# Patient Record
Sex: FEMALE | Race: WHITE | URBAN - METROPOLITAN AREA
[De-identification: names, ages, dates, MRNs, and addresses within clinical notes are randomized per-mention and may not be internally consistent; named-entity substitution may affect disease eponyms.]

---

## 2017-05-17 ENCOUNTER — HOSPITAL ENCOUNTER (INPATIENT)
Facility: CLINIC | Age: 32
LOS: 4 days | Discharge: HOME | DRG: 885 | End: 2017-05-21
Attending: PSYCHIATRY & NEUROLOGY | Admitting: PSYCHIATRY & NEUROLOGY
Payer: COMMERCIAL

## 2017-05-17 ENCOUNTER — TRANSFERRED RECORDS (OUTPATIENT)
Dept: HEALTH INFORMATION MANAGEMENT | Facility: CLINIC | Age: 32
End: 2017-05-17

## 2017-05-17 DIAGNOSIS — K59.01 SLOW TRANSIT CONSTIPATION: ICD-10-CM

## 2017-05-17 DIAGNOSIS — J45.901 ASTHMA EXACERBATION: ICD-10-CM

## 2017-05-17 DIAGNOSIS — F33.2 SEVERE RECURRENT MAJOR DEPRESSION WITHOUT PSYCHOTIC FEATURES (H): ICD-10-CM

## 2017-05-17 DIAGNOSIS — F41.9 ANXIETY: Primary | ICD-10-CM

## 2017-05-17 DIAGNOSIS — K21.9 GASTROESOPHAGEAL REFLUX DISEASE WITHOUT ESOPHAGITIS: ICD-10-CM

## 2017-05-17 PROCEDURE — 12400001 ZZH R&B MH UMMC

## 2017-05-17 PROCEDURE — 25000132 ZZH RX MED GY IP 250 OP 250 PS 637: Performed by: NURSE PRACTITIONER

## 2017-05-17 RX ORDER — FLUTICASONE PROPIONATE 50 MCG
2 SPRAY, SUSPENSION (ML) NASAL DAILY
Status: DISCONTINUED | OUTPATIENT
Start: 2017-05-17 | End: 2017-05-21 | Stop reason: HOSPADM

## 2017-05-17 RX ORDER — OLANZAPINE 10 MG/1
10 TABLET ORAL
Status: DISCONTINUED | OUTPATIENT
Start: 2017-05-17 | End: 2017-05-21 | Stop reason: HOSPADM

## 2017-05-17 RX ORDER — HYDROXYZINE HYDROCHLORIDE 25 MG/1
25-50 TABLET, FILM COATED ORAL EVERY 4 HOURS PRN
Status: DISCONTINUED | OUTPATIENT
Start: 2017-05-17 | End: 2017-05-21 | Stop reason: HOSPADM

## 2017-05-17 RX ORDER — OLANZAPINE 10 MG/2ML
10 INJECTION, POWDER, FOR SOLUTION INTRAMUSCULAR
Status: DISCONTINUED | OUTPATIENT
Start: 2017-05-17 | End: 2017-05-21 | Stop reason: HOSPADM

## 2017-05-17 RX ORDER — MONTELUKAST SODIUM 10 MG/1
10 TABLET ORAL AT BEDTIME
Status: DISCONTINUED | OUTPATIENT
Start: 2017-05-17 | End: 2017-05-21 | Stop reason: HOSPADM

## 2017-05-17 RX ORDER — ALBUTEROL SULFATE 90 UG/1
2 AEROSOL, METERED RESPIRATORY (INHALATION) EVERY 6 HOURS
Status: DISCONTINUED | OUTPATIENT
Start: 2017-05-17 | End: 2017-05-19

## 2017-05-17 RX ORDER — CLONAZEPAM 0.5 MG/1
0.5 TABLET ORAL 3 TIMES DAILY
Status: DISCONTINUED | OUTPATIENT
Start: 2017-05-17 | End: 2017-05-21 | Stop reason: HOSPADM

## 2017-05-17 RX ORDER — BISACODYL 10 MG
10 SUPPOSITORY, RECTAL RECTAL DAILY PRN
Status: DISCONTINUED | OUTPATIENT
Start: 2017-05-17 | End: 2017-05-21 | Stop reason: HOSPADM

## 2017-05-17 RX ORDER — ACETAMINOPHEN 325 MG/1
650 TABLET ORAL EVERY 4 HOURS PRN
Status: DISCONTINUED | OUTPATIENT
Start: 2017-05-17 | End: 2017-05-19

## 2017-05-17 RX ORDER — ALBUTEROL SULFATE 90 UG/1
2 AEROSOL, METERED RESPIRATORY (INHALATION) EVERY 6 HOURS
Status: ON HOLD | COMMUNITY
End: 2017-05-21

## 2017-05-17 RX ORDER — MONTELUKAST SODIUM 10 MG/1
10 TABLET ORAL AT BEDTIME
COMMUNITY

## 2017-05-17 RX ORDER — TRAZODONE HYDROCHLORIDE 50 MG/1
50 TABLET, FILM COATED ORAL
Status: DISCONTINUED | OUTPATIENT
Start: 2017-05-17 | End: 2017-05-21 | Stop reason: HOSPADM

## 2017-05-17 RX ORDER — ARIPIPRAZOLE 5 MG/1
5 TABLET ORAL DAILY
Status: DISCONTINUED | OUTPATIENT
Start: 2017-05-17 | End: 2017-05-18

## 2017-05-17 RX ORDER — NICOTINE 21 MG/24HR
1 PATCH, TRANSDERMAL 24 HOURS TRANSDERMAL DAILY
Status: DISCONTINUED | OUTPATIENT
Start: 2017-05-17 | End: 2017-05-21 | Stop reason: HOSPADM

## 2017-05-17 RX ORDER — ALUMINA, MAGNESIA, AND SIMETHICONE 2400; 2400; 240 MG/30ML; MG/30ML; MG/30ML
30 SUSPENSION ORAL EVERY 4 HOURS PRN
Status: DISCONTINUED | OUTPATIENT
Start: 2017-05-17 | End: 2017-05-21 | Stop reason: HOSPADM

## 2017-05-17 RX ADMIN — NICOTINE 1 PATCH: 14 PATCH, EXTENDED RELEASE TRANSDERMAL at 18:32

## 2017-05-17 RX ADMIN — TRAZODONE HYDROCHLORIDE 50 MG: 50 TABLET ORAL at 20:38

## 2017-05-17 RX ADMIN — MONTELUKAST SODIUM 10 MG: 10 TABLET, FILM COATED ORAL at 20:37

## 2017-05-17 RX ADMIN — CLONAZEPAM 0.5 MG: 0.5 TABLET ORAL at 20:36

## 2017-05-17 ASSESSMENT — ACTIVITIES OF DAILY LIVING (ADL)
COGNITION: 0 - NO COGNITION ISSUES REPORTED
DRESS: 0-->INDEPENDENT
FALL_HISTORY_WITHIN_LAST_SIX_MONTHS: NO
TOILETING: 0-->INDEPENDENT
RETIRED_EATING: 0-->INDEPENDENT
PRIOR_FUNCTIONAL_LEVEL_COMMENT: INDEPENDENT
TRANSFERRING: 0-->INDEPENDENT
BATHING: 0-->INDEPENDENT
AMBULATION: 0-->INDEPENDENT
SWALLOWING: 0-->SWALLOWS FOODS/LIQUIDS WITHOUT DIFFICULTY
RETIRED_COMMUNICATION: 0-->UNDERSTANDS/COMMUNICATES WITHOUT DIFFICULTY

## 2017-05-17 NOTE — IP AVS SNAPSHOT
MRN:[id number]                      After Visit Summary   5/17/2017    Nuha Lees    MRN: [id number]           Thank you!     Thank you for choosing [facility] for your care. Our goal is always to provide you with excellent care. Hearing back from our patients is one way we can continue to improve our services. Please take a few minutes to complete the written survey that you may receive in the mail after you visit with us. Thank you!        Patient Information     Date Of Birth          1985        Designated Caregiver       Most Recent Value    Caregiver    Will someone help with your care after discharge? yes    Name of designated caregiver Sohan     Phone number of caregiver [phone]    Caregiver address same as pt.       About your hospital stay     You were admitted on:  May 17, 2017 You last received care in the:  [facility]    You were discharged on:  May 21, 2017       Who to Call     For medical emergencies, please call 911.  For non-urgent questions about your medical care, please call your primary care provider or clinic, [phone]  For questions related to your surgery, please call your surgery clinic        Attending Provider     Provider Jayy Motta MD Psychiatry    [place] Cuca Segura MD Anesthesiology    Camilo Ross MD Internal Medicine       Primary Care Provider Office Phone # Fax #    Anabel Lord MD, [phone]       [address] [address]        After Care Instructions     Discharge Instructions       No smoking.  Prednisone daily for 3 days starting 5/22  Nebs every 4 hrs as needed  Suppository if no BM in next 2 days  Appointment this week with primary MD - with psychiatry follow up.  Call MD or go to ER if breathing worse, recurrent chest pain or increase despondency.                  Pending Results     Date and Time Order Name Status Description  "   2017 0557 EKG 12-lead, complete Preliminary     2017 1138 EKG 12-lead, complete Preliminary             Statement of Approval     Ordered          17 0926  I have reviewed and agree with all the recommendations and orders detailed in this document.     Approved and electronically signed by:  Camilo Ross MD             Admission Information     Date & Time Provider Department Dept. Phone    2017 Camilo Ross MD [facility] [phone]      Your Vitals Were     Blood Pressure Pulse Temperature Respirations Height Weight    102/59 (BP Location: Left arm) 52 97.4  F (36.3  C) (Oral) 16 1.65 m (5' 4.96\") 82.6 kg (182 lb)    Pulse Oximetry BMI (Body Mass Index)                97% 30.32 kg/m2          MyChart Information     [facility] lets you send messages to your doctor, view your test results, renew your prescriptions, schedule appointments and more. To sign up, go to [email or web address] . Click on \"Log in\" on the left side of the screen, which will take you to the Welcome page. Then click on \"Sign up Now\" on the right side of the page.     You will be asked to enter the access code listed below, as well as some personal information. Please follow the directions to create your username and password.     Your access code is: [email or web address]  Expires: 2017 11:05 AM     Your access code will  in 90 days. If you need help or a new code, please call your [facility] clinic or [phone].        Care EveryWhere ID     This is your Care EveryWhere ID. This could be used by other organizations to access your [facility] medical records  [id number]           Review of your medicines      START taking        Dose / Directions    fluticasone-vilanterol 200-25 MCG/INH oral inhaler   Commonly known as:  BREO ELLIPTA   Used for:  Asthma exacerbation        Dose:  1 puff   Inhale 1 puff into the lungs daily   Quantity:  1 Inhaler   Refills:  0       ipratropium - albuterol 0.5 " mg/2.5 mg/3 mL 0.5-2.5 (3) MG/3ML neb solution   Commonly known as:  DUONEB        Dose:  3 mL   Take 1 vial (3 mLs) by nebulization every 4 hours as needed for shortness of breath / dyspnea or wheezing   Quantity:  360 mL   Refills:  0       nicotine 14 MG/24HR 24 hr patch   Commonly known as:  NICODERM CQ   Used for:  Asthma exacerbation        Dose:  1 patch   Place 1 patch onto the skin daily   Quantity:  14 patch   Refills:  0       pantoprazole 40 MG EC tablet   Commonly known as:  PROTONIX   Used for:  Gastroesophageal reflux disease without esophagitis        Dose:  40 mg   Take 1 tablet (40 mg) by mouth every morning (before breakfast)   Quantity:  15 tablet   Refills:  0       polyethylene glycol Packet   Commonly known as:  MIRALAX/GLYCOLAX   Used for:  Slow transit constipation        Dose:  17 g   Take 17 g by mouth daily as needed for constipation   Quantity:  7 packet   Refills:  0       predniSONE 20 MG tablet   Commonly known as:  DELTASONE   Used for:  Asthma exacerbation        Dose:  40 mg   Start taking on:  5/22/2017   Take 2 tablets (40 mg) by mouth daily for 3 days   Quantity:  6 tablet   Refills:  0       senna-docusate 8.6-50 MG per tablet   Commonly known as:  SENOKOT-S;PERICOLACE   Used for:  Slow transit constipation        Dose:  2 tablet   Take 2 tablets by mouth 2 times daily as needed (constipation )   Quantity:  20 tablet   Refills:  0       traZODone 50 MG tablet   Commonly known as:  DESYREL        Dose:  50 mg   Take 1 tablet (50 mg) by mouth nightly as needed for sleep   Quantity:  15 tablet   Refills:  0       venlafaxine 150 MG Tb24 24 hr tablet   Commonly known as:  EFFEXOR-ER        Dose:  150 mg   Take 1 tablet (150 mg) by mouth daily (with breakfast)   Quantity:  30 each   Refills:  0         CONTINUE these medicines which may have CHANGED, or have new prescriptions. If we are uncertain of the size of tablets/capsules you have at home, strength may be listed as something  that might have changed.        Dose / Directions    albuterol 108 (90 BASE) MCG/ACT Inhaler   Commonly known as:  PROAIR HFA/PROVENTIL HFA/VENTOLIN HFA   This may have changed:    - when to take this  - reasons to take this        Dose:  2 puff   Inhale 2 puffs into the lungs every 4 hours as needed for shortness of breath / dyspnea or wheezing   Refills:  0       hydrOXYzine 25 MG tablet   Commonly known as:  ATARAX   This may have changed:    - medication strength  - how much to take  - reasons to take this   Used for:  Anxiety        Dose:  25-50 mg   Take 1-2 tablets (25-50 mg) by mouth every 4 hours as needed for anxiety   Quantity:  30 tablet   Refills:  0         CONTINUE these medicines which have NOT CHANGED        Dose / Directions    CLONAZEPAM PO        Dose:  0.5 mg   Take 0.5 mg by mouth 3 times daily   Refills:  0       fluticasone 27.5 MCG/SPRAY spray   Commonly known as:  VERAMYST        Dose:  2 spray   Spray 2 sprays into both nostrils daily   Refills:  0       montelukast 10 MG tablet   Commonly known as:  SINGULAIR        Dose:  10 mg   Take 10 mg by mouth At Bedtime   Refills:  0       VITAMIN D (CHOLECALCIFEROL) PO        Dose:  5000 Units   Take 5,000 Units by mouth daily   Refills:  0         STOP taking     ARIPIPRAZOLE PO           PROZAC PO                Where to get your medicines      These medications were sent to [facility] [place], [place] - [address]  [address] [address]     Phone:  [phone]     fluticasone-vilanterol 200-25 MCG/INH oral inhaler    hydrOXYzine 25 MG tablet    nicotine 14 MG/24HR 24 hr patch    pantoprazole 40 MG EC tablet    polyethylene glycol Packet    predniSONE 20 MG tablet    senna-docusate 8.6-50 MG per tablet    traZODone 50 MG tablet    venlafaxine 150 MG Tb24 24 hr tablet                Protect others around you: Learn how to safely use, store and throw away your medicines at www.disposemymeds.org.              Medication List: This is a list of all your medications and when to take them. Check marks below indicate your daily home schedule. Keep this list as a reference.      Medications           Morning Afternoon Evening Bedtime As Needed    albuterol 108 (90 BASE) MCG/ACT Inhaler   Commonly known as:  PROAIR HFA/PROVENTIL HFA/VENTOLIN HFA   Inhale 2 puffs into the lungs every 4 hours as needed for shortness of breath / dyspnea or wheezing   Last time this was given:  6 puffs on 5/19/2017 10:16 AM                                   CLONAZEPAM PO   Take 0.5 mg by mouth 3 times daily   Last time this was given:  0.5 mg on 5/21/2017  8:37 AM            8AM       2PM       8PM               fluticasone 27.5 MCG/SPRAY spray   Commonly known as:  VERAMYST   Spray 2 sprays into both nostrils daily            8AM                       fluticasone-vilanterol 200-25 MCG/INH oral inhaler   Commonly known as:  BREO ELLIPTA   Inhale 1 puff into the lungs daily   Last time this was given:  1 puff on 5/21/2017  8:36 AM            8AM                       hydrOXYzine 25 MG tablet   Commonly known as:  ATARAX   Take 1-2 tablets (25-50 mg) by mouth every 4 hours as needed for anxiety   Last time this was given:  50 mg on 5/21/2017 11:12 AM                                   ipratropium - albuterol 0.5 mg/2.5 mg/3 mL 0.5-2.5 (3) MG/3ML neb solution   Commonly known as:  DUONEB   Take 1 vial (3 mLs) by nebulization every 4 hours as needed for shortness of breath / dyspnea or wheezing   Last time this was given:  3 mLs on 5/21/2017  8:14 AM                                   montelukast 10 MG tablet   Commonly known as:  SINGULAIR   Take 10 mg by mouth At Bedtime   Last time this was given:  10 mg on 5/20/2017  8:23 PM                                   nicotine 14 MG/24HR 24 hr patch   Commonly known as:  NICODERM CQ   Place 1 patch onto the skin daily   Last time this was given:  1 patch on 5/20/2017  3:07 PM                                 pantoprazole 40 MG EC tablet   Commonly known as:  PROTONIX   Take 1 tablet (40 mg) by mouth every morning (before breakfast)            8AM                       polyethylene glycol Packet   Commonly known as:  MIRALAX/GLYCOLAX   Take 17 g by mouth daily as needed for constipation                                   predniSONE 20 MG tablet   Commonly known as:  DELTASONE   Take 2 tablets (40 mg) by mouth daily for 3 days   Start taking on:  5/22/2017   Last time this was given:  40 mg on 5/21/2017  8:37 AM            8AM                       senna-docusate 8.6-50 MG per tablet   Commonly known as:  SENOKOT-S;PERICOLACE   Take 2 tablets by mouth 2 times daily as needed (constipation )   Last time this was given:  2 tablets on 5/19/2017  8:27 PM            8AM           8PM               traZODone 50 MG tablet   Commonly known as:  DESYREL   Take 1 tablet (50 mg) by mouth nightly as needed for sleep   Last time this was given:  50 mg on 5/20/2017  8:23 PM                                   venlafaxine 150 MG Tb24 24 hr tablet   Commonly known as:  EFFEXOR-ER   Take 1 tablet (150 mg) by mouth daily (with breakfast)   Last time this was given:  150 mg on 5/21/2017  8:37 AM            8AM                       VITAMIN D (CHOLECALCIFEROL) PO   Take 5,000 Units by mouth daily

## 2017-05-17 NOTE — PLAN OF CARE
Problem: Depressive Symptoms  Goal: Depressive Symptoms  Signs and symptoms of listed problems will be absent or manageable.  30 yo female admitted to sta. [facility] on a voluntary basis for depression and suicidal ideation. She also has SIB behavioral. She her wrist and upper arm on her left arm this morning. The cuts are superficial.  She states she is agoraphobic. She has three children, 9, 11, and 19 years old. She states she does not want to be here but she is here on a voluntary basis. She states she can contract for safety and she does know what that means, we discussed what it means to contract for safety.  She states she has asthma and did not get an allergy vaccine or a pneumonia vaccine. She refused both.

## 2017-05-17 NOTE — PROGRESS NOTES
05/17/17 1704   Patient Belongings   Did you bring any home meds/supplements to the hospital?  Yes   Disposition of meds  Sent to security/pharmacy per site process   Patient Belongings suitcase;shoes;ring;clothing;glasses;tote   Disposition of Belongings Locker 16, cupboard storage   Belongings Search Yes   Clothing Search Yes   Second Staff Tawny - Psych associate     Locker 16: Shirt (3), yoga pants (2), tank top (3), jeans, pajama pants, toiletries in small tote bag, notebook, picture board, health insurance card, socks, underwear, pads    Security: Medications    Lower Storage: Suitcase, pillow, scarves (3), pants with strings (2)    Patient request to keep listed items: silver color rings (2), glasses, bra, zipper sweatshirt, shirt, slippers    ADMISSION:  I am responsible for any personal items that are not sent to the safe or pharmacy. [facility] is not responsible for loss, theft or damage of any property in my possession.    Patient Signature _____________________ Date/Time _____________________    Staff Signature _______________________ Date/Time _____________________    2nd Staff person, if patient is unable/unwilling to sign  ___________________________________ Date/Time _____________________    DISCHARGE:  My personal items have been returned to me.   Patient Signature _____________________ Date/Time _____________________

## 2017-05-17 NOTE — IP AVS SNAPSHOT
[facility]    [address] [place] [address]    Phone:  [phone]                                       After Visit Summary   5/17/2017    Nuha Lees    MRN: [id number]           After Visit Summary Signature Page     I have received my discharge instructions, and my questions have been answered. I have discussed any challenges I see with this plan with the nurse or doctor.    ..........................................................................................................................................  Patient/Patient Representative Signature      ..........................................................................................................................................  Patient Representative Print Name and Relationship to Patient    ..................................................               ................................................  Date                                            Time    ..........................................................................................................................................  Reviewed by Signature/Title    ...................................................              ..............................................  Date                                                            Time

## 2017-05-18 PROBLEM — F33.2 SEVERE RECURRENT MAJOR DEPRESSION WITHOUT PSYCHOTIC FEATURES (H): Status: ACTIVE | Noted: 2017-05-18

## 2017-05-18 LAB
ALBUMIN SERPL-MCNC: 3.6 G/DL (ref 3.4–5)
ALP SERPL-CCNC: 51 U/L (ref 40–150)
ALT SERPL W P-5'-P-CCNC: 22 U/L (ref 0–50)
ANION GAP SERPL CALCULATED.3IONS-SCNC: 6 MMOL/L (ref 3–14)
AST SERPL W P-5'-P-CCNC: 15 U/L (ref 0–45)
BILIRUB SERPL-MCNC: 0.5 MG/DL (ref 0.2–1.3)
BUN SERPL-MCNC: 16 MG/DL (ref 7–30)
CALCIUM SERPL-MCNC: 9.2 MG/DL (ref 8.5–10.1)
CHLORIDE SERPL-SCNC: 106 MMOL/L (ref 94–109)
CHOLEST SERPL-MCNC: 190 MG/DL
CO2 SERPL-SCNC: 30 MMOL/L (ref 20–32)
CREAT SERPL-MCNC: 1.01 MG/DL (ref 0.52–1.04)
ERYTHROCYTE [DISTWIDTH] IN BLOOD BY AUTOMATED COUNT: 12.4 % (ref 10–15)
GFR SERPL CREATININE-BSD FRML MDRD: 63 ML/MIN/1.7M2
GLUCOSE SERPL-MCNC: 83 MG/DL (ref 70–99)
HCG UR QL: NEGATIVE
HCT VFR BLD AUTO: 39.3 % (ref 35–47)
HDLC SERPL-MCNC: 52 MG/DL
HGB BLD-MCNC: 13 G/DL (ref 11.7–15.7)
LDLC SERPL CALC-MCNC: 106 MG/DL
MCH RBC QN AUTO: 29.9 PG (ref 26.5–33)
MCHC RBC AUTO-ENTMCNC: 33.1 G/DL (ref 31.5–36.5)
MCV RBC AUTO: 90 FL (ref 78–100)
NONHDLC SERPL-MCNC: 138 MG/DL
PLATELET # BLD AUTO: 225 10E9/L (ref 150–450)
POTASSIUM SERPL-SCNC: 4 MMOL/L (ref 3.4–5.3)
PROT SERPL-MCNC: 6.9 G/DL (ref 6.8–8.8)
RBC # BLD AUTO: 4.35 10E12/L (ref 3.8–5.2)
SODIUM SERPL-SCNC: 142 MMOL/L (ref 133–144)
TRIGL SERPL-MCNC: 159 MG/DL
TSH SERPL DL<=0.005 MIU/L-ACNC: 1.83 MU/L (ref 0.4–4)
WBC # BLD AUTO: 9.2 10E9/L (ref 4–11)

## 2017-05-18 PROCEDURE — 99232 SBSQ HOSP IP/OBS MODERATE 35: CPT | Performed by: PHYSICIAN ASSISTANT

## 2017-05-18 PROCEDURE — 80061 LIPID PANEL: CPT | Performed by: NURSE PRACTITIONER

## 2017-05-18 PROCEDURE — 93005 ELECTROCARDIOGRAM TRACING: CPT

## 2017-05-18 PROCEDURE — 90853 GROUP PSYCHOTHERAPY: CPT

## 2017-05-18 PROCEDURE — 12400001 ZZH R&B MH UMMC

## 2017-05-18 PROCEDURE — 99223 1ST HOSP IP/OBS HIGH 75: CPT | Mod: AI | Performed by: PSYCHIATRY & NEUROLOGY

## 2017-05-18 PROCEDURE — 85027 COMPLETE CBC AUTOMATED: CPT | Performed by: PHYSICIAN ASSISTANT

## 2017-05-18 PROCEDURE — 84443 ASSAY THYROID STIM HORMONE: CPT | Performed by: NURSE PRACTITIONER

## 2017-05-18 PROCEDURE — 25000132 ZZH RX MED GY IP 250 OP 250 PS 637: Performed by: NURSE PRACTITIONER

## 2017-05-18 PROCEDURE — 36415 COLL VENOUS BLD VENIPUNCTURE: CPT | Performed by: PHYSICIAN ASSISTANT

## 2017-05-18 PROCEDURE — 36415 COLL VENOUS BLD VENIPUNCTURE: CPT | Performed by: NURSE PRACTITIONER

## 2017-05-18 PROCEDURE — 99207 ZZC CONSULT E&M CHANGED TO SUBSEQUENT LEVEL: CPT | Performed by: PHYSICIAN ASSISTANT

## 2017-05-18 PROCEDURE — 80053 COMPREHEN METABOLIC PANEL: CPT | Performed by: PHYSICIAN ASSISTANT

## 2017-05-18 PROCEDURE — 25000132 ZZH RX MED GY IP 250 OP 250 PS 637: Performed by: PSYCHIATRY & NEUROLOGY

## 2017-05-18 PROCEDURE — 81025 URINE PREGNANCY TEST: CPT | Performed by: PHYSICIAN ASSISTANT

## 2017-05-18 RX ORDER — IPRATROPIUM BROMIDE AND ALBUTEROL SULFATE 2.5; .5 MG/3ML; MG/3ML
3 SOLUTION RESPIRATORY (INHALATION) EVERY 4 HOURS PRN
Status: DISCONTINUED | OUTPATIENT
Start: 2017-05-18 | End: 2017-05-19

## 2017-05-18 RX ORDER — VENLAFAXINE HYDROCHLORIDE 75 MG/1
75 TABLET, EXTENDED RELEASE ORAL
Status: DISCONTINUED | OUTPATIENT
Start: 2017-05-20 | End: 2017-05-21

## 2017-05-18 RX ORDER — VENLAFAXINE HYDROCHLORIDE 37.5 MG/1
37.5 TABLET, EXTENDED RELEASE ORAL
Status: COMPLETED | OUTPATIENT
Start: 2017-05-19 | End: 2017-05-19

## 2017-05-18 RX ADMIN — CLONAZEPAM 0.5 MG: 0.5 TABLET ORAL at 07:51

## 2017-05-18 RX ADMIN — ARIPIPRAZOLE 5 MG: 5 TABLET ORAL at 07:51

## 2017-05-18 RX ADMIN — MONTELUKAST SODIUM 10 MG: 10 TABLET, FILM COATED ORAL at 23:41

## 2017-05-18 RX ADMIN — FLUOXETINE 60 MG: 20 CAPSULE ORAL at 07:51

## 2017-05-18 RX ADMIN — FLUTICASONE PROPIONATE 2 SPRAY: 50 SPRAY, METERED NASAL at 07:57

## 2017-05-18 RX ADMIN — CHOLECALCIFEROL CAP 125 MCG (5000 UNIT) 5000 UNITS: 125 CAP at 07:51

## 2017-05-18 RX ADMIN — HYDROXYZINE HYDROCHLORIDE 50 MG: 25 TABLET ORAL at 09:49

## 2017-05-18 RX ADMIN — TRAZODONE HYDROCHLORIDE 50 MG: 50 TABLET ORAL at 23:40

## 2017-05-18 RX ADMIN — HYDROXYZINE HYDROCHLORIDE 50 MG: 25 TABLET ORAL at 16:17

## 2017-05-18 RX ADMIN — CLONAZEPAM 0.5 MG: 0.5 TABLET ORAL at 14:05

## 2017-05-18 RX ADMIN — NICOTINE 1 PATCH: 14 PATCH, EXTENDED RELEASE TRANSDERMAL at 07:51

## 2017-05-18 ASSESSMENT — ACTIVITIES OF DAILY LIVING (ADL)
GROOMING: INDEPENDENT
ORAL_HYGIENE: INDEPENDENT
DRESS: STREET CLOTHES;INDEPENDENT
ORAL_HYGIENE: INDEPENDENT
LAUNDRY: UNABLE TO COMPLETE
DRESS: STREET CLOTHES;INDEPENDENT
GROOMING: INDEPENDENT
LAUNDRY: WITH SUPERVISION

## 2017-05-18 NOTE — PROGRESS NOTES
"INITIAL OT ATTENDANCE:  Pt participated in OT clinic and was able to initiate task, follow through with plan and ask for help as needed.    She presented with flat affect, sat apart from peers and was not observed socializing with others.   When OTR offered assistance, she accepted offer and said \"thank you\". With verbal cueing she cleaned her work space and placed her project in cupboard.  Writer provided self assessment form for pt to complete and will follow up on Friday.   Additional interaction needed for assessment and plan.          "

## 2017-05-18 NOTE — H&P
"DATE OF ASSESSMENT:  05/18/2017      IDENTIFYING INFORMATION:  Nuha Lees is a 31-year-old   female, mother of two, who resides with her  and their children.      CHIEF COMPLAINT:  \"I've been real depressed.  I was asked to come to the hospital to get ECT.\"      HISTORY OF PRESENT ILLNESS:  The patient has a history of major depressive disorder and anxiety.  She presented to an outside emergency room at the advice of her therapist and recent provider.  She had completed an intake through partial hospitalization program, both of whom recommended that she pursue ECT for management of severe depressive symptoms and suicidal thoughts.  She was transferred to our behavioral health unit for this purpose.  She recalls that over the past 2 years depressive symptoms have progressively worsened with significant worsening over the past 6 weeks.  She denies any direct correlation to psychosocial stressors; however, as her depressive symptoms have worsened, she has not been able to work, prompting some existential stressors.  She adds that she has felt quite unmotivated, has been experiencing lack of energy, having difficulty attending to usual activities and responsibilities around the home.  She has been contemplating suicide very frequently; however, has been conflicted by not wanting to have her children to find her body afterwards.  She recently gained a referral to a partial hospitalization program in hopes of gaining improvements from the outpatient setting.  She completed the intake appointment and was set to start attending next week; however, her symptoms escalated to involve intense suicidal urges contemplating cutting herself, which eventually led to her current hospitalization.  She has been maintaining compliance with her medications and seeing her therapist regularly however, failing these treatment approaches.      PSYCHIATRIC REVIEW OF SYSTEMS:  Regarding a depressive episode, mood " has been depressed, greater than a 2-week period, accompanied with low energy, low appetite, low concentration, anhedonia.  She endorsed a 45-pound weight gain over the past couple of months, which she attributes to the addition of Abilify.  Suicidal thoughts are present.  She does feel safe on the unit.  She denied homicidal thoughts.  No symptoms of que.  She endorsed symptoms of anxiety, describing herself as a frequent and excessive worrier, occasionally to the point of panic attacks which may occur once a week.  No symptoms corresponding to psychosis, PTSD, eating disorder or OCD.      PAST PSYCHIATRIC HISTORY:  She has been receiving mental health treatment for management of depressive symptoms and anxiety since her early 20s.  She has attempted suicide 1 time a few years ago where she had cut her ankles hoping that she would bleed to death.  This is her fourth inpatient hospitalization with her last being 2 months ago.  Her outpatient providers include a therapist and a primary care physician who manages her medications.  She was recently referred to a partial hospitalization program, however, has not started yet.  Past medication trials have included Paxil, Prozac, Cymbalta, Wellbutrin, augmentation with Abilify, which introduced weight gain and a trial of Trintellix, which caused swelling and itching.      SUBSTANCE ABUSE HISTORY:  She smokes cannabis a few times throughout the week, which she finds helpful at lowering the intensity of her depressive symptoms and managing her anxiety.  She denied any adverse effects.  No significant alcohol usage reported.  She denied other illicit substance use.  No history of DUIs, DWIs, admissions to detox or treatment.  No legal implications of use.      MEDICAL HISTORY:  Asthma.      FAMILY HISTORY:  Numerous family members with depression and a couple with bipolar disorder.  She recalls that her cousin had completed suicide.  Her mother was severely depressed and  was treated with ECT when the patient was fairly young.      SOCIAL HISTORY:  Refer to the psychosocial assessment completed by our .  The patient was working as a , however, has been out of work for some time due to her depressive symptoms.  She resides with her  and their 2 children.  No legal issues.      MEDICAL REVIEW OF SYSTEMS:  Ten-point systems were reviewed and were positive for psychiatric symptoms as noted above, otherwise negative.      PHYSICAL EXAMINATION:   VITAL SIGNS:  Blood pressure 102/66, respirations 16, heart rate 65, temperature 97.7, weight 187 pounds.   The rest of the physical examination was reviewed in the emergency room documentation.      MENTAL STATUS EXAMINATION:  The patient appears her stated age, appropriately dressed in hospital scrubs.  Fair hygiene.  She was out in the common area.  She sat in the chair and displayed mild psychomotor retardation.  Eye contact was fair.  Speech was soft in volume, mildly increased latency, adequate content.  Mood is described as depressed, and affect was blunted and near tearful at times.  Thought process is linear, logical.  Associations were intact.  Thought content was positive for suicidal thoughts, denies homicidal thoughts.  No evidence of psychosis.  Insight and judgment appear fair.  Cognition appears intact to interviewing including orientation to person, place, time and situation, use of language, fund of knowledge, recent and remote memory.  Muscle strength, tone and gait appear normal on visual inspection.      ASSESSMENT:   1.  Major depressive disorder, recurrent, severe.   2.  Generalized anxiety disorder.   3.  Asthma.      PLAN:   1.  The patient has been admitted to our Behavioral Health Unit on station 30 under voluntary status for treatment of depressed mood and suicidal thoughts.  Treatment will be continued voluntarily.   2.  She reports no significant response to Prozac augmented with Abilify.   She has tried higher doses of Abilify, which only introduced side effects without benefit.  I will begin to taper her off of these 2 medications due to lack of benefit.  Beginning tomorrow we will introduce Effexor to be titrated up as tolerated to address mood and anxiety symptoms.  Risks, benefits and side effects of this plan were reviewed with her including the risks and benefits of Effexor.  She consented to treatment as outlined in this document.   3.  The patient is interested in pursuing ECT to address the severity of her depressive symptoms and suicidal thoughts.  An Internal Medicine consultation has been requested for medical clearance.  Consent has been signed and placed in the chart.  I reviewed with her alternatives to ECT; however, given the intensity of her symptoms, past medication trials and poor tolerability of symptoms with ongoing suicidal thoughts and suicide risk, this was determined to be a reasonable treatment approach which she prefers to pursue.   4.  Psychosocial treatments to be addressed with social work consult and groups.   5.  Anticipated hospital stay of 1-2 weeks as we target improvement in mood and remission of suicidal thoughts.         HARJEET HUGHES MD             D: 2017 13:28   T: 2017 17:15   MT: SANG      Name:     BRIANNA LAYTON   MRN:      [id number]        Account:      [id number]   :      1985           Admitted:     [id number]      Document: [id number]

## 2017-05-18 NOTE — PROGRESS NOTES
"Pt was in the milieu.  Attended one group.  Pt rates anx 8 of 10, dep 6 of 10.  Pt denies SI, SIB.  Pt's affect is very flat.  She hopes to talk with the . Re: ECT.  \"I don't know what else to try.\"  "

## 2017-05-18 NOTE — CONSULTS
[facility]  Internal Medicine Consult    Nuha Lees MRN# [id number]   Age: 31 year old YOB: 1985     Date of Admission: 5/17/2017  Date of Consult:  5/18/2017    Requesting Service: Behavioral Health - Jayy Duke MD  Reason for Consult: Pre ECT Medicine Evaluation   Indication for ECT: Depression    Chief Complaint: Depression  HPI: Nuha Lees is a 31 year old female with history of depression, asthma, and SI who was admitted to station 30 with increased SI.     Review of Systems:   Cardiovascular: negative, tachycardia, irregular heart beat, chest pain, exertional chest pain or pressure, orthopnea, lower extremity edema, syncope or near-syncope and cyanosis  Pulmonary: Shortness of breath, TREJO, wheezing associated with asthma. Last asthma attack around 5/15.  Neurological: negative for, migraine headaches, headaches, syncope, stroke, seizures, paralysis, local weakness, numbness or tingling of hands, numbness or tingling of feet, involuntary movements, speech problems, incoordination and memory problems    Past Medical History:   Prior Anesthesia: Yes  If yes, any complications: No    Prior ECT: No    Cardiovascular: CAD No, MI No, HTN No, CHF No, pacemaker or ICD No  Pulmonary: Asthma/COPD Yes, Prior respiratory failure or need for emergent intubation No, On theophylline No (note that theophylline use is a contraindication to proceeding with ECT, needs to be tapered off prior)  Neurological: Brain tumor No, TIA/CVA No, Dementia No, Neuromuscular Disease (including post polio syndrome) No, Seizures and/or Epilepsy No, Head Injury No, Intracranial Hemorrhage No    Past Surgical History:   Past Surgical History:   Procedure Laterality Date     KNEE SURGERY Right     Roller derby injury      REPAIR PTOSIS  10/5/2011    Procedure:REPAIR PTOSIS; Left Upper Lid Ptosis Repair; Surgeon:MONICA ANDREWS; Location: [facility]     TUBAL LIGATION  2016       Allergies:     "  Allergies   Allergen Reactions     Codeine Sulfate      Sulfa Drugs        Medication list reviewed.    Physical Exam:  /66  Pulse 65  Temp 97.7  F (36.5  C)  Resp 16  Ht 1.626 m (5' 4\")  Wt 84.8 kg (187 lb)  BMI 32.1 kg/m2   Cardiovascular: RRR. S1, S2. No murmurs, rubs, gallops.   Pulmonary: Effort normal. Lungs CTAB with no wheezing, rales, rhonchi.   Neurological: A&Ox3. No focal deficits. PERRLA.     Data:  EKG:Sinus bradycardia, rate 59. QTc 417  Head Imaging: Deferred  Labs:   Lab Results   Component Value Date    WBC 9.2 05/18/2017     Lab Results   Component Value Date    RBC 4.35 05/18/2017     Lab Results   Component Value Date    HGB 13.0 05/18/2017     Lab Results   Component Value Date    HCT 39.3 05/18/2017     CMP WNL.    HCG: Negative      Assessment, plan and recommendations:     Asthma and allergies: Reports recent asthma attack that resolved with nebulizer treatment. PTA on Singular, veraamyst and albuterol, continue.  - Add prn duonebs    Diuretics and oral hypoglycemics should be held the morning of ECT.   All other antihypertensive medications can be continued.     Patient does not have absolute medical contraindications to proceeding with ECT at this time. Treatment plan per psychiatry.       Clarice Gaffney  [facility]  Hospitalist Service  Pager: [phone]        "

## 2017-05-19 ENCOUNTER — ANESTHESIA (OUTPATIENT)
Dept: BEHAVIORAL HEALTH | Facility: CLINIC | Age: 32
End: 2017-05-19

## 2017-05-19 ENCOUNTER — APPOINTMENT (OUTPATIENT)
Dept: GENERAL RADIOLOGY | Facility: CLINIC | Age: 32
DRG: 885 | End: 2017-05-19
Attending: ANESTHESIOLOGY
Payer: COMMERCIAL

## 2017-05-19 ENCOUNTER — ANESTHESIA EVENT (OUTPATIENT)
Dept: BEHAVIORAL HEALTH | Facility: CLINIC | Age: 32
End: 2017-05-19

## 2017-05-19 PROBLEM — J69.0 ASPIRATION PNEUMONITIS (H): Status: ACTIVE | Noted: 2017-05-19

## 2017-05-19 LAB
BASE DEFICIT BLDA-SCNC: 0.4 MMOL/L
HCO3 BLD-SCNC: 25 MMOL/L (ref 21–28)
INTERPRETATION ECG - MUSE: NORMAL
LACTATE BLD-SCNC: 3.3 MMOL/L (ref 0.7–2.1)
LACTATE BLD-SCNC: 4 MMOL/L (ref 0.7–2.1)
O2/TOTAL GAS SETTING VFR VENT: 21 %
PCO2 BLD: 44 MM HG (ref 35–45)
PH BLD: 7.36 PH (ref 7.35–7.45)
PO2 BLD: 77 MM HG (ref 80–105)

## 2017-05-19 PROCEDURE — 25000128 H RX IP 250 OP 636: Performed by: INTERNAL MEDICINE

## 2017-05-19 PROCEDURE — 25000125 ZZHC RX 250: Performed by: ANESTHESIOLOGY

## 2017-05-19 PROCEDURE — 25000132 ZZH RX MED GY IP 250 OP 250 PS 637: Performed by: NURSE PRACTITIONER

## 2017-05-19 PROCEDURE — 25000132 ZZH RX MED GY IP 250 OP 250 PS 637: Performed by: PSYCHIATRY & NEUROLOGY

## 2017-05-19 PROCEDURE — 25000128 H RX IP 250 OP 636: Performed by: PHYSICIAN ASSISTANT

## 2017-05-19 PROCEDURE — 93005 ELECTROCARDIOGRAM TRACING: CPT

## 2017-05-19 PROCEDURE — 40000671 ZZH STATISTIC ANESTHESIA CASE

## 2017-05-19 PROCEDURE — 36415 COLL VENOUS BLD VENIPUNCTURE: CPT | Performed by: INTERNAL MEDICINE

## 2017-05-19 PROCEDURE — 25000132 ZZH RX MED GY IP 250 OP 250 PS 637: Performed by: NURSE ANESTHETIST, CERTIFIED REGISTERED

## 2017-05-19 PROCEDURE — 40000917 ZZH STATISTIC PEAK FLOW MEASUREMENT

## 2017-05-19 PROCEDURE — 93010 ELECTROCARDIOGRAM REPORT: CPT | Performed by: INTERNAL MEDICINE

## 2017-05-19 PROCEDURE — 25000128 H RX IP 250 OP 636: Performed by: ANESTHESIOLOGY

## 2017-05-19 PROCEDURE — 71000014 ZZH RECOVERY PHASE 1 LEVEL 2 FIRST HR

## 2017-05-19 PROCEDURE — 94640 AIRWAY INHALATION TREATMENT: CPT | Mod: 76

## 2017-05-19 PROCEDURE — 25000128 H RX IP 250 OP 636: Performed by: PSYCHIATRY & NEUROLOGY

## 2017-05-19 PROCEDURE — 40000275 ZZH STATISTIC RCP TIME EA 10 MIN

## 2017-05-19 PROCEDURE — 82803 BLOOD GASES ANY COMBINATION: CPT | Performed by: ANESTHESIOLOGY

## 2017-05-19 PROCEDURE — 12000001 ZZH R&B MED SURG/OB UMMC

## 2017-05-19 PROCEDURE — 83605 ASSAY OF LACTIC ACID: CPT | Performed by: INTERNAL MEDICINE

## 2017-05-19 PROCEDURE — 71010 XR CHEST PORT 1 VW: CPT

## 2017-05-19 PROCEDURE — 25000125 ZZHC RX 250: Performed by: PHYSICIAN ASSISTANT

## 2017-05-19 PROCEDURE — 25000128 H RX IP 250 OP 636: Performed by: NURSE ANESTHETIST, CERTIFIED REGISTERED

## 2017-05-19 PROCEDURE — 36600 WITHDRAWAL OF ARTERIAL BLOOD: CPT

## 2017-05-19 PROCEDURE — 25000132 ZZH RX MED GY IP 250 OP 250 PS 637: Performed by: PHYSICIAN ASSISTANT

## 2017-05-19 PROCEDURE — 94640 AIRWAY INHALATION TREATMENT: CPT

## 2017-05-19 RX ORDER — ONDANSETRON 2 MG/ML
4 INJECTION INTRAMUSCULAR; INTRAVENOUS EVERY 6 HOURS PRN
Status: DISCONTINUED | OUTPATIENT
Start: 2017-05-19 | End: 2017-05-21 | Stop reason: HOSPADM

## 2017-05-19 RX ORDER — ACETAMINOPHEN 325 MG/1
650 TABLET ORAL EVERY 4 HOURS PRN
Status: DISCONTINUED | OUTPATIENT
Start: 2017-05-19 | End: 2017-05-21 | Stop reason: HOSPADM

## 2017-05-19 RX ORDER — ALBUTEROL SULFATE 0.83 MG/ML
2.5 SOLUTION RESPIRATORY (INHALATION)
Status: DISCONTINUED | OUTPATIENT
Start: 2017-05-19 | End: 2017-05-21 | Stop reason: HOSPADM

## 2017-05-19 RX ORDER — ONDANSETRON 2 MG/ML
4 INJECTION INTRAMUSCULAR; INTRAVENOUS
Status: CANCELLED
Start: 2017-05-19 | End: 2017-05-19

## 2017-05-19 RX ORDER — ONDANSETRON 4 MG/1
4 TABLET, ORALLY DISINTEGRATING ORAL EVERY 6 HOURS PRN
Status: DISCONTINUED | OUTPATIENT
Start: 2017-05-19 | End: 2017-05-21 | Stop reason: HOSPADM

## 2017-05-19 RX ORDER — METHYLPREDNISOLONE SODIUM SUCCINATE 125 MG/2ML
60 INJECTION, POWDER, LYOPHILIZED, FOR SOLUTION INTRAMUSCULAR; INTRAVENOUS EVERY 8 HOURS
Status: COMPLETED | OUTPATIENT
Start: 2017-05-19 | End: 2017-05-20

## 2017-05-19 RX ORDER — LIDOCAINE 40 MG/G
CREAM TOPICAL
Status: DISCONTINUED | OUTPATIENT
Start: 2017-05-19 | End: 2017-05-21 | Stop reason: HOSPADM

## 2017-05-19 RX ORDER — POLYETHYLENE GLYCOL 3350 17 G/17G
17 POWDER, FOR SOLUTION ORAL DAILY PRN
Status: DISCONTINUED | OUTPATIENT
Start: 2017-05-19 | End: 2017-05-21 | Stop reason: HOSPADM

## 2017-05-19 RX ORDER — AMOXICILLIN 250 MG
1-2 CAPSULE ORAL 2 TIMES DAILY PRN
Status: DISCONTINUED | OUTPATIENT
Start: 2017-05-19 | End: 2017-05-20

## 2017-05-19 RX ORDER — ALBUTEROL SULFATE 0.83 MG/ML
2.5 SOLUTION RESPIRATORY (INHALATION) EVERY 4 HOURS PRN
Status: DISCONTINUED | OUTPATIENT
Start: 2017-05-19 | End: 2017-05-19 | Stop reason: HOSPADM

## 2017-05-19 RX ORDER — ONDANSETRON 2 MG/ML
4 INJECTION INTRAMUSCULAR; INTRAVENOUS EVERY 30 MIN PRN
Status: DISCONTINUED | OUTPATIENT
Start: 2017-05-19 | End: 2017-05-19 | Stop reason: HOSPADM

## 2017-05-19 RX ORDER — SODIUM CHLORIDE 9 MG/ML
INJECTION, SOLUTION INTRAVENOUS CONTINUOUS
Status: DISCONTINUED | OUTPATIENT
Start: 2017-05-19 | End: 2017-05-20

## 2017-05-19 RX ORDER — ONDANSETRON 2 MG/ML
4 INJECTION INTRAMUSCULAR; INTRAVENOUS
Status: COMPLETED | OUTPATIENT
Start: 2017-05-19 | End: 2017-05-19

## 2017-05-19 RX ORDER — SODIUM CHLORIDE, SODIUM LACTATE, POTASSIUM CHLORIDE, CALCIUM CHLORIDE 600; 310; 30; 20 MG/100ML; MG/100ML; MG/100ML; MG/100ML
INJECTION, SOLUTION INTRAVENOUS CONTINUOUS
Status: DISCONTINUED | OUTPATIENT
Start: 2017-05-19 | End: 2017-05-19 | Stop reason: HOSPADM

## 2017-05-19 RX ORDER — ONDANSETRON 4 MG/1
4 TABLET, ORALLY DISINTEGRATING ORAL EVERY 30 MIN PRN
Status: DISCONTINUED | OUTPATIENT
Start: 2017-05-19 | End: 2017-05-19 | Stop reason: HOSPADM

## 2017-05-19 RX ORDER — NALOXONE HYDROCHLORIDE 0.4 MG/ML
.1-.4 INJECTION, SOLUTION INTRAMUSCULAR; INTRAVENOUS; SUBCUTANEOUS
Status: DISCONTINUED | OUTPATIENT
Start: 2017-05-19 | End: 2017-05-21 | Stop reason: HOSPADM

## 2017-05-19 RX ORDER — IPRATROPIUM BROMIDE AND ALBUTEROL SULFATE 2.5; .5 MG/3ML; MG/3ML
3 SOLUTION RESPIRATORY (INHALATION)
Status: DISCONTINUED | OUTPATIENT
Start: 2017-05-19 | End: 2017-05-20

## 2017-05-19 RX ADMIN — METHYLPREDNISOLONE SODIUM SUCCINATE 62.5 MG: 125 INJECTION, POWDER, FOR SOLUTION INTRAMUSCULAR; INTRAVENOUS at 15:51

## 2017-05-19 RX ADMIN — HYDROCORTISONE SODIUM SUCCINATE 100 MG: 100 INJECTION, POWDER, FOR SOLUTION INTRAMUSCULAR; INTRAVENOUS at 09:17

## 2017-05-19 RX ADMIN — NICOTINE 1 PATCH: 14 PATCH, EXTENDED RELEASE TRANSDERMAL at 15:49

## 2017-05-19 RX ADMIN — METHYLPREDNISOLONE SODIUM SUCCINATE 62.5 MG: 125 INJECTION, POWDER, FOR SOLUTION INTRAMUSCULAR; INTRAVENOUS at 23:46

## 2017-05-19 RX ADMIN — ACETAMINOPHEN 650 MG: 325 TABLET, FILM COATED ORAL at 15:50

## 2017-05-19 RX ADMIN — ALBUTEROL SULFATE 6 PUFF: 90 AEROSOL, METERED RESPIRATORY (INHALATION) at 10:16

## 2017-05-19 RX ADMIN — RACEPINEPHRINE HYDROCHLORIDE 0.5 ML: 11.25 SOLUTION RESPIRATORY (INHALATION) at 09:50

## 2017-05-19 RX ADMIN — MIDAZOLAM 2 MG: 1 INJECTION INTRAMUSCULAR; INTRAVENOUS at 09:10

## 2017-05-19 RX ADMIN — VENLAFAXINE HYDROCHLORIDE 37.5 MG: 37.5 TABLET, EXTENDED RELEASE ORAL at 13:43

## 2017-05-19 RX ADMIN — ONDANSETRON 4 MG: 2 INJECTION INTRAMUSCULAR; INTRAVENOUS at 09:05

## 2017-05-19 RX ADMIN — PROCHLORPERAZINE EDISYLATE 5 MG: 5 INJECTION INTRAMUSCULAR; INTRAVENOUS at 11:18

## 2017-05-19 RX ADMIN — ONDANSETRON 4 MG: 2 INJECTION INTRAMUSCULAR; INTRAVENOUS at 10:56

## 2017-05-19 RX ADMIN — IPRATROPIUM BROMIDE AND ALBUTEROL SULFATE 3 ML: .5; 3 SOLUTION RESPIRATORY (INHALATION) at 20:08

## 2017-05-19 RX ADMIN — HYDROXYZINE HYDROCHLORIDE 50 MG: 25 TABLET ORAL at 21:15

## 2017-05-19 RX ADMIN — SENNOSIDES AND DOCUSATE SODIUM 2 TABLET: 8.6; 5 TABLET ORAL at 20:27

## 2017-05-19 RX ADMIN — ALBUTEROL SULFATE 2 PUFF: 90 AEROSOL, METERED RESPIRATORY (INHALATION) at 08:02

## 2017-05-19 RX ADMIN — ALBUTEROL SULFATE 6 PUFF: 90 AEROSOL, METERED RESPIRATORY (INHALATION) at 09:13

## 2017-05-19 RX ADMIN — EPINEPHRINE 0.1 MG: 1 INJECTION, SOLUTION INTRAMUSCULAR; SUBCUTANEOUS at 09:30

## 2017-05-19 RX ADMIN — SODIUM CHLORIDE, POTASSIUM CHLORIDE, SODIUM LACTATE AND CALCIUM CHLORIDE: 600; 310; 30; 20 INJECTION, SOLUTION INTRAVENOUS at 11:00

## 2017-05-19 RX ADMIN — FLUOXETINE 30 MG: 20 CAPSULE ORAL at 13:44

## 2017-05-19 RX ADMIN — MONTELUKAST SODIUM 10 MG: 10 TABLET, FILM COATED ORAL at 20:27

## 2017-05-19 RX ADMIN — SODIUM CHLORIDE: 9 INJECTION, SOLUTION INTRAVENOUS at 20:27

## 2017-05-19 RX ADMIN — CLONAZEPAM 0.5 MG: 0.5 TABLET ORAL at 20:27

## 2017-05-19 RX ADMIN — EPINEPHRINE 0.1 MG: 1 INJECTION, SOLUTION INTRAMUSCULAR; SUBCUTANEOUS at 09:38

## 2017-05-19 RX ADMIN — CHOLECALCIFEROL CAP 125 MCG (5000 UNIT) 5000 UNITS: 125 CAP at 13:41

## 2017-05-19 RX ADMIN — HYDROXYZINE HYDROCHLORIDE 50 MG: 25 TABLET ORAL at 15:50

## 2017-05-19 RX ADMIN — ALBUTEROL SULFATE 2.5 MG: 2.5 SOLUTION RESPIRATORY (INHALATION) at 12:58

## 2017-05-19 RX ADMIN — IPRATROPIUM BROMIDE AND ALBUTEROL SULFATE 3 ML: .5; 3 SOLUTION RESPIRATORY (INHALATION) at 16:50

## 2017-05-19 RX ADMIN — TRAZODONE HYDROCHLORIDE 50 MG: 50 TABLET ORAL at 21:15

## 2017-05-19 RX ADMIN — PROCHLORPERAZINE EDISYLATE 5 MG: 5 INJECTION INTRAMUSCULAR; INTRAVENOUS at 14:34

## 2017-05-19 RX ADMIN — METHOHEXITAL SODIUM 60 MG: 500 INJECTION, POWDER, LYOPHILIZED, FOR SOLUTION INTRAMUSCULAR; INTRAVENOUS; RECTAL at 09:09

## 2017-05-19 RX ADMIN — MIDAZOLAM 2 MG: 1 INJECTION INTRAMUSCULAR; INTRAVENOUS at 09:08

## 2017-05-19 RX ADMIN — CLONAZEPAM 0.5 MG: 0.5 TABLET ORAL at 13:42

## 2017-05-19 RX ADMIN — SUCCINYLCHOLINE CHLORIDE 60 MG: 20 INJECTION, SOLUTION INTRAMUSCULAR; INTRAVENOUS at 09:09

## 2017-05-19 ASSESSMENT — LIFESTYLE VARIABLES: TOBACCO_USE: 1

## 2017-05-19 ASSESSMENT — ENCOUNTER SYMPTOMS: SEIZURES: 0

## 2017-05-19 ASSESSMENT — PAIN DESCRIPTION - DESCRIPTORS
DESCRIPTORS: ACHING;HEADACHE
DESCRIPTORS: ACHING;HEADACHE

## 2017-05-19 NOTE — OR NURSING
Attempted to wean O2 to RA, O2 sats dropped to 90%/91% with deep breaths.  Placed back on 1L NC.  Pt cont to c/o chest tightness, unchanged.  Also feeling anxious, has not had today's meds yet.  Awaiting MDA to return to assess pt and determine what unit pt will go to.

## 2017-05-19 NOTE — PROGRESS NOTES
Anesthesia request transfer to PACU for asthma exacerbation with an aborted ECT.    Heber Lemus MD

## 2017-05-19 NOTE — CONSULTS
"Social Work: Assessment with Discharge Plan    Patient Name:  Nuha Lees  :  1985  Age:  31 year old  MRN:  [id number]  Risk/Complexity Score:  Filed Complexity Screen Score: 3  Completed assessment with:  Pt, chart review    Presenting Information   Reason for Referral:  Mental illness  Date of Intake:  May 19, 2017  Referral Source:  Chart Review  Decision Maker:  pt  Alternate Decision Maker:  Spouse, Sohan Lees  Health Care Directive:  Provided education  Living Situation:  House  Previous Functional Status:  Independent  Patient and family understanding of hospitalization:  Pt came seeking ECT and treatment for SI, depression  Cultural/Language/Spiritual Considerations:  Pt suffers from Depression, Asthma and was pursuing ECT  Adjustment to Illness:  Pt states \"Today was a wake up call\". When asked for clarification, pt stated\" I no longer want to die\".  Pt denied having continued SI but did endorse further S/S of depression. She would be agreeable to return to [facility].    Physical Health  Reason for Admission:    1. Severe recurrent major depression without psychotic features (H)      Services Needed/Recommended:  Other:  return to [facility] for further Mental Health stabilization    Mental Health/Chemical Dependency  Diagnosis:  Depression, SI  Support/Services in Place:  Pt states she has a therapist and Psychiatrist. Will con't to see them as outpt. She may still be interested in pursuing ECT  Services Needed/Recommended:  Return to [facility] to initiate ECT    Support System  Significant relationship at present time:  Spouse, available via telephone  Family of origin is available for support:  Available via telephone  Other support available:  Pt has therapist, psychiatrist at home  Gaps in support system:  None ntoed  Patient is caregiver to:  None     Provider Information   Primary Care Physician:  Anabel Lord MD   [phone]   Clinic:  [address] " "[address] / [place]*      :      Financial   Income Source:  Spouse employment  Financial Concerns:  None noted  Insurance:    Payor/Plan Subscriber Name Rel Member # Group #   [employer] CYNDI LAYTON*  [id number]       [id number], ASAD CEDILLO [id number]       Discharge Plan   Patient and family discharge goal:  Pt now thinking about DC home vs return to inpt psych. She states she  Trusts MDs and Psychiatrist recommendations  Provided education on discharge plan:  YES  Patient agreeable to discharge plan:  YES  Barriers to discharge:  Medical stability    Discharge Recommendations   Anticipated Disposition:  [facility]   Transportation Needs:  Other:  to be determined  Name of Transportation Company and Phone:  To be determined    Additional comments   Writer introduced role and reason for visit. Pt states she has asthma which caused her to not have ECT. She believes this was a \"wake up call\" as she realized she no longer has a wish to die but does con't to endorse feelings of hopelessness, helplessness and other S/S of depression. She is willing to return to station 30 if recommended by MDs.  She states she has outpt therapist and psychiatrist that she will con't to see when she returns home. She states she came here to try ECT \"but that did not work\". She is not certain if they will try again; she would be willing to try ECT for her depression. Writer provided support, education, reassurance. Pt was receptive. SW con't to follow for possible return to inpt psych.  "

## 2017-05-19 NOTE — PROGRESS NOTES
ECT aborted as patient noted to have bilious contents in her airway after induction medications had been given. Head immediately turned and she was suctioned copiously. No solid components noted. Patient with audible wheezing soon thereafter. Lungs auscultated and bilateral wheezing heard. Oxygen being administered. Albuterol administered. IV hydrocortisone administered (100 mg). Called for help. SQ Epi given x2 in doses of 0.1 mg (left shoulder). Patient with noted improvement over time. Initially, wheezing subsided, but breath sounds remained tight bilaterally. After second dose of epi, breath sounds were more easily auscultated bilaterally and no wheezes were heard. Patient awake and complained of a full sensation in the back of her throat. Physical exam revealed no changes from pre-op exam. Racemic epi nebulizer administered with relief per patient. Throughout all of this, patient was initially being administered oxygen via face mask from the anesthesia machine (100% FiO2). We were able to transition to 10L simple face mask and eventually 6L simple face mask. Despite clinical improvements, patient requiring oxygen by face mask at at least 6 L and rapidly desaturates when it is removed. Plan to send patient to main PACU for CXR, ABG, closer monitoring. She will need to be admitted either to a medical floor or possibly even ICU depending upon her oxygen needs.

## 2017-05-19 NOTE — ANESTHESIA PREPROCEDURE EVALUATION
Anesthesia Evaluation     .             ROS/MED HX    ENT/Pulmonary:     (+)tobacco use, Current use Intermittent asthma Treatment: Inhaler prn,  , . .   (-) sleep apnea and recent URI   Neurologic:      (-) seizures   Cardiovascular:  - neg cardiovascular ROS       METS/Exercise Tolerance:     Hematologic:         Musculoskeletal:  - neg musculoskeletal ROS       GI/Hepatic:  - neg GI/hepatic ROS       Renal/Genitourinary:  - ROS Renal section negative       Endo:  - neg endo ROS       Psychiatric:     (+) psychiatric history       Infectious Disease:  - neg infectious disease ROS       Malignancy:      - no malignancy   Other:    - neg other ROS                 Physical Exam  Normal systems: dental    Airway   Mallampati: I  TM distance: >3 FB  Neck ROM: full    Dental     Cardiovascular       Pulmonary                     Anesthesia Plan      History & Physical Review  History and physical reviewed and following examination; no interval change.    ASA Status:  2 .    NPO Status:  > 6 hours    Plan for General with Intravenous induction.          Postoperative Care      Consents  Anesthetic plan, risks, benefits and alternatives discussed with:  Patient..

## 2017-05-19 NOTE — OR NURSING
Dr. Ross at bedside.  Report given to RN on 10A.  Sitter being arranged due to suicidal precautions per Carolina Burns, Charge Nurse on 10A.  VSS.  Will transfer to floor after Dr. Ross done interviewing pt.  Ally Mcdonald RN to watch pt in PACU until able to transfer; Ally to update pt's  of new room number.  Dr. Milagros Segura stated she would put in postop note and pt ok to transfer.

## 2017-05-19 NOTE — H&P
DATE OF ADMISSION, EXAMINATION AND DICTATION:  05/19/2017.       The patient is being admitted to [facility].        CHIEF COMPLAINT:  Acute asthma attack during ECT induction.      HISTORY OF PRESENT ILLNESS:  Nuha Lees is a 31-year-old female with history of major depressive disorder, recurrent, severe, and what she indicates to be poorly controlled asthma, initially admitted to inpatient psychiatry ([facility] 30) on 05/18 in anticipation of ECT intervention for depression which has been refractory to medical management.  Cleared by Internal Medicine consultation on 05/18.  Exam demonstrated clear lung fields at that time.  Contacted by Anesthesia earlier today with indication that early during induction for ECT, the patient noted to have bilious material coming out of her mouth in support of aspiration.  Case aborted.  Noted to have severe bronchospasm.  Acute intervention with albuterol by nebulization x2, 100 mg of IV Solu-Medrol and subcu epinephrine x2.  Initially required 6-8 liters of oxygen to maintain O2 sat which had dropped into the low 80s.  Chest x-ray demonstrated no infiltrate.  Normal EKG.  Arterial blood gas pH 7.36, pCO2 of 44, pO2 of 77, bicarbonate 25, FIO2 of 21%.  The patient transferred to the PACU for closer clinical monitoring.  Interval improvement.  Noted to be breathing comfortably with resolution of bronchospasm.  Adequate O2 sats on 1 liter by nasal cannula at 94%.      First ECT per patient.  No prior issues with general anesthesia.  Indicates at baseline, her asthma has not been fully compensated.  Use of albuterol metered-dose inhaler p.r.n. in addition to, I believe, Breo Ellipta.  Indicates no prior asthma related hospitalization.  ER visit last Thanksgiving.  Placed on short-term steroids at that time.  Use of nebulization p.r.n. with no recent requirement.  Clinically, at present, the patient notes residual anterior chest tightness with pain on deep inspiratory effort.  Ongoing  "cough productive of scant yellow secretions.  Residual sense of dyspnea, but no noted clinical distress.  Nausea without further emesis.  Has noted emesis prior to admission.  No symptoms of acid reflux.      PAST MEDICAL HISTORY:   1.  Asthma suggested to be incompletely compensated.   2.  Major depressive disorder.   3.  Generalized anxiety disorder.   4.  History of nephrolithiasis.      PAST SURGICAL HISTORY:   1.  Left eyelid ptosis surgery 09/27/2011.   2.  Mirena IUD insertion 05/20/2008.   3.  Right knee surgery for ligament repair.   4.  Tubal ligation.      Without known heart disease, diabetes, hypertension, intrinsic renal disease, peptic ulcer disease, hepatitis, gallbladder disease, thyroid disease, seizure, tuberculosis.      ALLERGIES:  Include codeine sulfate, sulfa drugs.      MEDICATIONS PRIOR TO ADMISSION:   1.  Albuterol 2 puffs q.6h. p.r.n.   2.  Abilify 5 mg daily.   3.  Vitamin D3 5000 units daily.   4.  Clonazepam 0.5 mg 3 times daily.   5.  Prozac 60 mg daily.   6.  Flonase 2 sprays both nostrils daily.   7.  Singulair 10 mg at bedtime.   8.  Hydroxyzine 10-20 mg every 4 hours p.r.n. pruritus.      FAMILY HISTORY:  Father with heart disease.  Also, a family history of mental illness.      HABITS:  Continues to smoke 1/2 pack per day.  Nicotine patch in place.  Alcohol none.  Use of marijuana daily.      SOCIAL HISTORY:  .  Two children.  Presently off work.      REVIEW OF SYSTEMS:  Denies fever, chills or sweats.  Right frontal headache presently.  No dizziness.  Has had visual change attributed to meds.  Cardiopulmonary symptoms as above.  Nausea with above induction for ECT.  Bilious reflux or emesis.  Otherwise, no reflux.  No abdominal pain.  Last bowel movement 2 days ago.  No signs of GI blood loss.  No voiding complaints.  No arthralgias, rash or focal neurologic complaint.  Emotionally, patient feels \"bummed.\"  Indicates that she does feel somewhat suicidal, but denies that " "she would act on self-harm.  Has had past history of \"slitting her ankles.\"      OBJECTIVE:   GENERAL:  Relatively healthy-appearing female lying semi-upright on the cart in no apparent distress.  Alert and oriented.   VITAL SIGNS:  Blood pressure 96/57, heart rate 80s and regular, respirations not labored, O2 sat 94% on 1 liter by nasal cannula.  Temperature normal.   HEENT:  Extraocular movements full.  No nystagmus.  Pupils equal and reacting symmetrically.  Sclerae nonicteric.  Fundi deferred.  Oropharynx is clear.  Dentition adequate repair.  Mucosal membranes are dry.  Carotid upstroke brisk.  No bruits.  There is no thyromegaly or lymphadenopathy.   SKIN:  No rash.   CHEST:  Demonstrates reduced inspiratory effort/air movement which the patient attributes to anterior chest discomfort.  With effort is able to take a deeper inspiration without audible rales, rhonchi or bronchospasm at this time.   CARDIAC:  Regular without audible gallop or murmur.  No click, no jugular venous distention.   BREASTS:  Exam deferred.   ABDOMEN:  Nondistended, soft, nontender, without palpable organomegaly or mass.  Bowel sounds are present.  No epigastric or ileac bruits.   EXTREMITIES:  Distal lower extremities are well perfused, no cyanosis or clubbing.  There is no edema.   MUSCULOSKELETAL:  No posterior calf or thigh tenderness/induration to suggest DVT.   PELVIC/RECTAL:  Deferred.   NEUROLOGIC:  Grossly nonfocal.      LABORATORY DATA:  2017 includes complete metabolic profile with normal electrolytes, BUN 16, creatinine 1.01.  Normal liver function.  Random glucose 83, white count 9200, hemoglobin 13 grams percent, platelet count of 225,000.      ASSESSMENT:  A 31-year-old female admitted with the followin.  Exacerbation of inadequately controlled asthma during ECT induction.  Bilious reflux or emesis concerning for aspiration.  No infiltrates on chest x-ray.  Significant hypoxemia which has improved.  " Oxygenating adequately on 1 liter by nasal cannula.  Concern regarding limited air movement at present which appears at least to a significant degree contributed to by anterior chest discomfort.  Clinically, patient does not appear in respiratory distress at this time.  A disease aggravated by ongoing smoking habit.   2.  Major depressive disorder, refractory to outpatient management.  Aborted ECT earlier today (as above).   3.  Generalized anxiety disorder.   4.  History of nephrolithiasis, quiescent.   5.  Surgeries as above.      PLAN:   1.  Admit to Medicine.   2.  With risk of potential or chemical pneumonitis will continue with scheduled IV Solu-Medrol in addition to scheduled DuoNeb.   3.  Continue hour of sleep Singulair.   4.  Consider followup imaging to ensure no interval infiltrates.  Hold on antibiotics for now.   5.  Resume psychiatric medication regimen.   6.  1:1 sitter with suicide precautions.   7.  Activity as tolerated.  PCDs.  With issue of nausea and apparent emesis will start with clears.  Advance diet as tolerated.  IV fluid support   8.  Close clinical monitoring with continuous oximetry.         DAVIDA PINK MD             D: 2017 14:44   T: 2017 17:29   MT: MARY ANNE      Name:     BRIANNA LAYTON   MRN:      [id number]        Account:      [id number]   :      1985           Admitted:     [id number]      Document: [id number]       cc: Anabel Lord MD

## 2017-05-19 NOTE — ANESTHESIA POSTPROCEDURE EVALUATION
Patient: Nuha Lees    * No procedures listed *    Diagnosis:Severe recurrent major depression without psychotic features (H) [F33.2]  Diagnosis Additional Information: No value filed.    Anesthesia Type:  General    Note:  Anesthesia Post Evaluation    Patient location during evaluation: PACU  Patient participation: Able to fully participate in evaluation  Level of consciousness: awake and alert  Pain management: adequate  Airway patency: patent  Cardiovascular status: acceptable  Respiratory status: nasal cannula  Hydration status: acceptable  PONV: none     Anesthetic complications: yes  Specific anesthetic complications: Unplanned ICU Admission     Comments: Saw patient in main PACU. Her vital signs are stable and she is saturating 95% on 1L NC. Her breath sounds are clear bilaterally without wheezes. Her breathing feels much improved and she no longer has a sensation of fullness in the back of her throat. She still complains of some chest tightness/burning that is somewhat constant in nature (not worse on inspiration). It is improved since coming up from ECT. I reviewed her ABG, CXR, and EKG and they are unremarkable. I personally discussed the events of the case, the patient's current status, and the ordered labs and their results with the accepting hospitalist (Dr. Ross). I also discussed the events with the patient. She understands. Her questions were answered.         Last vitals:  Vitals:    05/19/17 1300 05/19/17 1306 05/19/17 1315   BP:  97/56    Pulse:      Resp: 9 17 22   Temp:      SpO2: 98% 97% 93%         Electronically Signed By: Cuca Segura MD  May 19, 2017  1:33 PM

## 2017-05-19 NOTE — PROGRESS NOTES
"   05/18/17 2200   Behavioral Health   Hallucinations denies / not responding to hallucinations   Thinking intact   Orientation person: oriented;place: oriented;date: oriented;time: oriented   Memory baseline memory   Insight insight appropriate to situation;insight appropriate to events   Judgement intact   Eye Contact at examiner   Affect full range affect   Mood depressed;hopeless;anxious   Physical Appearance/Attire attire appropriate to age and situation   Hygiene well groomed   Suicidality other (see comments)  (denies)   Self Injury other (see comment)  (denies)   Activity other (see comment)  (attended group, social)   Speech clear;coherent   Medication Sensitivity no stated side effects;no observed side effects   Psychomotor / Gait balanced;steady   Activities of Daily Living   Hygiene/Grooming independent   Oral Hygiene independent   Dress street clothes;independent   Laundry with supervision   Room Organization independent     Pt attended and participated in group today said, \"feeling overall crappy.\"  Went to bed early at 1900.  "

## 2017-05-20 LAB
ANION GAP SERPL CALCULATED.3IONS-SCNC: 8 MMOL/L (ref 3–14)
BUN SERPL-MCNC: 16 MG/DL (ref 7–30)
CALCIUM SERPL-MCNC: 8.7 MG/DL (ref 8.5–10.1)
CHLORIDE SERPL-SCNC: 109 MMOL/L (ref 94–109)
CO2 SERPL-SCNC: 24 MMOL/L (ref 20–32)
CREAT SERPL-MCNC: 0.73 MG/DL (ref 0.52–1.04)
ERYTHROCYTE [DISTWIDTH] IN BLOOD BY AUTOMATED COUNT: 12.3 % (ref 10–15)
GFR SERPL CREATININE-BSD FRML MDRD: ABNORMAL ML/MIN/1.7M2
GLUCOSE SERPL-MCNC: 149 MG/DL (ref 70–99)
HCT VFR BLD AUTO: 40.4 % (ref 35–47)
HGB BLD-MCNC: 13.3 G/DL (ref 11.7–15.7)
LACTATE BLD-SCNC: 3 MMOL/L (ref 0.7–2.1)
MCH RBC QN AUTO: 29.7 PG (ref 26.5–33)
MCHC RBC AUTO-ENTMCNC: 32.9 G/DL (ref 31.5–36.5)
MCV RBC AUTO: 90 FL (ref 78–100)
PLATELET # BLD AUTO: 241 10E9/L (ref 150–450)
POTASSIUM SERPL-SCNC: 3.8 MMOL/L (ref 3.4–5.3)
RBC # BLD AUTO: 4.48 10E12/L (ref 3.8–5.2)
SODIUM SERPL-SCNC: 141 MMOL/L (ref 133–144)
WBC # BLD AUTO: 20.8 10E9/L (ref 4–11)

## 2017-05-20 PROCEDURE — 25000125 ZZHC RX 250: Performed by: INTERNAL MEDICINE

## 2017-05-20 PROCEDURE — 80048 BASIC METABOLIC PNL TOTAL CA: CPT | Performed by: PHYSICIAN ASSISTANT

## 2017-05-20 PROCEDURE — 99232 SBSQ HOSP IP/OBS MODERATE 35: CPT | Performed by: INTERNAL MEDICINE

## 2017-05-20 PROCEDURE — 12000001 ZZH R&B MED SURG/OB UMMC

## 2017-05-20 PROCEDURE — 25000132 ZZH RX MED GY IP 250 OP 250 PS 637: Performed by: NURSE PRACTITIONER

## 2017-05-20 PROCEDURE — 25000128 H RX IP 250 OP 636: Performed by: INTERNAL MEDICINE

## 2017-05-20 PROCEDURE — 25000128 H RX IP 250 OP 636: Performed by: PHYSICIAN ASSISTANT

## 2017-05-20 PROCEDURE — 25000132 ZZH RX MED GY IP 250 OP 250 PS 637: Performed by: PHYSICIAN ASSISTANT

## 2017-05-20 PROCEDURE — 83605 ASSAY OF LACTIC ACID: CPT | Performed by: INTERNAL MEDICINE

## 2017-05-20 PROCEDURE — 99222 1ST HOSP IP/OBS MODERATE 55: CPT | Performed by: PSYCHIATRY & NEUROLOGY

## 2017-05-20 PROCEDURE — 36415 COLL VENOUS BLD VENIPUNCTURE: CPT | Performed by: INTERNAL MEDICINE

## 2017-05-20 PROCEDURE — 40000275 ZZH STATISTIC RCP TIME EA 10 MIN

## 2017-05-20 PROCEDURE — 36415 COLL VENOUS BLD VENIPUNCTURE: CPT | Performed by: PHYSICIAN ASSISTANT

## 2017-05-20 PROCEDURE — 40000917 ZZH STATISTIC PEAK FLOW MEASUREMENT

## 2017-05-20 PROCEDURE — 25000125 ZZHC RX 250: Performed by: PHYSICIAN ASSISTANT

## 2017-05-20 PROCEDURE — 85027 COMPLETE CBC AUTOMATED: CPT | Performed by: PHYSICIAN ASSISTANT

## 2017-05-20 PROCEDURE — 94640 AIRWAY INHALATION TREATMENT: CPT

## 2017-05-20 PROCEDURE — 94640 AIRWAY INHALATION TREATMENT: CPT | Mod: 76

## 2017-05-20 PROCEDURE — 25000132 ZZH RX MED GY IP 250 OP 250 PS 637: Performed by: PSYCHIATRY & NEUROLOGY

## 2017-05-20 RX ORDER — IPRATROPIUM BROMIDE AND ALBUTEROL SULFATE 2.5; .5 MG/3ML; MG/3ML
3 SOLUTION RESPIRATORY (INHALATION) 4 TIMES DAILY
Status: DISCONTINUED | OUTPATIENT
Start: 2017-05-20 | End: 2017-05-21 | Stop reason: HOSPADM

## 2017-05-20 RX ORDER — PREDNISONE 20 MG/1
40 TABLET ORAL DAILY
Status: DISCONTINUED | OUTPATIENT
Start: 2017-05-21 | End: 2017-05-21 | Stop reason: HOSPADM

## 2017-05-20 RX ORDER — AMOXICILLIN 250 MG
2 CAPSULE ORAL 2 TIMES DAILY PRN
Status: DISCONTINUED | OUTPATIENT
Start: 2017-05-20 | End: 2017-05-21 | Stop reason: HOSPADM

## 2017-05-20 RX ORDER — SODIUM CHLORIDE 9 MG/ML
INJECTION, SOLUTION INTRAVENOUS CONTINUOUS
Status: DISCONTINUED | OUTPATIENT
Start: 2017-05-20 | End: 2017-05-21 | Stop reason: HOSPADM

## 2017-05-20 RX ORDER — PREDNISONE 20 MG/1
40 TABLET ORAL ONCE
Status: COMPLETED | OUTPATIENT
Start: 2017-05-20 | End: 2017-05-20

## 2017-05-20 RX ADMIN — CLONAZEPAM 0.5 MG: 0.5 TABLET ORAL at 20:23

## 2017-05-20 RX ADMIN — HYDROXYZINE HYDROCHLORIDE 50 MG: 25 TABLET ORAL at 09:54

## 2017-05-20 RX ADMIN — IPRATROPIUM BROMIDE AND ALBUTEROL SULFATE 3 ML: .5; 3 SOLUTION RESPIRATORY (INHALATION) at 08:22

## 2017-05-20 RX ADMIN — IPRATROPIUM BROMIDE AND ALBUTEROL SULFATE 3 ML: .5; 3 SOLUTION RESPIRATORY (INHALATION) at 16:02

## 2017-05-20 RX ADMIN — NICOTINE 1 PATCH: 14 PATCH, EXTENDED RELEASE TRANSDERMAL at 15:07

## 2017-05-20 RX ADMIN — IPRATROPIUM BROMIDE AND ALBUTEROL SULFATE 3 ML: .5; 3 SOLUTION RESPIRATORY (INHALATION) at 19:41

## 2017-05-20 RX ADMIN — CHOLECALCIFEROL CAP 125 MCG (5000 UNIT) 5000 UNITS: 125 CAP at 07:52

## 2017-05-20 RX ADMIN — ACETAMINOPHEN 650 MG: 325 TABLET, FILM COATED ORAL at 13:16

## 2017-05-20 RX ADMIN — FLUTICASONE PROPIONATE 2 SPRAY: 50 SPRAY, METERED NASAL at 07:51

## 2017-05-20 RX ADMIN — SODIUM CHLORIDE: 9 INJECTION, SOLUTION INTRAVENOUS at 10:30

## 2017-05-20 RX ADMIN — VENLAFAXINE HYDROCHLORIDE 75 MG: 75 TABLET, EXTENDED RELEASE ORAL at 07:52

## 2017-05-20 RX ADMIN — TRAZODONE HYDROCHLORIDE 50 MG: 50 TABLET ORAL at 20:23

## 2017-05-20 RX ADMIN — IPRATROPIUM BROMIDE AND ALBUTEROL SULFATE 3 ML: .5; 3 SOLUTION RESPIRATORY (INHALATION) at 12:24

## 2017-05-20 RX ADMIN — CLONAZEPAM 0.5 MG: 0.5 TABLET ORAL at 07:52

## 2017-05-20 RX ADMIN — IPRATROPIUM BROMIDE AND ALBUTEROL SULFATE 3 ML: .5; 3 SOLUTION RESPIRATORY (INHALATION) at 00:07

## 2017-05-20 RX ADMIN — HYDROXYZINE HYDROCHLORIDE 50 MG: 25 TABLET ORAL at 15:07

## 2017-05-20 RX ADMIN — IPRATROPIUM BROMIDE AND ALBUTEROL SULFATE 3 ML: .5; 3 SOLUTION RESPIRATORY (INHALATION) at 12:25

## 2017-05-20 RX ADMIN — ACETAMINOPHEN 650 MG: 325 TABLET, FILM COATED ORAL at 08:06

## 2017-05-20 RX ADMIN — CLONAZEPAM 0.5 MG: 0.5 TABLET ORAL at 13:16

## 2017-05-20 RX ADMIN — METHYLPREDNISOLONE SODIUM SUCCINATE 62.5 MG: 125 INJECTION, POWDER, FOR SOLUTION INTRAMUSCULAR; INTRAVENOUS at 07:53

## 2017-05-20 RX ADMIN — MONTELUKAST SODIUM 10 MG: 10 TABLET, FILM COATED ORAL at 20:23

## 2017-05-20 RX ADMIN — PREDNISONE 40 MG: 20 TABLET ORAL at 15:07

## 2017-05-20 RX ADMIN — ACETAMINOPHEN 650 MG: 325 TABLET, FILM COATED ORAL at 17:26

## 2017-05-20 RX ADMIN — FLUTICASONE FUROATE AND VILANTEROL TRIFENATATE 1 PUFF: 200; 25 POWDER RESPIRATORY (INHALATION) at 07:52

## 2017-05-20 ASSESSMENT — PAIN DESCRIPTION - DESCRIPTORS: DESCRIPTORS: ACHING

## 2017-05-20 NOTE — CONSULTS
PSYCHIATRIC CONSULTATION       TODAY'S DATE:  05/20/2017        IDENTIFICATION:  Ms. Nuha Lees is a 31-year-old  white female who lives in [place], [place] with her  and 3 children.  I am asked to evaluate her psychiatric status by Dr. Ross.      HISTORY OF PRESENT ILLNESS:  Ms. Lees has a history of anxiety, which goes back to puberty and serious depression that goes back to her 20s.  She has had multiple episodes of recurrent major depressive disorder and some have been quite severe.  She has been hospitalized 4 times with depression.  Her most recent depression was lasting for 2 years and included changes in sleep, decreased appetite, but weight gain, anhedonia, decreased interest and energy, poor concentration, feelings of helplessness, hopelessness, inappropriate guilt, crying spells and thoughts of suicide.  She has also had occasional episodes of mild paranoia along with her depressive episodes.  Her psychiatric history is well documented in a consultation completed by Dr. Heber Lemus on 05/19/2017 and I do direct the reader to that informative document.      The patient went to an intake at [facility] in [place] where the psychiatrist felt she would be best served with inpatient ECT.  She was then transferred to our facility to receive ECT but before she could receive her treatment she unfortunately had an episode of bronchospasm with vomiting, likely mild aspiration and asthma exacerbation.  The ECT was aborted and she has been treated aggressively for an asthma exacerbation with high dose steroids and transferred to Medicine.      On interviewing the patient, she reports that this experience has been quite good for her because she reports she has decided that she definitely wants to live.  She is no longer suicidal and reports her mood has improved.  She realizes this may simply be secondary to high dose steroid therapy, but she seems to be quite grateful  for whatever reason her depression seems to have lifted.  She realizes that this may be a temporary remission and she is planning to get a psychiatrist and she is also quite encouraged that she has been started on Effexor, a medication that she has not been on in the past.  It is worth noting that she did have a very significant allergic reaction to the medication Trintellix.  I have added that to her allergy list.      PAST MEDICAL HISTORY:  Asthma, tubal ligation, knee surgery, history of ptosis and kidney stones.        ALLERGIES:  Codeine, sulfa drugs and Trintellix.      FAMILY HISTORY:  The patient's mother had depression as did her aunt, grandmother and cousin.  The patient's mother was also alcoholic.  There is a cousin who unfortunately completed suicide.      SOCIAL HISTORY:  The patient was born in [place], [place] and raised in [place], [place], where she currently lives.  She has 3 sisters.  The patient's mother is reported to have been very depressed and neglectful.  Patient has been  twice.  She has 2 daughters.  She is currently on short-term disability.  She smokes marijuana for the treatment of depression, otherwise does not abuse drugs or alcohol.      PSYCHIATRIC REVIEW OF SYSTEMS:  On my interview, the patient denies headache or problems with vision or hearing.  She does report shortness of breath after walking up and down the hospital station but not while resting.  She denies chest pain.  She denies abdominal pain or diarrhea.  She has had some constipation.  She denies genitourinary symptoms or problems with muscles, skin or joints.  There are no known endocrinopathies.      MENTAL STATUS EXAMINATION:  On my interview, the patient was a pleasant, cooperative white female.  Her mood was reported as improved.  Her affect was full and appropriate.  Her speech was coherent and goal oriented.  Her associations were tight and her thought processes logical and linear.  Content of thought  was without current psychosis, and she denies suicidal ideation.  Recent and remote memory, concentration, fund of knowledge and use of language appear to be at baseline.  She is alert and oriented x3.  Insight and judgment are intact.  Muscle strength and tone are at baseline.  Recent vital signs include a temperature of 97.1, heart rate 83, respiration rate of 16 with 96% oxygen saturation and a blood pressure of 94/50.        ASSESSMENT:  Recurrent major depressive disorder, currently in very early remission and also generalized anxiety disorder and panic disorder.      RECOMMENDATIONS:  Continue Effexor but would increase to 150 mg p.o. q.a.m.  Please discontinue sitter.  The patient may discharge home with family.  She reports that she has a regular therapist and is working to get a psychiatrist.         DAVIDA HU MD             D: 2017 12:14   T: 2017 13:05   MT: [email or web address]      Name:     BRIANNA LAYTON   MRN:      [id number]        Account:       [id number]   :      1985           Consult Date:  2017      Document: [id number]

## 2017-05-20 NOTE — CONSULTS
DATE OF CONSULTATION:  05/19/2017.       REQUESTING SOURCE:  Dr. Milton Duke.       REASON FOR CONSULTATION:  Please evaluate second opinion for ECT.      IDENTIFICATION:  Ms. Nuha Lees is a 31-year-old   woman who lives in [place] with her  and 3 kids.  She has no psychiatrist.  Her therapist is Rebecca Rincon in [place].  She was admitted to [facility] 05/17/2017.  She had planned on attending the [facility] program in [place] but during the intake, the psychiatrist recommended she do inpatient ECT.  Her therapist also recommended ECT.  She was then transported to [facility] for psychiatric admission.      HISTORY OF PRESENT ILLNESS:  Ms. Lees was staffed by Dr. Lemus on 05/19/2017.  She reports a history of depression, anxiety and panic disorder with agoraphobia.  Her depression started in her early 20s.  She has had periods of remission lasting up to 1-1/2 years several times in the past.  Her current depressive episode has been going on for 2 years.  She says it started when her dad got very sick.  She says she either sleeps all the time or not at all or sleep changes from day.  Appetite is decreased.  She has gained 45 pounds over the past couple of months.  She is anhedonic.  She says she has energy at times, but mostly her energy is down.  Libido is decreased.  Concentration is poor.  She feels hopeless, helpless, worthless, and guilty.  She has had crying spells.  She has had suicidal thoughts off and on since age 22.  These have increased since Christmas 2016.  She had been thinking of shooting herself in the bathtub with the curtains closed so that the blood would not splatter everywhere.  She says that is why her  got rid of the guns at home.  She denies hearing voices or seeing things.  Sometimes she feels a bit paranoid.  If she sees someone talking on the  phone and she cannot make out what they are saying, she thinks it is about her.  She started having panic attacks in 12/2016.  These occur out of the blue and occur about once a week lasting 5-15 minutes.  She describes sudden onset of anxiety with tachycardia, shortness of breath, clammy skin and tremor.  Her mind races and she cannot sit still.  She has become agoraphobic and gets anxious when she has to leave the house.  The house is her safe place.  She has stopped leaving the house except to go to doctor's visits.  She worries excessively.  She endorses muscle tension, poor concentration, fatigue, irritability and sleep disturbance.  She says her temper can explode out of the blue occasionally.  She denies PTSD symptoms.  When asked about obsessive-compulsive symptoms, she says everything has to be in 3's.  She will splash her face with water 3 times.  She taps things 3 times.  She cuts all of her food 3 times.  Memory has been poor.  Physically she feels out of shape due to her weight gain and asthma.  When asked about eating disorder, she says that years ago when she was depressed, she lost 125 pounds.  That was about 4-5 years ago.  It did not appear to be actually related to an eating disorder.  She denies gambling.  Stressors include her dad's illness, her weight and work.  She is on short-term disability which runs out 05/24/2017.  She will then lose her job and lose her insurance.  Her insurance is through her job.      PAST PSYCHIATRIC HISTORY:  Ms. Lees reports generalized anxiety symptoms since puberty.  Depression started in her early 20s.  She has had 4 psychiatric admissions including the current admission at [facility].  She was admitted to Psychiatry in [place] 4 weeks ago and had a previous admission in [place] and one in [place].  All of these were for depression and suicidal ideation.  She started psychotherapy about 11 years ago and has seen  therapists off and on since.  She saw a new psychiatrist, Dr. Eduarda Graf in [place] about a month ago and was admitted by that doctor to the hospital.  She says she is no longer seeing Dr. Graf.  Her therapist is Dr. Rebecca Rincon in [place].  Psychotropic medications used have included Wellbutrin, Celexa, Cymbalta, Trintellix, Prozac, Lexapro, melatonin, Unisom, Abilify, Paxil and possibly others.  She said she had a suicide attempt in the past by trying to slit her ankles.  There are no guns in the home.   removed them and took them to his dad's.  She has never had ECT.  Her psychiatrist who recently did an intake for the [facility] in [place] recommended ECT and her psychotherapist recommended ECT.  She watched the ECT video and says that she would like to pursue ECT.  Questions about ECT were answered.  Ms. Lees says there was a question of possible bipolar illness.  She says twice in her entire life she felt she had elevated mood with a lot of energy that lasted a couple of hours.  Other than those 2 times lasting a few hours, she has has never felt that way.        CHEMICAL DEPENDENCY HISTORY:  Ms. Lees denies any current or past problems with alcohol.  She smokes marijuana every day.  She smokes a pack of cigarettes per day.      PAST MEDICAL HISTORY:  Asthma, history of tubal ligation, history of knee surgery to repair torn ligaments from roller derby accident, history of ptosis repair of left upper lid, kidney stones.        MEDICATIONS:   1.  Albuterol 2 puffs q.6h. p.r.n.   2.  Abilify 5 mg daily.   3.  Vitamin D3 5000 units daily.   4.  Klonopin 0.5 mg t.i.d.   5.  Prozac 60 mg daily.   6.  Flonase 2 sprays both nostrils daily.   8.  Singulair 10 mg each day at bedtime.   9.  Vistaril 10-20 mg q.4 h. p.r.n.      ALLERGIES:  Codeine, sulfa.      FAMILY HISTORY:  Depression in mother, aunt, grandmother and cousin.  Mother was alcoholic.  A cousin committed suicide.   Father had congestive heart failure.      SOCIAL HISTORY:  Ms. Lees was born in [place], [place].  She was raised in [place], [place], by her parents.  She has 3 sisters and no brothers.  Father was a .  Mother was a housewife.  Mother had depression and neglected the kids.  Parents  when Ms. Lees was in her early 20s.  Mother moved back to [place].  Ms. Lees graduated high school and took some college classes.  She was  to her first  for 2-1/2 years, and has 2 daughters, ages 9 and 11 by him.  She has been with her second  for 4 years.  He has a 19-year-old son.  Ms. Lees lives in [place] with her , 19-year-old colleen and daughters.  She is on short-term disability.  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  She says that she is on short-term disability which will run out 05/24/2017 at which point she will lose her job and her insurance.   was a , but is on disability from the [employer].  She denies legal problems.  She was never in the .      MENTAL STATUS EXAMINATION:  Ms. Lees is an adequately groomed 31-year-old  woman looking her stated age.  Gait and station are normal.  Psychomotor activity is within normal limits.  Speech is fluent and normal in rate.  Language is normal.  Mood is depressed and anxious.  Affect is sad.  Attention and concentration appear adequate.  Thought process is normal.  Associations are normal.  She denied any psychotic symptoms.  She endorsed recent suicidal thoughts, which led up to her admission.  She feels safe in the hospital and is able to contract for safety.  She denied homicidal thoughts.  Fund of knowledge is adequate.  Remote and recent memory are adequate.  Insight and judgment appear adequate.  She was alert and oriented x3.  There was no evidence of movement disorder.        ASSESSMENT:  Ms. Lees is a 31-year-old woman with a history of depression and  anxiety.  She has had multiple psychiatric admissions.  She had been feeling increasingly suicidal lately.  Her outpatient psychiatrist and her therapist both recommended admission for ECT.  She was admitted to [facility] [place] [facility] and was medically cleared for ECT.  Ms. Lees watched the ECT video.  Questions about ECT were answered.  She appeared to have the capacity to give informed consent for ECT.      DIAGNOSES:   Axis I:     1.  Major depressive disorder, recurrent.    2.  Panic disorder with agoraphobia.   3.  Generalized anxiety disorder.   Axis II:  Deferred.      PLAN:   1.  Begin right-sided unilateral ultra-brief ECT treatment.   2.  Expect stabilization and discharge to home with family.         CANDIE SAGASTUME MD             D: 2017 19:18   T: 2017 20:28   MT: MARY ANNE      Name:     BRIANNA LEES   MRN:      [id number]        Account:       [id number]   :      1985           Consult Date:  2017      Document: [id number]

## 2017-05-20 NOTE — PLAN OF CARE
Problem: Goal Outcome Summary  Goal: Goal Outcome Summary   Observation goals  Comments: List all goals to be met before discharge home:   1. Improvement of Peak Flow to greater than 70% sustained off nebulizer for 4 hours.   2. Dyspnea improved and oxygen saturations greater than 88% on room air or prior home oxygen levels   3. Vitals signs normal or at patient baseline   4.Safe disposition plan has been identified   5.Nurse to notify provider when observation goals have been met and patient is ready for discharge.      11:00 AM  Observation goals  Comments: List all goals to be met before discharge home:   1. Improvement of Peak Flow to greater than 70% sustained off nebulizer for 4 hours.   2. Dyspnea improved and oxygen saturations greater than 88% on room air or prior home oxygen levels: Pt is on RA, sats above 90%  3. Vitals signs normal or at patient baseline : VSS  4.Safe disposition plan has been identified : DC home tomorrow  5.Nurse to notify provider when observation goals have been met and patient is ready for discharge.  1400   Observation goals  Comments: List all goals to be met before discharge home:   1. Improvement of Peak Flow to greater than 70% sustained off nebulizer for 4 hours.   2. Dyspnea improved and oxygen saturations greater than 88% on room air or prior home oxygen levels: Mild SOB with activity, O2 sats above 95% on RA  3. Vitals signs normal or at patient baseline: VSS  4.Safe disposition plan has been identified: DC home  5.Nurse to notify provider when observation goals have been met and patient is ready for discharge.      Sitter dc'd at 1200 today by psychiatry. Alert and oriented. LS clear. BS active. Voiding adequate amounts. Tolerating diet. Ambulating independently to bathroom and has steady gait. Pt is able to make needs known. Continue to monitor.

## 2017-05-20 NOTE — PLAN OF CARE
Problem: Goal Outcome Summary  Goal: Goal Outcome Summary   Observation goals  Comments: List all goals to be met before discharge home:   1. Improvement of Peak Flow to greater than 70% sustained off nebulizer for 4 hours.   2. Dyspnea improved and oxygen saturations greater than 88% on room air or prior home oxygen levels   3. Vitals signs normal or at patient baseline   4.Safe disposition plan has been identified   5.Nurse to notify provider when observation goals have been met and patient is ready for discharge.     Outcome: Improving  0600  1. Improvement of Peak Flow to greater than 70% sustained off nebulizer for 4 hours.   2. Dyspnea improved and oxygen saturations greater than 88% on room air or prior home oxygen levels. Yes sats above 90% RA on continuous pulse ox monitor.  3. Vitals signs normal or at patient baseline. Yes stable  4.Safe disposition plan has been identified. No     Lactic 0000 - 3.3 at 0600 = 3.0.  Tele SR. Had shower. Independent in the room. Sitter at bedside.

## 2017-05-20 NOTE — PLAN OF CARE
Problem: Goal Outcome Summary  Goal: Goal Outcome Summary   Observation goals  Comments: List all goals to be met before discharge home:   1. Improvement of Peak Flow to greater than 70% sustained off nebulizer for 4 hours.   2. Dyspnea improved and oxygen saturations greater than 88% on room air or prior home oxygen levels   3. Vitals signs normal or at patient baseline   4.Safe disposition plan has been identified   5.Nurse to notify provider when observation goals have been met and patient is ready for discharge.     Outcome: No Change  Pt is alert and oriented. Sitter at bedside for suicide precaution. . On tele monitor with SR and Sinus Bradycardia when sleeping DE 56-59. PIV is infusing in the beginning then saline locked when 1L NS was infused.      0000  1. Improvement of Peak Flow to greater than 70% sustained off nebulizer for 4 hours.   2. Dyspnea improved and oxygen saturations greater than 88% on room air or prior home oxygen levels. Yes sats above 90% RA on continuous pulse ox monitor.  3. Vitals signs normal or at patient baseline. Soft BP  4.Safe disposition plan has been identified. No     0200  1. Improvement of Peak Flow to greater than 70% sustained off nebulizer for 4 hours.   2. Dyspnea improved and oxygen saturations greater than 88% on room air or prior home oxygen levels. Yes sats above 90% RA on continuous pulse ox monitor.  3. Vitals signs normal or at patient baseline. Sleeping  4.Safe disposition plan has been identified. No     0400  1. Improvement of Peak Flow to greater than 70% sustained off nebulizer for 4 hours.   2. Dyspnea improved and oxygen saturations greater than 88% on room air or prior home oxygen levels. - Yes sats above 90% RA on continuous pulse ox monitor.  3. Vitals signs normal or at patient baseline. - Sleeping  4.Safe disposition plan has been identified. - No

## 2017-05-20 NOTE — PLAN OF CARE
Problem: Goal Outcome Summary  Goal: Goal Outcome Summary   Observation goals  Comments: List all goals to be met before discharge home:   1. Improvement of Peak Flow to greater than 70% sustained off nebulizer for 4 hours.   2. Dyspnea improved and oxygen saturations greater than 88% on room air or prior home oxygen levels   3. Vitals signs normal or at patient baseline   4.Safe disposition plan has been identified   5.Nurse to notify provider when observation goals have been met and patient is ready for discharge.     Outcome: Improving  Pt arrived on unit from PACU when changing shift around 3pm.  Alert and oriented x 4. Denies nausea, CP , SOB or dizziness/LH.   Lungs clear, no wheezes.  Infrequent cough, nonproductive. Encouraged IS.  Peak flow =150  Pt is here for Observation.  When medically stable , pt will go back to psych unit vs dc home. Reviewed Obs goals and care plan with pt.  Afebrile. Soft BP, RR 18-20. Weaned off Oxygen--SpO2 95% while sleeping.  Up independently, voiding w/o difficulty. No postural dizziness.    Good appetite with regular diet.  Pt is on IV steroid and scheduled neb tx Q4hrs.   Last BM 2days ago. No distention/ tenderness.    PRN Senokot-s  2tabs given.  Pt denies suicidal ideation. Flat affect and cooperative.   1:1 sitter at bedside for suicidal precautions.  C/o anxiety,  Scheduled Klonopin and prn vistaril given.  Headache resolved with coffee and Tylenol.   Lactic acid 4.0 ,  Repeat lactic acid at 23:30.  IV NS infusing at 150 ml/hr  Monitored pt every 1-2hrs with Observation goals.   Will continue to monitor.

## 2017-05-20 NOTE — PROGRESS NOTES
Discussed the patient's situation previously with Dr. Ross. I just discussed with the nurse now. The patient triggered the sepsis protocol and lactate is 4.0. She was quite sick and in respiratory distress earlier today. She has improved considerably. Her symptoms are felt to be secondary to aspiration, no concerns for infection. Her last blood pressure was fairly normal at 94/48. I think the lactate represents the prior issues including the administration of SQ epinephrine. We will give some gentle fluids and recheck the lactate later tonight to ensure it is improving.

## 2017-05-20 NOTE — PROVIDER NOTIFICATION
Moonlighter notified :  Sepsis protocol triggered,  lactic acid 4.0 and soft BP ( 80-90's/40-50's), afebrile.  SpO2 94-96% with 1L oxygen via NC.  Pt appears stable.    Natyer says that  prior epinepherine  and steroid injection likely cause high lactic acid.    Order received :  1. Repeat lactic acid in 4hrs   2. NS at 150ml/hr.

## 2017-05-20 NOTE — PROGRESS NOTES
"[facility] Internal Medicine Progress Note            Interval History:   Record reviewed including events from last evening.  Triggered sepsis protocol with elevated lactate 4.0, down to 3.0 this AM.  Gentle IV fluids.  Remains on scheduled duonebs and prn albuterol nebs, IV solumedrol.  Clinically feels much improved.  Off O2 since last PM.  Mild dyspnea when up to shower.  Otherwise no SOB, resolution of anterior chest tightness.  No cough.  Tolerating diet without nausea or vomiting.  No abd pain.  Last BM 3 days ago.  Voiding OK.  Presently appears upbeat.  Denies ongoing despondency presently or suicidal ideation.             Medications:   All medications reviewed today          Physical Exam:   Blood pressure 93/48, pulse 65, temperature 97.1  F (36.2  C), temperature source Oral, resp. rate 16, height 1.65 m (5' 4.96\"), weight 82.6 kg (182 lb), SpO2 96 %.    Intake/Output Summary (Last 24 hours) at 05/20/17 0858  Last data filed at 05/20/17 0845   Gross per 24 hour   Intake             1740 ml   Output             2175 ml   Net             -435 ml       General:  Alert.  Appropriate.  No distress. Off O2.     Heent:      Neck:    Skin:    Chest:  Clear - no rales, rhonchi, bronchospasm.     Cardiac:  Reg without gallop, murmur.     Abdomen:  Non distended, soft, non tender.  BS normal.    Extremities:  Perfused.  No edema, calf, thigh tenderness.     Neuro:            Data:     Results for orders placed or performed during the hospital encounter of 05/17/17 (from the past 24 hour(s))   Blood gas arterial   Result Value Ref Range    pH Arterial 7.36 7.35 - 7.45 pH    pCO2 Arterial 44 35 - 45 mm Hg    pO2 Arterial 77 (L) 80 - 105 mm Hg    Bicarbonate Arterial 25 21 - 28 mmol/L    Base Deficit Art 0.4 mmol/L    FIO2 21    XR Chest Port 1 View    Narrative    XR CHEST PORT 1 VW 5/19/2017 10:52 AM    COMPARISON: None.    HISTORY: Asthma exacerbation with new oxygen requirement, concern for  aspiration.   "    Impression    IMPRESSION: Cardiac silhouette and pulmonary vasculature are within  normal limits. No focal airspace disease, pleural effusion or  pneumothorax.    ALVERTO CRANDALL   EKG 12-lead, complete   Result Value Ref Range    Interpretation ECG Click View Image link to view waveform and result    Lactic acid level STAT   Result Value Ref Range    Lactic Acid 4.0 (HH) 0.7 - 2.1 mmol/L   Lactic acid whole blood   Result Value Ref Range    Lactic Acid 3.3 (H) 0.7 - 2.1 mmol/L   Basic metabolic panel   Result Value Ref Range    Sodium 141 133 - 144 mmol/L    Potassium 3.8 3.4 - 5.3 mmol/L    Chloride 109 94 - 109 mmol/L    Carbon Dioxide 24 20 - 32 mmol/L    Anion Gap 8 3 - 14 mmol/L    Glucose 149 (H) 70 - 99 mg/dL    Urea Nitrogen 16 7 - 30 mg/dL    Creatinine 0.73 0.52 - 1.04 mg/dL    GFR Estimate >90  Non  GFR Calc   >60 mL/min/1.7m2    GFR Estimate If Black >90   GFR Calc   >60 mL/min/1.7m2    Calcium 8.7 8.5 - 10.1 mg/dL   CBC with platelets   Result Value Ref Range    WBC 20.8 (H) 4.0 - 11.0 10e9/L    RBC Count 4.48 3.8 - 5.2 10e12/L    Hemoglobin 13.3 11.7 - 15.7 g/dL    Hematocrit 40.4 35.0 - 47.0 %    MCV 90 78 - 100 fl    MCH 29.7 26.5 - 33.0 pg    MCHC 32.9 31.5 - 36.5 g/dL    RDW 12.3 10.0 - 15.0 %    Platelet Count 241 150 - 450 10e9/L   Lactic acid whole blood   Result Value Ref Range    Lactic Acid 3.0 (H) 0.7 - 2.1 mmol/L                  Assessment and Plan:   1. Asthma exacerbation 2nd to aspiration during ECT induction.  Clinically improved.  Risk for chemical pneumonitis - not evident clinically.  CXR 5/19 neg.    2. Major depressive disorder, refractory to outpatient management. Aborted ECT.  Presently denies despondency.    3. Generalized anxiety disorder.  Compensated.    4. History of nephrolithiasis, quiescent.   5. Elevated lactic acid likely related to administered SQ epi and albuterol.  Leukocytosis c/w steroid effect. Clinically not septic.     PLAN:  1)   DC IV steroids.  Transition to po prednisone 40 mg this PM and then daily starting 5/21.  Continue nebs.  Mobilize.  DC telemetry.  2)  Continue 1:1 sitter pending follow up by psychiatry.  3)  Trend labs.  4)   Decision on dispo - back to psychiatry or home 5/21.             Attestation:  I have reviewed today's vital signs, notes, medications, labs and imaging.     Camilo Ross MD

## 2017-05-21 ENCOUNTER — APPOINTMENT (OUTPATIENT)
Dept: GENERAL RADIOLOGY | Facility: CLINIC | Age: 32
DRG: 885 | End: 2017-05-21
Attending: INTERNAL MEDICINE
Payer: COMMERCIAL

## 2017-05-21 VITALS
DIASTOLIC BLOOD PRESSURE: 59 MMHG | WEIGHT: 182 LBS | RESPIRATION RATE: 16 BRPM | OXYGEN SATURATION: 97 % | BODY MASS INDEX: 30.32 KG/M2 | HEART RATE: 52 BPM | HEIGHT: 65 IN | SYSTOLIC BLOOD PRESSURE: 102 MMHG | TEMPERATURE: 97.4 F

## 2017-05-21 LAB
ANION GAP SERPL CALCULATED.3IONS-SCNC: 5 MMOL/L (ref 3–14)
BUN SERPL-MCNC: 17 MG/DL (ref 7–30)
CALCIUM SERPL-MCNC: 8.5 MG/DL (ref 8.5–10.1)
CHLORIDE SERPL-SCNC: 110 MMOL/L (ref 94–109)
CO2 SERPL-SCNC: 28 MMOL/L (ref 20–32)
CREAT SERPL-MCNC: 0.7 MG/DL (ref 0.52–1.04)
ERYTHROCYTE [DISTWIDTH] IN BLOOD BY AUTOMATED COUNT: 12.7 % (ref 10–15)
GFR SERPL CREATININE-BSD FRML MDRD: ABNORMAL ML/MIN/1.7M2
GLUCOSE SERPL-MCNC: 103 MG/DL (ref 70–99)
HCT VFR BLD AUTO: 35.7 % (ref 35–47)
HGB BLD-MCNC: 11.7 G/DL (ref 11.7–15.7)
LACTATE BLD-SCNC: 1.6 MMOL/L (ref 0.7–2.1)
MCH RBC QN AUTO: 29.8 PG (ref 26.5–33)
MCHC RBC AUTO-ENTMCNC: 32.8 G/DL (ref 31.5–36.5)
MCV RBC AUTO: 91 FL (ref 78–100)
PLATELET # BLD AUTO: 208 10E9/L (ref 150–450)
POTASSIUM SERPL-SCNC: 4.2 MMOL/L (ref 3.4–5.3)
RBC # BLD AUTO: 3.93 10E12/L (ref 3.8–5.2)
SODIUM SERPL-SCNC: 143 MMOL/L (ref 133–144)
TROPONIN I SERPL-MCNC: NORMAL UG/L (ref 0–0.04)
TROPONIN I SERPL-MCNC: NORMAL UG/L (ref 0–0.04)
WBC # BLD AUTO: 18.5 10E9/L (ref 4–11)

## 2017-05-21 PROCEDURE — 99239 HOSP IP/OBS DSCHRG MGMT >30: CPT | Performed by: INTERNAL MEDICINE

## 2017-05-21 PROCEDURE — 83605 ASSAY OF LACTIC ACID: CPT | Performed by: INTERNAL MEDICINE

## 2017-05-21 PROCEDURE — 25000132 ZZH RX MED GY IP 250 OP 250 PS 637: Performed by: NURSE PRACTITIONER

## 2017-05-21 PROCEDURE — 94640 AIRWAY INHALATION TREATMENT: CPT

## 2017-05-21 PROCEDURE — 80048 BASIC METABOLIC PNL TOTAL CA: CPT | Performed by: INTERNAL MEDICINE

## 2017-05-21 PROCEDURE — 25000132 ZZH RX MED GY IP 250 OP 250 PS 637: Performed by: PHYSICIAN ASSISTANT

## 2017-05-21 PROCEDURE — 93010 ELECTROCARDIOGRAM REPORT: CPT | Performed by: INTERNAL MEDICINE

## 2017-05-21 PROCEDURE — 71020 XR CHEST 2 VW: CPT

## 2017-05-21 PROCEDURE — 93005 ELECTROCARDIOGRAM TRACING: CPT

## 2017-05-21 PROCEDURE — 36415 COLL VENOUS BLD VENIPUNCTURE: CPT | Performed by: INTERNAL MEDICINE

## 2017-05-21 PROCEDURE — 85027 COMPLETE CBC AUTOMATED: CPT | Performed by: INTERNAL MEDICINE

## 2017-05-21 PROCEDURE — 94640 AIRWAY INHALATION TREATMENT: CPT | Mod: 76

## 2017-05-21 PROCEDURE — 25000128 H RX IP 250 OP 636: Performed by: INTERNAL MEDICINE

## 2017-05-21 PROCEDURE — 25000132 ZZH RX MED GY IP 250 OP 250 PS 637: Performed by: INTERNAL MEDICINE

## 2017-05-21 PROCEDURE — 84484 ASSAY OF TROPONIN QUANT: CPT | Performed by: INTERNAL MEDICINE

## 2017-05-21 PROCEDURE — 25000125 ZZHC RX 250: Performed by: INTERNAL MEDICINE

## 2017-05-21 PROCEDURE — 25000132 ZZH RX MED GY IP 250 OP 250 PS 637: Performed by: PSYCHIATRY & NEUROLOGY

## 2017-05-21 RX ORDER — TRAZODONE HYDROCHLORIDE 50 MG/1
50 TABLET, FILM COATED ORAL
Qty: 15 TABLET | Refills: 0 | Status: SHIPPED | OUTPATIENT
Start: 2017-05-21

## 2017-05-21 RX ORDER — NICOTINE 21 MG/24HR
1 PATCH, TRANSDERMAL 24 HOURS TRANSDERMAL DAILY
Qty: 14 PATCH | Refills: 0 | Status: SHIPPED | OUTPATIENT
Start: 2017-05-21

## 2017-05-21 RX ORDER — POLYETHYLENE GLYCOL 3350 17 G/17G
17 POWDER, FOR SOLUTION ORAL DAILY PRN
Qty: 7 PACKET | Refills: 0 | Status: ON HOLD | OUTPATIENT
Start: 2017-05-21 | End: 2017-09-18

## 2017-05-21 RX ORDER — HYDROXYZINE HYDROCHLORIDE 25 MG/1
25-50 TABLET, FILM COATED ORAL EVERY 4 HOURS PRN
Qty: 30 TABLET | Refills: 0 | Status: SHIPPED | OUTPATIENT
Start: 2017-05-21

## 2017-05-21 RX ORDER — VENLAFAXINE HYDROCHLORIDE 150 MG/1
150 TABLET, EXTENDED RELEASE ORAL
Status: DISCONTINUED | OUTPATIENT
Start: 2017-05-21 | End: 2017-05-21 | Stop reason: HOSPADM

## 2017-05-21 RX ORDER — PREDNISONE 20 MG/1
40 TABLET ORAL DAILY
Qty: 6 TABLET | Refills: 0 | Status: SHIPPED | OUTPATIENT
Start: 2017-05-22 | End: 2017-05-25

## 2017-05-21 RX ORDER — VENLAFAXINE HYDROCHLORIDE 150 MG/1
150 TABLET, EXTENDED RELEASE ORAL
Qty: 30 EACH | Refills: 0 | Status: ON HOLD | OUTPATIENT
Start: 2017-05-21 | End: 2017-07-27

## 2017-05-21 RX ORDER — ALBUTEROL SULFATE 90 UG/1
2 AEROSOL, METERED RESPIRATORY (INHALATION) EVERY 4 HOURS PRN
COMMUNITY
Start: 2017-05-21

## 2017-05-21 RX ORDER — PANTOPRAZOLE SODIUM 40 MG/1
40 TABLET, DELAYED RELEASE ORAL
Qty: 15 TABLET | Refills: 0 | Status: SHIPPED | OUTPATIENT
Start: 2017-05-21

## 2017-05-21 RX ORDER — AMOXICILLIN 250 MG
2 CAPSULE ORAL 2 TIMES DAILY PRN
Qty: 20 TABLET | Refills: 0 | Status: SHIPPED | OUTPATIENT
Start: 2017-05-21

## 2017-05-21 RX ORDER — PANTOPRAZOLE SODIUM 40 MG/1
40 TABLET, DELAYED RELEASE ORAL
Status: DISCONTINUED | OUTPATIENT
Start: 2017-05-21 | End: 2017-05-21 | Stop reason: HOSPADM

## 2017-05-21 RX ORDER — IPRATROPIUM BROMIDE AND ALBUTEROL SULFATE 2.5; .5 MG/3ML; MG/3ML
3 SOLUTION RESPIRATORY (INHALATION) EVERY 4 HOURS PRN
Qty: 360 ML | COMMUNITY
Start: 2017-05-21

## 2017-05-21 RX ADMIN — IPRATROPIUM BROMIDE AND ALBUTEROL SULFATE 3 ML: .5; 3 SOLUTION RESPIRATORY (INHALATION) at 08:14

## 2017-05-21 RX ADMIN — HYDROXYZINE HYDROCHLORIDE 50 MG: 25 TABLET ORAL at 11:12

## 2017-05-21 RX ADMIN — ACETAMINOPHEN 650 MG: 325 TABLET, FILM COATED ORAL at 06:27

## 2017-05-21 RX ADMIN — CLONAZEPAM 0.5 MG: 0.5 TABLET ORAL at 08:37

## 2017-05-21 RX ADMIN — ALUMINUM HYDROXIDE, MAGNESIUM HYDROXIDE, AND DIMETHICONE 30 ML: 400; 400; 40 SUSPENSION ORAL at 06:27

## 2017-05-21 RX ADMIN — SODIUM CHLORIDE: 9 INJECTION, SOLUTION INTRAVENOUS at 00:06

## 2017-05-21 RX ADMIN — FLUTICASONE FUROATE AND VILANTEROL TRIFENATATE 1 PUFF: 200; 25 POWDER RESPIRATORY (INHALATION) at 08:36

## 2017-05-21 RX ADMIN — PREDNISONE 40 MG: 20 TABLET ORAL at 08:37

## 2017-05-21 RX ADMIN — FLUTICASONE PROPIONATE 2 SPRAY: 50 SPRAY, METERED NASAL at 08:36

## 2017-05-21 RX ADMIN — VENLAFAXINE HYDROCHLORIDE 150 MG: 150 TABLET, EXTENDED RELEASE ORAL at 08:37

## 2017-05-21 RX ADMIN — CHOLECALCIFEROL CAP 125 MCG (5000 UNIT) 5000 UNITS: 125 CAP at 08:37

## 2017-05-21 NOTE — PROGRESS NOTES
"[facility] Internal Medicine Progress Note            Interval History:   Record reviewed including events from early this morning.  Awakened approx 4:30 AM with sharp left parasternal chest discomfort radiating into back, aggravated by deep inspiratory effort.  Relief in approx 1 hrs after mylanta.  Substernal discomfort last PM attributed to acid reflux (just went to sleep without intervention).  No associated dyspnea, aggravated by NP cough, better with upright posture.  No relation to exertion.  Resolved by rounds approx 8:30.  EKG - minor NS T wave abnormality anteriorly.  Troponin neg.  Tolerating diet.  No nausea, abd pain.  BM 4 days ago.  Declines supp.  No voiding c/o's.  PEFR 210 (baseline per pt).  Down to prednisone 40 mg daily starting this AM.  Continues to deny feelings of depression or suicidal ideation.  Psychiatry consult reviewed, discussed.  [place] to be safe for DC with family with out pt psychiatry follow up.  Increase effexor 150 mg daily.           Medications:   All medications reviewed today          Physical Exam:   Blood pressure 102/59, pulse 52, temperature 97.4  F (36.3  C), temperature source Oral, resp. rate 16, height 1.65 m (5' 4.96\"), weight 82.6 kg (182 lb), SpO2 97 %.    Intake/Output Summary (Last 24 hours) at 05/20/17 0858  Last data filed at 05/20/17 0845   Gross per 24 hour   Intake             1740 ml   Output             2175 ml   Net             -435 ml   I/O this shift:  In: -   Out: 800 [Urine:800]      General:  Alert.  Appropriate.  No distress.  Resp normal. Off O2.     Heent:      Neck:    Skin:    Chest:  Clear - no rales, rhonchi, bronchospasm.     Cardiac:  Reg without gallop, murmur.  Tender left parasternal area with palpation consistently reproducing the above noted discomfort.     Abdomen:  Non distended, soft, non tender.  BS normal.    Extremities:  Perfused.  No edema, calf, thigh tenderness.     Neuro:            Data:     Results for orders placed " or performed during the hospital encounter of 05/17/17 (from the past 24 hour(s))   Basic metabolic panel   Result Value Ref Range    Sodium 143 133 - 144 mmol/L    Potassium 4.2 3.4 - 5.3 mmol/L    Chloride 110 (H) 94 - 109 mmol/L    Carbon Dioxide 28 20 - 32 mmol/L    Anion Gap 5 3 - 14 mmol/L    Glucose 103 (H) 70 - 99 mg/dL    Urea Nitrogen 17 7 - 30 mg/dL    Creatinine 0.70 0.52 - 1.04 mg/dL    GFR Estimate >90  Non  GFR Calc   >60 mL/min/1.7m2    GFR Estimate If Black >90   GFR Calc   >60 mL/min/1.7m2    Calcium 8.5 8.5 - 10.1 mg/dL   CBC with platelets   Result Value Ref Range    WBC 18.5 (H) 4.0 - 11.0 10e9/L    RBC Count 3.93 3.8 - 5.2 10e12/L    Hemoglobin 11.7 11.7 - 15.7 g/dL    Hematocrit 35.7 35.0 - 47.0 %    MCV 91 78 - 100 fl    MCH 29.8 26.5 - 33.0 pg    MCHC 32.8 31.5 - 36.5 g/dL    RDW 12.7 10.0 - 15.0 %    Platelet Count 208 150 - 450 10e9/L   Lactic acid whole blood   Result Value Ref Range    Lactic Acid 1.6 0.7 - 2.1 mmol/L   EKG 12-lead, complete   Result Value Ref Range    Interpretation ECG Click View Image link to view waveform and result    Troponin I   Result Value Ref Range    Troponin I ES  0.000 - 0.045 ug/L     <0.015  The 99th percentile for upper reference range is 0.045 ug/L.  Troponin values in   the range of 0.045 - 0.120 ug/L may be associated with risks of adverse   clinical events.     Troponin I   Result Value Ref Range    Troponin I ES  0.000 - 0.045 ug/L     <0.015  The 99th percentile for upper reference range is 0.045 ug/L.  Troponin values in   the range of 0.045 - 0.120 ug/L may be associated with risks of adverse   clinical events.     XR Chest 2 Views    Narrative    CHEST TWO VIEWS   5/21/2017 10:13 AM    HISTORY:  Left chest pain and cough.    COMPARISON:  5/19/2017.    FINDINGS:  The heart size is normal. No mediastinal pathology is seen.  The lungs are clear. The pulmonary vasculature is normal. No  pneumothorax or pleural effusion  "is seen.       Impression    IMPRESSION:  Unremarkable chest. I see no definite change since the  previous examination.     ALISON LAGUNA MD                  Assessment and Plan:   1. Asthma exacerbation 2nd to aspiration during ECT induction.  Clinically resolved.   No evidence for chemical pneumonitis.   Neg f/u CXR.    2. Major depressive disorder with aborted attempt at ECT.  Resolution of despondent feelings potentially contributed to by steroid related \"high\".  OK for DC per psychiatry with close out pt follow up.   3. Generalized anxiety disorder.  Compensated.  PRN vistaril helpful.   4. Left parasternal chest discomfort not consistent with cardiac ischemia.  Localized tenderness reproducing discomfort c/w chest wall component.  Potential strain with initial episode of emesis, sevre cough.  Cannot exclude gerd, esophageal spasm aggravated by steroids.  Symptoms atypical for PE based on history and exam, adequate sats, not tachycardic.    5. Elevated lactic acid likely related to administered SQ epi and albuterol (normalized).  Leukocytosis c/w steroid effect (improved).     PLAN:  1)  Clinically stable for DC.  2)  Meds/orders reviewed.  Effexor  mg daily, Prednisone 40 mg daily X3 days then DC, duonebs prn. Protonix x 2 weeks.   PMD appt this week with psychiatry referral.  Call MD or go to ER if recurrent CP, dyspnea, feeling of despondency.          Attestation:  I have reviewed today's vital signs, notes, medications, labs and imaging.     Camilo Ross MD          "

## 2017-05-21 NOTE — PROGRESS NOTES
Called by nurse regarding a patient complaint of chest pain. Pt is a 30 yo F who was admitted after an aspiration causing asthma exacerbation. This occurred in the setting of sedation for ECT for depression. This morning she woke up with mid chest pain at 6/10 which is sharp and fairly constant. ECG shows sinus bradycardia. There are no ischemic changes except for some T wave inversion in V3 that is more prominent than previously.     I think this is most likely related to pneumonitis and pleuritis or possibly esophageal pain. She is bradycardic which is atypical of PE or ACS, however with the slight T wave changes, I will order some troponins. PE seems less likely with the bradycardia, normal respiratory status, and normal blood pressure. I will not get a D-dimer at this point, but could be considered later. Both of these, of course, would be very unusual in a 31 year old woman, and she has other reasons for potential chest pain, especially pleuritis. We will try some acetaminophen and give this a little time as the troponin is pending.

## 2017-05-21 NOTE — PROVIDER NOTIFICATION
Pt c/o midsternal chest pain, describes it as lung pain.  VSS, no numbness, tingling, or nausea, though says she has jaw pain.  Lungs CTA. Text page sent to provider.  Will continue to monitor pt.

## 2017-05-21 NOTE — PLAN OF CARE
Problem: Goal Outcome Summary  Goal: Goal Outcome Summary   Observation goals  Comments: List all goals to be met before discharge home:   1. Improvement of Peak Flow to greater than 70% sustained off nebulizer for 4 hours.   2. Dyspnea improved and oxygen saturations greater than 88% on room air or prior home oxygen levels   3. Vitals signs normal or at patient baseline   4.Safe disposition plan has been identified   5.Nurse to notify provider when observation goals have been met and patient is ready for discharge.     Outcome: No Change  Problem: Goal Outcome Summary  Goal: Goal Outcome Summary   Observation goals  Comments: List all goals to be met before discharge home:   1. Improvement of Peak Flow to greater than 70% sustained off nebulizer for 4 hours.   2. Dyspnea improved and oxygen saturations greater than 88% on room air or prior home oxygen levels   3. Vitals signs normal or at patient baseline   4.Safe disposition plan has been identified   5.Nurse to notify provider when observation goals have been met and patient is ready for discharge.      Patient slept peacefully most of the night, lungs CTA, bowel sounds active-passing gas. Pt woke around 0600 c/o chest pain/lung pain.  Assessment showed VSS.  Moonlighter notified and EKG ordered.  EKG normal.  Moonlighter suggested tylenol and Mylanta and continued monitoring. No other numbness, tingling, weakness or SOB per pt report. Fluids promoted, IV fluids infusing at 75ml/hr. Pt independent in room, uses call light appropriately and is able to make needs known.  Will continue to monitor.           2300  1. Improvement of Peak Flow to greater than 70% sustained off nebulizer for 4 hours. RT TO ASSESS  2. Dyspnea improved and oxygen saturations greater than 88% on room air or prior home oxygen levels: Pt is on RA, sats above 90% GOAL MET  3. Vitals signs normal or at patient baseline : VSS- GOAL MET  4.Safe disposition plan has been identified : GOAL  MET  5.Nurse to notify provider when observation goals have been met and patient is ready for discharge.        0100  1. Improvement of Peak Flow to greater than 70% sustained off nebulizer for 4 hours. RT TO ASSESS  2. Dyspnea improved and oxygen saturations greater than 88% on room air or prior home oxygen levels: Pt is on RA, sats above 90% GOAL MET  3. Vitals signs normal or at patient baseline : VSS- GOAL MET  4.Safe disposition plan has been identified : GOAL MET  5.Nurse to notify provider when observation goals have been met and patient is ready for discharge.        0300  1. Improvement of Peak Flow to greater than 70% sustained off nebulizer for 4 hours. RT TO ASSESS  2. Dyspnea improved and oxygen saturations greater than 88% on room air or prior home oxygen levels: Pt is on RA, sats above 90% GOAL MET  3. Vitals signs normal or at patient baseline : VSS- GOAL MET  4.Safe disposition plan has been identified : GOAL MET  5.Nurse to notify provider when observation goals have been met and patient is ready for discharge.        0500  1. Improvement of Peak Flow to greater than 70% sustained off nebulizer for 4 hours. RT TO ASSESS  2. Dyspnea improved and oxygen saturations greater than 88% on room air or prior home oxygen levels: Pt is on RA, sats above 90% GOAL MET  3. Vitals signs normal or at patient baseline : VSS- GOAL MET  4.Safe disposition plan has been identified : GOAL MET  5.Nurse to notify provider when observation goals have been met and patient is ready for discharge.

## 2017-05-21 NOTE — PLAN OF CARE
Problem: Goal Outcome Summary  Goal: Goal Outcome Summary   Observation goals  Comments: List all goals to be met before discharge home:   1. Improvement of Peak Flow to greater than 70% sustained off nebulizer for 4 hours.   2. Dyspnea improved and oxygen saturations greater than 88% on room air or prior home oxygen levels   3. Vitals signs normal or at patient baseline   4.Safe disposition plan has been identified   5.Nurse to notify provider when observation goals have been met and patient is ready for discharge.     Outcome: No Change  Problem: Goal Outcome Summary  Goal: Goal Outcome Summary   Observation goals  Comments: List all goals to be met before discharge home:   1. Improvement of Peak Flow to greater than 70% sustained off nebulizer for 4 hours.   2. Dyspnea improved and oxygen saturations greater than 88% on room air or prior home oxygen levels   3. Vitals signs normal or at patient baseline   4.Safe disposition plan has been identified   5.Nurse to notify provider when observation goals have been met and patient is ready for discharge.         Patient A&O, lungs CTA, bowel sounds active-passing gas. Pt taking tylenol for pain. No numbness, tingling, weakness or SOB per pt report. Fluids promoted, IV fluids infusing at 75ml/hr.  Pt independent in room, uses call light appropriately and is able to make needs known.  Will continue to monitor.        1500  1. Improvement of Peak Flow to greater than 70% sustained off nebulizer for 4 hours.  RT TO ASSESS  2. Dyspnea improved and oxygen saturations greater than 88% on room air or prior home oxygen levels: Pt is on RA, sats above 90% GOAL MET  3. Vitals signs normal or at patient baseline : VSS- GOAL MET  4.Safe disposition plan has been identified : GOAL MET  5.Nurse to notify provider when observation goals have been met and patient is ready for discharge.        1700  1. Improvement of Peak Flow to greater than 70% sustained off nebulizer for 4 hours.   RT TO ASSESS  2. Dyspnea improved and oxygen saturations greater than 88% on room air or prior home oxygen levels: Pt is on RA, sats above 90% GOAL MET  3. Vitals signs normal or at patient baseline : VSS- GOAL MET  4.Safe disposition plan has been identified : GOAL MET  5.Nurse to notify provider when observation goals have been met and patient is ready for discharge.     1900  1. Improvement of Peak Flow to greater than 70% sustained off nebulizer for 4 hours. RT TO ASSESS  2. Dyspnea improved and oxygen saturations greater than 88% on room air or prior home oxygen levels: Pt is on RA, sats above 90% GOAL MET  3. Vitals signs normal or at patient baseline : VSS- GOAL MET  4.Safe disposition plan has been identified : GOAL MET  5.Nurse to notify provider when observation goals have been met and patient is ready for discharge.     2100  1. Improvement of Peak Flow to greater than 70% sustained off nebulizer for 4 hours. RT TO ASSESS  2. Dyspnea improved and oxygen saturations greater than 88% on room air or prior home oxygen levels: Pt is on RA, sats above 90% GOAL MET  3. Vitals signs normal or at patient baseline : VSS- GOAL MET  4.Safe disposition plan has been identified : GOAL MET  5.Nurse to notify provider when observation goals have been met and patient is ready for discharge.

## 2017-05-21 NOTE — DISCHARGE SUMMARY
Internal Medicine Discharge Summary   Date of Service: 5/21/2017    Nuha Lees MRN# [id number]   YOB: 1985 Age: 31 year old     Date of Admission:  5/17/2017  Date of Discharge:  5/21/2017   Admitting Provider:  Camilo Ross MD  Discharge Provider:  Camilo Ross MD  Discharging Service:  Internal Medicine     Primary Provider: Anabel Lord MD         Reason for Admission:   Asthma exacerbation 2nd to aspiration at time of induction for ECT.  31-year-old female with history of major depressive disorder, recurrent, severe, and what she indicates to be poorly controlled asthma, initially admitted to inpatient psychiatry (station 30) on 05/18 in anticipation of ECT intervention for depression which has been refractory to medical management. Cleared by Internal Medicine consultation on 05/18. Exam demonstrated clear lung fields at that time. Contacted by Anesthesia earlier today with indication that early during induction for ECT, the patient noted to have bilious material coming out of her mouth in support of aspiration. Case aborted. Noted to have severe bronchospasm. Acute intervention with albuterol by nebulization x2, 100 mg of IV Solu-Medrol and subcu epinephrine x2. Initially required 6-8 liters of oxygen to maintain O2 sat which had dropped into the low 80s. Chest x-ray demonstrated no infiltrate. Normal EKG. Arterial blood gas pH 7.36, pCO2 of 44, pO2 of 77, bicarbonate 25, FIO2 of 21%. The patient transferred to the PACU for closer clinical monitoring with subsequent admission to medical telemetry.  Clinically improved at time of transfer breathing comfortably with resolution of bronchospasm. Adequate O2 sats on 1 liter by nasal cannula at 94%.           Discharge Diagnoses:   1. Asthma exacerbation 2nd to aspiration during ECT induction. Clinically resolved.   2. Major depressive disorder, in early remission.  3. Generalized anxiety disorder. Compensated.    4. Left  "parasternal chest discomfort not consistent with cardiac ischemia. Possible chest wall, possible GERD.   5. Elevated lactic acid 2nd to Beta adrenergics.  Normalized         Procedures & Significant Findings:   CXR Port 1 view 5/19 - Cardiac silhouette and pulmonary vasculature are within  normal limits. No focal airspace disease, pleural effusion or  Pneumothorax.  CXR PA&Lat 5/21 - Unremarkable chest. I see no definite change since the  previous examination.   EKG 5/21 - SB.  NS T wave abnormality.         Consultations:   Camilo Juan MD, Psychiatry 5/20/17         Hospital Course by Problem:            1. Asthma exacerbation 2nd to aspiration during ECT induction.   Acute intervention by anesthesia as above.  Transfer to medical unit where continued over night on IV steroids, scheduled and prn nebs, singulair and Breo ellipta.  Transitioned to oral prednisone AM 5/20.  Continued clinical monitoring.  By AM 5/21 pt remained well compensated.  Off O2 as of 5/20.  Sat 97% RA. Mild dyspnea with shower 5/20 otherwise tolerating activity on the unit without compromise.  No cough.  CP AM 5/21 as indicated below.  Resolved.  CXR follow up NAD.  PEFR 210 (baseline).  Clinically stable for DC.  Planned prednisone 40 mg daily additional 3 days with continued breo-ellipta, singulair and duonebs prn.   2. Major depressive disorder, refractory to out patient management.   Aborted attempt at ECT, as above.  Psychiatry consult Dr. Camilo Juan 5/20.   Resolution of despondent feelings potentially contributed to by steroid related \"high\". Impression of MDD, in early remission.  Recommendation on increase effexor to 150 mg XR daily.   Felt to be safe for DC per psychiatry with close out pt follow up.   3. Generalized anxiety disorder. Compensated. PRN vistaril helpful.   4. Left parasternal chest discomfort. Awakened approx 4:30 AM 5/21 with sharp left parasternal chest discomfort radiating into back, aggravated by deep " "inspiratory effort. Relief in approx 1 hr after mylanta. Substernal discomfort night before  attributed to acid reflux.   No associated dyspnea.   Pain aggravated by NP cough, better with upright posture. No relation to exertion. Resolved by rounds approx 8:30. EKG - minor NS T wave abnormality anteriorly. Troponin neg. not consistent with cardiac ischemia. Localized parasternal tenderness reproducing discomfort c/w chest wall component. Potential strain with initial episode of emesis, severe cough at time of ECT induction.. Potential component of gerd with esophageal spasm aggravated by steroids. Symptoms felt to be non cardiac and atypical for PE based on history and exam, adequate sats, not tachycardic.  CXR follow iup no infiltrate. Placed on empiric protonix.    5. Elevated lactic acid joanne admit felt ikely related to administered SQ epi and albuterol (normalized). Leukocytosis c/w steroid effect (improved).       Physical Exam on day of discharge:  Blood pressure 102/59, pulse 52, temperature 97.4  F (36.3  C), temperature source Oral, resp. rate 16, height 1.65 m (5' 4.96\"), weight 82.6 kg (182 lb), SpO2 97 %.  GENERAL: Alert and orientated x 3.  No distress.  Off O2.    HEENT:  Unremarkable.   NECK:  No nodes.  CV: reg without gallop, murmur.  RESPIRATORY: Chest - clear. No bronchospasm.  GI: Abdomen non distended, soft, non tender.  BS normal.   EXTREMITIES: Well perfused.  No edema. No calf, posterior thigh tenderness.    SKIN: No rash.     Lines/Tubes/Drains:   Peripheral IV 10/05/11 Upper arm (Active)   Number of days:2055       Peripheral IV 05/19/17 Right;Dorsal Hand (Active)   Site Assessment WDL 5/21/2017 12:26 AM   Line Status Infusing 5/21/2017 12:26 AM   Phlebitis Scale 0-->no symptoms 5/21/2017 12:26 AM   Infiltration Scale 0 5/21/2017 12:26 AM   Number of days:2       Peripheral IV 05/20/17 Right Lower forearm (Active)   Number of days:1              Pending Results:     Unresulted Labs Ordered " in the Past 30 Days of this Admission     No orders found from 3/18/2017 to 5/18/2017.      Last Basic Metabolic Panel:  Lab Results   Component Value Date     05/21/2017      Lab Results   Component Value Date    POTASSIUM 4.2 05/21/2017     Lab Results   Component Value Date    CHLORIDE 110 05/21/2017     Lab Results   Component Value Date    NICHOLAS 8.5 05/21/2017     Lab Results   Component Value Date    CO2 28 05/21/2017     Lab Results   Component Value Date    BUN 17 05/21/2017     Lab Results   Component Value Date    CR 0.70 05/21/2017     Lab Results   Component Value Date     05/21/2017     Lab Results   Component Value Date    WBC 18.5 05/21/2017     Lab Results   Component Value Date    RBC 3.93 05/21/2017     Lab Results   Component Value Date    HGB 11.7 05/21/2017     Lab Results   Component Value Date    HCT 35.7 05/21/2017     Lab Results   Component Value Date    MCV 91 05/21/2017     Lab Results   Component Value Date    MCH 29.8 05/21/2017     Lab Results   Component Value Date    MCHC 32.8 05/21/2017     Lab Results   Component Value Date    RDW 12.7 05/21/2017     Lab Results   Component Value Date     05/21/2017              Discharge Medications:     Current Discharge Medication List      START taking these medications    Details   fluticasone-vilanterol (BREO ELLIPTA) 200-25 MCG/INH oral inhaler Inhale 1 puff into the lungs daily  Qty: 1 Inhaler, Refills: 0    Associated Diagnoses: Asthma exacerbation      ipratropium - albuterol 0.5 mg/2.5 mg/3 mL (DUONEB) 0.5-2.5 (3) MG/3ML neb solution Take 1 vial (3 mLs) by nebulization every 4 hours as needed for shortness of breath / dyspnea or wheezing  Qty: 360 mL      traZODone (DESYREL) 50 MG tablet Take 1 tablet (50 mg) by mouth nightly as needed for sleep  Qty: 15 tablet, Refills: 0    Associated Diagnoses: Severe recurrent major depression without psychotic features (H)      venlafaxine (EFFEXOR-ER) 150 MG TB24 24 hr tablet Take  1 tablet (150 mg) by mouth daily (with breakfast)  Qty: 30 each, Refills: 0    Associated Diagnoses: Severe recurrent major depression without psychotic features (H)      predniSONE (DELTASONE) 20 MG tablet Take 2 tablets (40 mg) by mouth daily for 3 days  Qty: 6 tablet, Refills: 0    Associated Diagnoses: Asthma exacerbation      polyethylene glycol (MIRALAX/GLYCOLAX) Packet Take 17 g by mouth daily as needed for constipation  Qty: 7 packet, Refills: 0    Associated Diagnoses: Slow transit constipation      senna-docusate (SENOKOT-S;PERICOLACE) 8.6-50 MG per tablet Take 2 tablets by mouth 2 times daily as needed (constipation )  Qty: 20 tablet, Refills: 0    Associated Diagnoses: Slow transit constipation      pantoprazole (PROTONIX) 40 MG EC tablet Take 1 tablet (40 mg) by mouth every morning (before breakfast)  Qty: 15 tablet, Refills: 0    Associated Diagnoses: Gastroesophageal reflux disease without esophagitis      nicotine (NICODERM CQ) 14 MG/24HR 24 hr patch Place 1 patch onto the skin daily  Qty: 14 patch, Refills: 0    Associated Diagnoses: Asthma exacerbation         CONTINUE these medications which have CHANGED    Details   hydrOXYzine (ATARAX) 25 MG tablet Take 1-2 tablets (25-50 mg) by mouth every 4 hours as needed for anxiety  Qty: 30 tablet, Refills: 0    Associated Diagnoses: Anxiety      albuterol (PROAIR HFA/PROVENTIL HFA/VENTOLIN HFA) 108 (90 BASE) MCG/ACT Inhaler Inhale 2 puffs into the lungs every 4 hours as needed for shortness of breath / dyspnea or wheezing         CONTINUE these medications which have NOT CHANGED    Details   fluticasone (VERAMYST) 27.5 MCG/SPRAY spray Spray 2 sprays into both nostrils daily      montelukast (SINGULAIR) 10 MG tablet Take 10 mg by mouth At Bedtime      VITAMIN D, CHOLECALCIFEROL, PO Take 5,000 Units by mouth daily      CLONAZEPAM PO Take 0.5 mg by mouth 3 times daily         STOP taking these medications       FLUoxetine HCl (PROZAC PO) Comments:   Reason  for Stopping:         ARIPIPRAZOLE PO Comments:   Reason for Stopping:                    Discharge Instructions and Follow-Up:     Discharge Procedure Orders  Discharge Instructions   Order Comments: No smoking.  Prednisone daily for 3 days starting 5/22  Nebs every 4 hrs as needed  Suppository if no BM in next 2 days  Appointment this week with primary MD - with psychiatry follow up.  Call MD or go to ER if breathing worse, recurrent chest pain or increase despondency.                 Discharge Disposition:   As above.           Condition on Discharge:   Discharge condition: Good   Code status on discharge: Full Code        40 minutes spent in discharge, including >50% in counseling and coordination of care, medication review and plan of care recommended on follow up.     Discharge summary was forwarded to Anabel Lord MD (PCP) at the time of discharge, so as to bridge from hospital to outpatient care.     It was our pleasure to care for Nuha Lees during this hospitalization. Please do not hesitate to contact me should there be questions regarding the hospital course or discharge plan.      Camilo Ross MD  Hospitalist Service  Pager: [phone]

## 2017-05-21 NOTE — PLAN OF CARE
Problem: Goal Outcome Summary  Goal: Goal Outcome Summary   Observation goals  Comments: List all goals to be met before discharge home:   1. Improvement of Peak Flow to greater than 70% sustained off nebulizer for 4 hours.   2. Dyspnea improved and oxygen saturations greater than 88% on room air or prior home oxygen levels   3. Vitals signs normal or at patient baseline   4.Safe disposition plan has been identified   5.Nurse to notify provider when observation goals have been met and patient is ready for discharge.     Outcome: Adequate for Discharge Date Met:  05/21/17  Pt up independently. Denies pain. Chest pain from last night resolved. Tolerating regular diet. PIV removed. C/o sore throat but no difficulty swallowing. Pt able to make needs known. Ready to discharge home. Pt going home with . Writer reviewed all medications and paperwork with pt. Pt given d/c meds.

## 2017-05-22 LAB — INTERPRETATION ECG - MUSE: NORMAL

## 2017-05-23 LAB — INTERPRETATION ECG - MUSE: NORMAL

## 2017-07-13 ENCOUNTER — HOSPITAL ENCOUNTER (INPATIENT)
Facility: CLINIC | Age: 32
LOS: 15 days | Discharge: HOME | DRG: 885 | End: 2017-07-28
Attending: PSYCHIATRY & NEUROLOGY | Admitting: PSYCHIATRY & NEUROLOGY
Payer: COMMERCIAL

## 2017-07-13 DIAGNOSIS — F33.2 SEVERE RECURRENT MAJOR DEPRESSION WITHOUT PSYCHOTIC FEATURES (H): ICD-10-CM

## 2017-07-13 PROBLEM — F32.A DEPRESSION: Status: ACTIVE | Noted: 2017-07-13

## 2017-07-13 PROCEDURE — 25000132 ZZH RX MED GY IP 250 OP 250 PS 637: Performed by: PSYCHIATRY & NEUROLOGY

## 2017-07-13 PROCEDURE — 93005 ELECTROCARDIOGRAM TRACING: CPT

## 2017-07-13 PROCEDURE — 12400007 ZZH R&B MH INTERMEDIATE UMMC

## 2017-07-13 RX ORDER — VENLAFAXINE HYDROCHLORIDE 150 MG/1
150 TABLET, EXTENDED RELEASE ORAL
Status: COMPLETED | OUTPATIENT
Start: 2017-07-14 | End: 2017-07-16

## 2017-07-13 RX ORDER — MONTELUKAST SODIUM 10 MG/1
10 TABLET ORAL AT BEDTIME
Status: DISCONTINUED | OUTPATIENT
Start: 2017-07-13 | End: 2017-07-28 | Stop reason: HOSPADM

## 2017-07-13 RX ORDER — VENLAFAXINE HYDROCHLORIDE 37.5 MG/1
37.5 TABLET, EXTENDED RELEASE ORAL
Status: DISCONTINUED | OUTPATIENT
Start: 2017-07-23 | End: 2017-07-20

## 2017-07-13 RX ORDER — HYDROXYZINE HYDROCHLORIDE 25 MG/1
25-50 TABLET, FILM COATED ORAL EVERY 4 HOURS PRN
Status: DISCONTINUED | OUTPATIENT
Start: 2017-07-13 | End: 2017-07-13

## 2017-07-13 RX ORDER — ALBUTEROL SULFATE 90 UG/1
2 AEROSOL, METERED RESPIRATORY (INHALATION) EVERY 4 HOURS PRN
Status: DISCONTINUED | OUTPATIENT
Start: 2017-07-13 | End: 2017-07-28 | Stop reason: HOSPADM

## 2017-07-13 RX ORDER — ACETAMINOPHEN 325 MG/1
650 TABLET ORAL EVERY 4 HOURS PRN
Status: DISCONTINUED | OUTPATIENT
Start: 2017-07-13 | End: 2017-07-28 | Stop reason: HOSPADM

## 2017-07-13 RX ORDER — ALUMINA, MAGNESIA, AND SIMETHICONE 2400; 2400; 240 MG/30ML; MG/30ML; MG/30ML
30 SUSPENSION ORAL EVERY 4 HOURS PRN
Status: DISCONTINUED | OUTPATIENT
Start: 2017-07-13 | End: 2017-07-28 | Stop reason: HOSPADM

## 2017-07-13 RX ORDER — NICOTINE 21 MG/24HR
1 PATCH, TRANSDERMAL 24 HOURS TRANSDERMAL DAILY
Status: DISCONTINUED | OUTPATIENT
Start: 2017-07-13 | End: 2017-07-28 | Stop reason: HOSPADM

## 2017-07-13 RX ORDER — TRAZODONE HYDROCHLORIDE 50 MG/1
50 TABLET, FILM COATED ORAL
Status: DISCONTINUED | OUTPATIENT
Start: 2017-07-13 | End: 2017-07-15

## 2017-07-13 RX ORDER — HYDROXYZINE HYDROCHLORIDE 25 MG/1
25-50 TABLET, FILM COATED ORAL EVERY 4 HOURS PRN
Status: DISCONTINUED | OUTPATIENT
Start: 2017-07-13 | End: 2017-07-28 | Stop reason: HOSPADM

## 2017-07-13 RX ORDER — PANTOPRAZOLE SODIUM 40 MG/1
40 TABLET, DELAYED RELEASE ORAL
Status: DISCONTINUED | OUTPATIENT
Start: 2017-07-14 | End: 2017-07-28 | Stop reason: HOSPADM

## 2017-07-13 RX ORDER — IPRATROPIUM BROMIDE AND ALBUTEROL SULFATE 2.5; .5 MG/3ML; MG/3ML
3 SOLUTION RESPIRATORY (INHALATION) EVERY 4 HOURS PRN
Status: DISCONTINUED | OUTPATIENT
Start: 2017-07-13 | End: 2017-07-28 | Stop reason: HOSPADM

## 2017-07-13 RX ORDER — VENLAFAXINE HYDROCHLORIDE 75 MG/1
75 TABLET, EXTENDED RELEASE ORAL
Status: COMPLETED | OUTPATIENT
Start: 2017-07-17 | End: 2017-07-23

## 2017-07-13 RX ORDER — FLUTICASONE PROPIONATE 50 MCG
2 SPRAY, SUSPENSION (ML) NASAL DAILY
Status: DISCONTINUED | OUTPATIENT
Start: 2017-07-14 | End: 2017-07-28 | Stop reason: HOSPADM

## 2017-07-13 RX ORDER — CLONAZEPAM 0.5 MG/1
0.5 TABLET ORAL 3 TIMES DAILY
Status: DISCONTINUED | OUTPATIENT
Start: 2017-07-13 | End: 2017-07-28 | Stop reason: HOSPADM

## 2017-07-13 RX ADMIN — MONTELUKAST SODIUM 10 MG: 10 TABLET, FILM COATED ORAL at 21:14

## 2017-07-13 RX ADMIN — CLONAZEPAM 0.5 MG: 0.5 TABLET ORAL at 21:14

## 2017-07-13 RX ADMIN — NICOTINE 1 PATCH: 14 PATCH, EXTENDED RELEASE TRANSDERMAL at 18:07

## 2017-07-13 RX ADMIN — CLONAZEPAM 0.5 MG: 0.5 TABLET ORAL at 18:06

## 2017-07-13 ASSESSMENT — ACTIVITIES OF DAILY LIVING (ADL)
BATHING: 0-->INDEPENDENT
DRESS: INDEPENDENT
TRANSFERRING: 0-->INDEPENDENT
TOILETING: 0-->INDEPENDENT
CURRENT_FUNCTIONAL_LEVEL_COMMENT: GOOD
PRIOR_FUNCTIONAL_LEVEL_COMMENT: GOOD
EATING: 0-->INDEPENDENT
SWALLOWING: 0-->SWALLOWS FOODS/LIQUIDS WITHOUT DIFFICULTY
TRANSFERRING: 0-->INDEPENDENT
GROOMING: INDEPENDENT
AMBULATION: 0-->INDEPENDENT
COGNITION: 0 - NO COGNITION ISSUES REPORTED
FALL_HISTORY_WITHIN_LAST_SIX_MONTHS: NO
DRESS: 0-->INDEPENDENT
AMBULATION: 0-->INDEPENDENT
COMMUNICATION: 0-->UNDERSTANDS/COMMUNICATES WITHOUT DIFFICULTY
TOILETING: 0-->INDEPENDENT
BATHING: 0-->INDEPENDENT
RETIRED_COMMUNICATION: 0-->UNDERSTANDS/COMMUNICATES WITHOUT DIFFICULTY
RETIRED_EATING: 1-->ASSISTIVE EQUIPMENT
CHANGE_IN_FUNCTIONAL_STATUS_SINCE_ONSET_OF_CURRENT_ILLNESS/INJURY: NO
SWALLOWING: 0-->SWALLOWS FOODS/LIQUIDS WITHOUT DIFFICULTY
ORAL_HYGIENE: INDEPENDENT
DRESS: 0-->INDEPENDENT

## 2017-07-13 NOTE — PROGRESS NOTES
07/13/17 1411   Patient Belongings   Did you bring any home meds/supplements to the hospital?  No   Patient Belongings clothing;other (see comments)   Disposition of Belongings locker #4   Belongings Search Yes   Clothing Search Yes   Second Staff Lali CROWELL   General Info Comment Pt kept medical cards, & license -  took purse & other belongings home.     Pt  Kept medical cards & license. Purse & other belongings went home with   ADMISSION:  I am responsible for any personal items that are not sent to the safe or pharmacy. [facility] is not responsible for loss, theft or damage of any property in my possession.    Patient Signature _____________________ Date/Time _____________________    Staff Signature _______________________ Date/Time _____________________    2nd Staff person, if patient is unable/unwilling to sign  ___________________________________ Date/Time _____________________  DISCHARGE:  All personal items have been returned to me.    Patient Signature _____________________ Date/Time _____________________    Staff Signature _______________________ Date/Time _____________________

## 2017-07-13 NOTE — IP AVS SNAPSHOT
[address]    [address]    [place] [address]    Phone:  [phone]                                       After Visit Summary   7/13/2017    Nuha Lees    MRN: [id number]           After Visit Summary Signature Page     I have received my discharge instructions, and my questions have been answered. I have discussed any challenges I see with this plan with the nurse or doctor.    ..........................................................................................................................................  Patient/Patient Representative Signature      ..........................................................................................................................................  Patient Representative Print Name and Relationship to Patient    ..................................................               ................................................  Date                                            Time    ..........................................................................................................................................  Reviewed by Signature/Title    ...................................................              ..............................................  Date                                                            Time

## 2017-07-13 NOTE — IP AVS SNAPSHOT
MRN:[id number]                      After Visit Summary   7/13/2017    Nuha Lees    MRN: [id number]           Thank you!     Thank you for choosing [facility] for your care. Our goal is always to provide you with excellent care.        Patient Information     Date Of Birth          1985        Designated Caregiver       Most Recent Value    Caregiver    Will someone help with your care after discharge? yes    Name of designated caregiver Sohan     Phone number of caregiver [phone]    Caregiver address home address      About your hospital stay     You were admitted on:  July 13, 2017 You last received care in the:  [facility] 32NR    You were discharged on:  July 28, 2017       Who to Call     For medical emergencies, please call 911.  For non-urgent questions about your medical care, please call your primary care provider or clinic, [phone]  For questions related to your surgery, please call your surgery clinic        Attending Provider     Provider Heber Ortega MD Psychiatry       Primary Care Provider Office Phone # Fax #    Anabel Lord MD, [phone]      Further instructions from your care team       Behavioral Discharge Planning and Instructions    Summary: You were admitted due to increased depression and suicidal ideation. You were admitted for ECT. You are being discharged home.    ECT Discharge Instructions      During your ECT series:      Do not drive or work heavy equipment until 7 days after your last treatment.    Do not drink alcohol or use street drugs (illicit drugs) while you are having treatments.    Do not make important decisions, including legal decisions.    After each treatment:      Get plenty of rest. A responsible adult must stay with your for at least 6 hours.    Avoid heavy or risky activities for 24 hours.    If you feel light-headed, sit for a few minutes before standing. Have someone help you get up.    If you  have nausea (feel sick to your stomach): Drink only clear liquids such as apple juice, ginger ale, broth or 7UP, Be sure to drink plenty of liquids. Move to a normal diet as you feel able.     If you received Toradol, wait 6 hours before taking ibuprofen.    Call your doctor if:     You have a fever over 100F (37.7 C) (taken under the tongue), or a fever that last more than 24 hours.    Your IV site is red, swollen, very painful or is getting more tender.    You have nausea that gets very bad or does not improve.      If you have any symptoms after ECT, tell our staff before your next treatment.    The ECT Department can be reached at [phone].  The ECT Department is open Mondays, Wednesdays and Fridays from 7:00 AM to 2:00 PM.      Other: You received Toradol this morning during ECT. You may resume any NSAIDS as needed for pain after 1 pm today.    Transported by:        Main Diagnosis: Major depression    Major Treatments, Procedures and Findings: You met with Dr. Lemus for psychiatric assessment, ECT and medication management. Direct care was provided by unit nurses and staff. You met with case management. You had opportunities to participate in therapeutic groups on the unit. At this time you report your mood has stabilized and you report you are not having thoughts or intent to harm yourself or others. You will be discharged home.      Symptoms to Report: feeling more aggressive, increased confusion, losing more sleep, mood getting worse or thoughts of suicide    Lifestyle Adjustment: Take medications only as prescribed. Do not use alcohol or illicit drugs. Attend all scheduled appointments with your outpatient providers. Call at least 24 hours in advance if you need to reschedule an appointment to ensure continued access to your outpatient providers. Contact the appropriate professional or hotline listed below as needed for support if your symptoms continue, or call 9-1-1 in an emergency.        Psychiatry Follow-up:   Psychiatry Appointment Date/Time: Thursday 8/10/17 at 2:20 pm   Psychiatrist: Dr. Lemus     Address: [email or web address]   [address] [place] [address] [address], [place] [address]     Phone Number: [phone]  Fax: [phone]       Therapy Appointment Date/Time: Wednesday 8/2/2017 at 11 am   Therapist:Castillo Rincon     Address: [address] [place] [address] Av, [address], [place], [place] [address]  Phone: [phone]   E-mail: [email or web address]     Follow up as needed with your primary care provider:  Anabel Lord at [facility], [address] [place] [address]  Phone: [phone]    Resources:   Crisis Intervention: 373.497.1102 or 323-814-8511 (TTY: 115.158.3733).  Call anytime for help.  National [place] on Mental Illness (www.[place].kelly.org): 832.581.5991 or 328-509-4356.  Alcoholics Anonymous (www.alcoholics-anonymous.org): Check your phone book for your local chapter.  Suicide Awareness Voices of Education (SAVE) (www.save.org): 601-374-VGVW (4704)  National Suicide Prevention Line (www.mentalhealthmn.org): 801-382-CVFZ (1224)  Mental Health Consumer/Survivor Network of [place] (www.mhcsn.net): 500.103.2693 or 375-559-3934  Mental Health Association of [place] (www.mentalhealth.org): 495.851.1746 or 052-837-7078    General Medication Instructions:   See your medication sheet(s) for instructions.   Take all medicines as directed.  Make no changes unless your doctor suggests them.   Go to all your doctor visits.  Be sure to have all your required lab tests. This way, your medicines can be refilled on time.  Do not use any drugs not prescribed by your doctor.  Avoid alcohol.    The treatment team has appreciated the opportunity to work with you.  We wish you the best in the future.  You will be receiving a follow-up phone call within the next three days from a representative from behavioral health.  You have identified the best phone number to reach you as [phone].     If you  "have any questions or concerns our unit number is [phone].         Pending Results     No orders found from 2017 to 2017.            Admission Information     Date & Time Provider Department Dept. Phone    2017 Heber Lemus MD UR 32NR [phone]      Your Vitals Were     Blood Pressure Pulse Temperature Respirations Weight Pulse Oximetry    106/74 (BP Location: Left arm) 120 95.8  F (35.4  C) (Oral) 14 81.3 kg (179 lb 3.2 oz) 92%    BMI (Body Mass Index)                   29.86 kg/m2           MyChart Information     [facility] lets you send messages to your doctor, view your test results, renew your prescriptions, schedule appointments and more. To sign up, go to [email or web address] . Click on \"Log in\" on the left side of the screen, which will take you to the Welcome page. Then click on \"Sign up Now\" on the right side of the page.     You will be asked to enter the access code listed below, as well as some personal information. Please follow the directions to create your username and password.     Your access code is: [email or web address]  Expires: 2017 11:05 AM     Your access code will  in 90 days. If you need help or a new code, please call your [facility] clinic or [phone].        Care EveryWhere ID     This is your Care EveryWhere ID. This could be used by other organizations to access your [facility] medical records  [id number]        Equal Access to Services     [facility] : Hadii aad ku hadasho Soomaali, waaxda luqadaha, qaybta kaalmada adeegyada, waxay kaitlynn marrufo . So [facility] [phone].    ATENCIÓN: Si habla español, tiene a gray disposición servicios gratuitos de asistencia lingüística. Llame al [phone].    We comply with applicable federal civil rights laws and [place] laws. We do not discriminate on the basis of race, color, national origin, age, disability sex, sexual orientation or gender identity.               Review of your medicines    "   START taking        Dose / Directions    levomilnacipran 80 MG 24 hr capsule   Commonly known as:  FETZIMA ER   Used for:  Severe recurrent major depression without psychotic features (H)        Dose:  80 mg   Take 1 capsule (80 mg) by mouth daily   Quantity:  30 capsule   Refills:  0         CONTINUE these medicines which have NOT CHANGED        Dose / Directions    albuterol 108 (90 BASE) MCG/ACT Inhaler   Commonly known as:  PROAIR HFA/PROVENTIL HFA/VENTOLIN HFA        Dose:  2 puff   Inhale 2 puffs into the lungs every 4 hours as needed for shortness of breath / dyspnea or wheezing   Refills:  0       CLONAZEPAM PO        Dose:  0.5 mg   Take 0.5 mg by mouth 3 times daily   Refills:  0       fluticasone 27.5 MCG/SPRAY spray   Commonly known as:  VERAMYST        Dose:  2 spray   Spray 2 sprays into both nostrils daily   Refills:  0       fluticasone-vilanterol 200-25 MCG/INH oral inhaler   Commonly known as:  BREO ELLIPTA   Used for:  Asthma exacerbation        Dose:  1 puff   Inhale 1 puff into the lungs daily   Quantity:  1 Inhaler   Refills:  0       hydrOXYzine 25 MG tablet   Commonly known as:  ATARAX   Used for:  Anxiety        Dose:  25-50 mg   Take 1-2 tablets (25-50 mg) by mouth every 4 hours as needed for anxiety   Quantity:  30 tablet   Refills:  0       ipratropium - albuterol 0.5 mg/2.5 mg/3 mL 0.5-2.5 (3) MG/3ML neb solution   Commonly known as:  DUONEB        Dose:  3 mL   Take 1 vial (3 mLs) by nebulization every 4 hours as needed for shortness of breath / dyspnea or wheezing   Quantity:  360 mL   Refills:  0       montelukast 10 MG tablet   Commonly known as:  SINGULAIR        Dose:  10 mg   Take 10 mg by mouth At Bedtime   Refills:  0       nicotine 14 MG/24HR 24 hr patch   Commonly known as:  NICODERM CQ   Used for:  Asthma exacerbation        Dose:  1 patch   Place 1 patch onto the skin daily   Quantity:  14 patch   Refills:  0       pantoprazole 40 MG EC tablet   Commonly known as:   PROTONIX   Used for:  Gastroesophageal reflux disease without esophagitis        Dose:  40 mg   Take 1 tablet (40 mg) by mouth every morning (before breakfast)   Quantity:  15 tablet   Refills:  0       polyethylene glycol Packet   Commonly known as:  MIRALAX/GLYCOLAX   Used for:  Slow transit constipation        Dose:  17 g   Take 17 g by mouth daily as needed for constipation   Quantity:  7 packet   Refills:  0       senna-docusate 8.6-50 MG per tablet   Commonly known as:  SENOKOT-S;PERICOLACE   Used for:  Slow transit constipation        Dose:  2 tablet   Take 2 tablets by mouth 2 times daily as needed (constipation )   Quantity:  20 tablet   Refills:  0       traZODone 50 MG tablet   Commonly known as:  DESYREL   Used for:  Severe recurrent major depression without psychotic features (H)        Dose:  50 mg   Take 1 tablet (50 mg) by mouth nightly as needed for sleep   Quantity:  15 tablet   Refills:  0       VITAMIN D (CHOLECALCIFEROL) PO        Dose:  5000 Units   Take 5,000 Units by mouth daily   Refills:  0         STOP taking     venlafaxine 150 MG Tb24 24 hr tablet   Commonly known as:  EFFEXOR-ER                Where to get your medicines      These medications were sent to [facility] [place], [place] - [address]  [address] [address]     Phone:  [phone]     levomilnacipran 80 MG 24 hr capsule                Protect others around you: Learn how to safely use, store and throw away your medicines at www.disposemymeds.org.             Medication List: This is a list of all your medications and when to take them. Check marks below indicate your daily home schedule. Keep this list as a reference.      Medications           Morning Afternoon Evening Bedtime As Needed    albuterol 108 (90 BASE) MCG/ACT Inhaler   Commonly known as:  PROAIR HFA/PROVENTIL HFA/VENTOLIN HFA   Inhale 2 puffs into the lungs every 4 hours as needed for shortness of breath / dyspnea or  wheezing   Last time this was given:  2 puffs on 7/26/2017  5:42 AM                                   CLONAZEPAM PO   Take 0.5 mg by mouth 3 times daily   Last time this was given:  0.5 mg on 7/28/2017  8:00 AM                                         fluticasone 27.5 MCG/SPRAY spray   Commonly known as:  VERAMYST   Spray 2 sprays into both nostrils daily                                   fluticasone-vilanterol 200-25 MCG/INH oral inhaler   Commonly known as:  BREO ELLIPTA   Inhale 1 puff into the lungs daily   Last time this was given:  1 puff on 7/28/2017  5:57 AM                                   hydrOXYzine 25 MG tablet   Commonly known as:  ATARAX   Take 1-2 tablets (25-50 mg) by mouth every 4 hours as needed for anxiety   Last time this was given:  50 mg on 7/27/2017 12:05 PM                                   ipratropium - albuterol 0.5 mg/2.5 mg/3 mL 0.5-2.5 (3) MG/3ML neb solution   Commonly known as:  DUONEB   Take 1 vial (3 mLs) by nebulization every 4 hours as needed for shortness of breath / dyspnea or wheezing   Last time this was given:  3 mLs on 7/28/2017  6:19 AM                                   levomilnacipran 80 MG 24 hr capsule   Commonly known as:  FETZIMA ER   Take 1 capsule (80 mg) by mouth daily   Last time this was given:  80 mg on 7/28/2017  8:00 AM                                   montelukast 10 MG tablet   Commonly known as:  SINGULAIR   Take 10 mg by mouth At Bedtime   Last time this was given:  10 mg on 7/27/2017  8:29 PM                                   nicotine 14 MG/24HR 24 hr patch   Commonly known as:  NICODERM CQ   Place 1 patch onto the skin daily   Last time this was given:  1 patch on 7/27/2017  8:22 AM                                   pantoprazole 40 MG EC tablet   Commonly known as:  PROTONIX   Take 1 tablet (40 mg) by mouth every morning (before breakfast)   Last time this was given:  40 mg on 7/28/2017  5:52 AM                                   polyethylene glycol Packet    Commonly known as:  MIRALAX/GLYCOLAX   Take 17 g by mouth daily as needed for constipation                                   senna-docusate 8.6-50 MG per tablet   Commonly known as:  SENOKOT-S;PERICOLACE   Take 2 tablets by mouth 2 times daily as needed (constipation )                                   traZODone 50 MG tablet   Commonly known as:  DESYREL   Take 1 tablet (50 mg) by mouth nightly as needed for sleep   Last time this was given:  100 mg on 7/27/2017  8:29 PM                                   VITAMIN D (CHOLECALCIFEROL) PO   Take 5,000 Units by mouth daily

## 2017-07-13 NOTE — PROGRESS NOTES
Pt was admitted top room 352 accompanied by . Pt had thoughts of taking all her pills or slitting her throat. Pt feels safe on unit. Pt met with therapist yesterday and she suggested pt call Dr. Lemus. Pt rates her depression at a 8/10. Pt rates anxiety at a 8/10. Pt states klonopin and vistaril she takes at home. Poor appetite. Lives at home with .

## 2017-07-14 LAB
HGB BLD-MCNC: 14.1 G/DL (ref 11.7–15.7)
INTERPRETATION ECG - MUSE: NORMAL
POTASSIUM SERPL-SCNC: 3.7 MMOL/L (ref 3.4–5.3)

## 2017-07-14 PROCEDURE — 36415 COLL VENOUS BLD VENIPUNCTURE: CPT | Performed by: PSYCHIATRY & NEUROLOGY

## 2017-07-14 PROCEDURE — 84132 ASSAY OF SERUM POTASSIUM: CPT | Performed by: PSYCHIATRY & NEUROLOGY

## 2017-07-14 PROCEDURE — 85018 HEMOGLOBIN: CPT | Performed by: PSYCHIATRY & NEUROLOGY

## 2017-07-14 PROCEDURE — 12400007 ZZH R&B MH INTERMEDIATE UMMC

## 2017-07-14 PROCEDURE — 25000132 ZZH RX MED GY IP 250 OP 250 PS 637: Performed by: PSYCHIATRY & NEUROLOGY

## 2017-07-14 RX ADMIN — CLONAZEPAM 0.5 MG: 0.5 TABLET ORAL at 20:46

## 2017-07-14 RX ADMIN — NICOTINE 1 PATCH: 14 PATCH, EXTENDED RELEASE TRANSDERMAL at 09:21

## 2017-07-14 RX ADMIN — FLUTICASONE FUROATE AND VILANTEROL TRIFENATATE 1 PUFF: 200; 25 POWDER RESPIRATORY (INHALATION) at 09:15

## 2017-07-14 RX ADMIN — CLONAZEPAM 0.5 MG: 0.5 TABLET ORAL at 14:45

## 2017-07-14 RX ADMIN — MONTELUKAST SODIUM 10 MG: 10 TABLET, FILM COATED ORAL at 20:46

## 2017-07-14 RX ADMIN — CLONAZEPAM 0.5 MG: 0.5 TABLET ORAL at 09:13

## 2017-07-14 RX ADMIN — CHOLECALCIFEROL CAP 125 MCG (5000 UNIT) 5000 UNITS: 125 CAP at 09:13

## 2017-07-14 RX ADMIN — TRAZODONE HYDROCHLORIDE 50 MG: 50 TABLET ORAL at 20:48

## 2017-07-14 RX ADMIN — VENLAFAXINE HYDROCHLORIDE 150 MG: 150 TABLET, EXTENDED RELEASE ORAL at 09:14

## 2017-07-14 RX ADMIN — PANTOPRAZOLE SODIUM 40 MG: 40 TABLET, DELAYED RELEASE ORAL at 09:14

## 2017-07-14 RX ADMIN — FLUTICASONE PROPIONATE 2 SPRAY: 50 SPRAY, METERED NASAL at 09:15

## 2017-07-14 RX ADMIN — HYDROXYZINE HYDROCHLORIDE 50 MG: 25 TABLET ORAL at 11:30

## 2017-07-14 RX ADMIN — LEVOMILNACIPRAN HYDROCHLORIDE 20 MG: 20 CAPSULE, EXTENDED RELEASE ORAL at 09:13

## 2017-07-14 ASSESSMENT — ACTIVITIES OF DAILY LIVING (ADL)
DRESS: SCRUBS (BEHAVIORAL HEALTH)
ORAL_HYGIENE: INDEPENDENT
GROOMING: INDEPENDENT;PROMPTS
DRESS: INDEPENDENT
GROOMING: INDEPENDENT
ORAL_HYGIENE: INDEPENDENT;PROMPTS
LAUNDRY: WITH SUPERVISION
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
## 2017-07-14 NOTE — PROGRESS NOTES
Patient stated suicidal thoughts are unchanged but she remains able to contract for safety on the unit.  Patient was isolative to her room and intermittently tearful.  She asked that her inhaler and nasal spray be moved to AM.  Patient stated she has already seen ECT video and read pamphlets in May and did not need to do so again.  She is aware she is to remain NPO after midnight.  EKG completed - sinus bradycardia noted but patient was awakened from a sound sleep for reading.

## 2017-07-14 NOTE — H&P
Admit Note    Pt was seen.  Note Dictated.    Plan:  Taper off Effexor XR.  Begin and Titrate Fetzima.  Have Anesthesiology and Medicine evaluate for possible ECT.  Absolute soonest ECT would be Monday [address]1717 if cleared.      Heber Lemus MD

## 2017-07-14 NOTE — CONSULTS
ANESTHESIA PREOP EVALUATION    Procedure: * No surgery found * ECT scheduled for 7-17-17 @ ~ 0730    HPI: Nuha Lees is a 31 year old female who has medical history notable for asthma and MDD who is currently admitted to the inpatient psychiatry [facility] for her mood symptoms. She has previously undergone ECT for her MDD with some success. Anesthesiology was consulted today because at the time of her last ECT procedure she had some bilious emesis on induction with some mild aspiration. However, due to her significant asthma, she developed a reactive bronchospasm which required treatment with IM epi, steroid, albuterol, and nebulized epinephrine. The patient had an unremarkable cxr post procedure and an otherwise uneventful recovery from a pulmonary standpoint with wheezing resolved prior to discharge from the PACU.     Currently, she states she is feeling well from a pulmonary standpoint but does get significant shortness of breath with exertion and wheezing. She is currently on albuterol prn, duoneb prn, singulair daily, fluticasone-vilanterol (BREO) inhaled daily for her asthma which controls her symptoms at baseline with additional PRN inhalers based on exacerbations. At rest, she is denying any wheezing or shortness of breath.     She has had heartburn in the past but notes she has not had in a while and used to take prn medications for it (likely a H2 blocker based on the patient's description). She denies any recent reflux, nausea, vomiting, or any other similar symptoms. She has had no issues eating or drinking and denies any dysphagia. She is currently on protonix daily via her primary service.     From a mental health perspective she is symptomatic but stable at the time. She appears to be currently managed with clonazepam, venlafaxine, and trazodone scheduled with prn atarax.     PMHx/PSHx/ROS:  Past Medical History:   Diagnosis Date     Asthma        Past Surgical History:   Procedure Laterality Date      KNEE SURGERY Right     Roller derby injury      REPAIR PTOSIS  10/5/2011    Procedure:REPAIR PTOSIS; Left Upper Lid Ptosis Repair; Surgeon:MONICA ANDREWS; Location: [facility]     TUBAL LIGATION  2016       ROS as stated above    Soc Hx:   Social History   Substance Use Topics     Smoking status: Former Smoker     Smokeless tobacco: Not on file     Alcohol use Yes       Allergies:   Allergies   Allergen Reactions     Codeine Sulfate      Sulfa Drugs      Trintellix [Vortioxetine]        Meds:   Prescriptions Prior to Admission   Medication Sig Dispense Refill Last Dose     hydrOXYzine (ATARAX) 25 MG tablet Take 1-2 tablets (25-50 mg) by mouth every 4 hours as needed for anxiety 30 tablet 0      albuterol (PROAIR HFA/PROVENTIL HFA/VENTOLIN HFA) 108 (90 BASE) MCG/ACT Inhaler Inhale 2 puffs into the lungs every 4 hours as needed for shortness of breath / dyspnea or wheezing        fluticasone-vilanterol (BREO ELLIPTA) 200-25 MCG/INH oral inhaler Inhale 1 puff into the lungs daily 1 Inhaler 0      ipratropium - albuterol 0.5 mg/2.5 mg/3 mL (DUONEB) 0.5-2.5 (3) MG/3ML neb solution Take 1 vial (3 mLs) by nebulization every 4 hours as needed for shortness of breath / dyspnea or wheezing 360 mL       traZODone (DESYREL) 50 MG tablet Take 1 tablet (50 mg) by mouth nightly as needed for sleep 15 tablet 0      venlafaxine (EFFEXOR-ER) 150 MG TB24 24 hr tablet Take 1 tablet (150 mg) by mouth daily (with breakfast) 30 each 0      polyethylene glycol (MIRALAX/GLYCOLAX) Packet Take 17 g by mouth daily as needed for constipation 7 packet 0      senna-docusate (SENOKOT-S;PERICOLACE) 8.6-50 MG per tablet Take 2 tablets by mouth 2 times daily as needed (constipation ) 20 tablet 0      pantoprazole (PROTONIX) 40 MG EC tablet Take 1 tablet (40 mg) by mouth every morning (before breakfast) 15 tablet 0      nicotine (NICODERM CQ) 14 MG/24HR 24 hr patch Place 1 patch onto the skin daily 14 patch 0      fluticasone (VERAMYST) 27.5 MCG/SPRAY  spray Spray 2 sprays into both nostrils daily   5/16/2017 at Unknown time     montelukast (SINGULAIR) 10 MG tablet Take 10 mg by mouth At Bedtime   5/16/2017 at Unknown time     CLONAZEPAM PO Take 0.5 mg by mouth 3 times daily   Unknown at Unknown time     VITAMIN D, CHOLECALCIFEROL, PO Take 5,000 Units by mouth daily   5/17/2017 at Unknown time       No current outpatient prescriptions on file.       Physical Exam:  VS: Temp:  [97.8  F (36.6  C)-98  F (36.7  C)] 98  F (36.7  C)  Pulse:  [75] 75  Resp:  [16] 16  BP: (88)/(63) 88/63   [unfilled], Weight   Wt Readings from Last 2 Encounters:   05/19/17 82.6 kg (182 lb)   10/05/11 67.1 kg (148 lb)     GEN - flat affect, dysthymic appearing  HEENT - CN II-XII intact bilaterally, MP I airway, > 3FB thyromental distance, full range of motion of the neck  PULM - symmetric chest rise, no retractions, no wheezing, no stridor, breathing appears non labored  CV - regular rate and rhythm, no EKG to review  EXT - no edema      Labs:    BMP:  Recent Labs   Lab Test  07/14/17   0751  05/21/17   0533   NA   --   143   POTASSIUM  3.7  4.2   CHLORIDE   --   110*   CO2   --   28   BUN   --   17   CR   --   0.70   GLC   --   103*   NICHOLAS   --   8.5     LFTs:   Recent Labs   Lab Test  05/18/17   1700   PROTTOTAL  6.9   ALBUMIN  3.6   BILITOTAL  0.5   ALKPHOS  51   AST  15   ALT  22     CBC:   Recent Labs   Lab Test  07/14/17   0751  05/21/17   0533   WBC   --   18.5*   RBC   --   3.93   HGB  14.1  11.7   HCT   --   35.7   MCV   --   91   MCH   --   29.8   MCHC   --   32.8   RDW   --   12.7   PLT   --   208     Coags:  No results for input(s): INR, PTT, FIBR in the last 74047 hours.      A/P: 30 yo F wit ha  History of depressed mood and anxiety - admitted for SI - and asthma to be scheduled for ECT on 7-17-17. She is otherwise clinically stable, does not have any acute changes or concerns for aspiration and should be able to safely undergo ECT without requiring general anesthesia and  intubation.     - recommend patient take all of her routine medications on the day prior to and of surgery. Especially taking her protonix, singulair, and BREO will be important. She should take a duoneb inhalation prior to transferring to the procedure area in the morning.   - strict NPO after 11pm on 7-16-17 (sunday), only sips of water with medications  - notify staff if she experiences any nausea or emesis between now and the procedure as well as any shortness of breath or wheezing.   - we will likely plan on pre-treating prior to the procedure with an additional albuterol treatment, bicitra to neutralize her gastric pH approximately 15min prior to induction.   - final plan to be determined by the staff Anesthesiologist on the day of the treatment.       Patient discussed with Staff Anesthesiologist.    Min Marrero  Anesthesia Resident CA-2  Pager [phone]  July 14, 2017, 11:20 AM

## 2017-07-14 NOTE — CONSULTS
[facility]  Internal Medicine Consult    Nuha Lees MRN# [id number]   Age: 31 year old YOB: 1985     Date of Admission: 7/13/2017  Date of Consult:  7/14/2017    Requesting Service: Behavioral Health - Heber Lemus MD  Reason for Consult: Pre ECT Medicine Evaluation   Indication for ECT: Depression, MDD     Chief Complaint: Depression   HPI: Nuha Lees is a 31 year old female with history of asthma, OCD, depression, MDD, and SI admitted to station 32 for severe depression and suicidal ideation. She recently established care with Dr. Lemus at [facility] per chart review.  Today, she denies any acute physical complaints, including fever, chills, chest pain, dyspnea, nausea, vomiting, abdominal pain, and urinary symptoms. She is tearful and anxious.       Review of Systems:   Cardiovascular: negative  Pulmonary: shortness of breath with activity at baseline, reports using PRN Albuterol approximately 3x week for rescue inhaler   Neurological: report some short term memory problems- difficulty recalling certain words, other wise negative      Past Medical History:   Prior Anesthesia: Yes  If yes, any complications: Patient reports having undergone anesthesia before, however the most recent was for initiation of ECT in May 2017.  Shortly after receiving anesthesia at that time, she suffered an asthma exacerbation and had an aspiration event.  She subsequently required transfer to Internal Medicine service. She was not intubated.      Prior ECT: No - attempted to undergo ECT in may 2017 but did not complete d/t asthma exacerbation event, aspiration, as described above    Cardiovascular: CAD No, MI No, HTN No, CHF No, pacemaker or ICD No  Pulmonary: Asthma/COPD Yes, Prior respiratory failure or need for emergent intubation No, On theophylline No (note that theophylline use is a contraindication to proceeding with ECT, needs to be tapered off prior)  Neurological:  Brain tumor No, TIA/CVA No, Dementia No, Neuromuscular Disease (including post polio syndrome) No, Seizures and/or Epilepsy No, Head Injury None, Intracranial Hemorrhage No    Past Surgical History:   Past Surgical History:   Procedure Laterality Date     KNEE SURGERY Right     Roller derby injury      REPAIR PTOSIS  10/5/2011    Procedure:REPAIR PTOSIS; Left Upper Lid Ptosis Repair; Surgeon:MONICA ANDREWS; Location: [facility]     TUBAL LIGATION         Allergies:      Allergies   Allergen Reactions     Codeine Sulfate      Sulfa Drugs      Trintellix [Vortioxetine]        Medication list reviewed.    Physical Exam:  BP (!) 88/63  Pulse 75  Temp 98  F (36.7  C) (Oral)  Resp 16   Cardiovascular: RRR. S1, S2. No murmurs, rubs, gallops.   Pulmonary: Effort normal. Lungs CTAB with no wheezing, rales, rhonchi.   Neurological: A&Ox3. No focal deficits. PERRLA. Follows commands.     Data:  EK, Qtc 377, Sinus bradycardia   nonspecific ST changes in the anterior leads   Head Imaging: n/a - deferred   Labs:   CBC:  Recent Labs   Lab Test  17   0533   WBC   --   18.5*   RBC   --   3.93   HGB  14.1  11.7   HCT   --   35.7   MCV   --   91   MCH   --   29.8   MCHC   --   32.8   RDW   --   12.7   PLT   --   208     CMP:  Recent Labs   Lab Test  17   0533   17   1700   NA   --   143   < >  142   POTASSIUM  3.7  4.2   < >  4.0   CHLORIDE   --   110*   < >  106   NICHOLAS   --   8.5   < >  9.2   CO2   --   28   < >  30   BUN   --   17   < >  16   CR   --   0.70   < >  1.01   GLC   --   103*   < >  83   AST   --    --    --   15   ALT   --    --    --   22   BILITOTAL   --    --    --   0.5   ALBUMIN   --    --    --   3.6   PROTTOTAL   --    --    --   6.9   ALKPHOS   --    --    --   51    < > = values in this interval not displayed.     HCG: s/p tubal ligation    Recommendations:   Diuretics and oral hypoglycemics should be held the morning of ECT.   All other antihypertensive  medications can be continued.     Patient does not have absolute medical contraindications to proceeding with ECT at this time. Treatment plan per psychiatry.       Aria Castro  [facility] Centerist Service  Pager: [phone]

## 2017-07-14 NOTE — PROGRESS NOTES
Pt in bed all day. Does not come out for meals. Staff brought tray down to her. Pt was encouraged to come out for 15- 30 minutes, try a group & just attempt to be present in milieu or activities a bit. She agreed & got up at 245pm. Her  is to visit around 3 pm. She admitted she has some suicidal thoughts but is able to manage this & keep safe.      07/14/17 1400   Behavioral Health   Hallucinations denies / not responding to hallucinations   Thinking poor concentration   Orientation person: oriented   Memory other (see comment)  (mark)   Insight insight appropriate to situation   Judgement impaired   Affect blunted, flat;sad   Mood depressed;hopeless   Physical Appearance/Attire disheveled   Suicidality thoughts only   Self Injury thoughts only   Elopement (no risk)   Speech coherent   Medication Sensitivity no stated side effects   Psychomotor / Gait slow   Coping/Psychosocial   Verbalized Emotional State depression   Plan Of Care Reviewed With patient   Psycho Education   Type of Intervention 1:1 intervention   Response participates with cues/redirection   Hours 0.5   Treatment Detail check in   Activities of Daily Living   Hygiene/Grooming independent;prompts   Oral Hygiene independent;prompts   Dress scrubs (behavioral health)   Laundry with supervision   Room Organization independent   Behavioral Health Interventions   Depression maintain safety precautions;maintain safe secure environment;provide emotional support   Social and Therapeutic Interventions (Depression) encourage participation in therapeutic groups and milieu activities

## 2017-07-14 NOTE — H&P
DATE OF ADMISSION:  07/13/2017    IDENTIFICATION:  The patient is a 31-year-old   woman who lives with her  and 3 kids in [place].  She just started seeing Dr. Lemus at [facility] in [place] after a recent hospitalization at [facility].  She had been admitted to Psychiatry briefly in 05/2017 and was going to do ECT but had an asthma attack and ECT was aborted.  She ended up being transferred to medicine and then discharged to home on 05/21/2017.  She presented to Dr. Lemus's office, 07/13/2017, stating she was extremely depressed, not functioning and suicidal with plans to hang herself, overdose or slit her throat.      HISTORY OF PRESENT ILLNESS:  The patient was staffed by Dr. Lemus on 07/13/2017.  She has a history of depression, anxiety and panic disorder with agoraphobia.  Depression started in her early 20s.  She has had periods of remission lasting up to 1-1/2 years several times in the past.  Current depressive episode has been going on for over 2 years.  It started when her dad got very sick.  Mood is currently quite depressed and anxious.  She sleeps all the time.  Appetite is poor.  Weight is stable.  Energy level is low.  She does not do anything around the house.  She is not doing the laundry.  She is not cooking.  She is not bathing.  She does not leave the house.  All she wants to do is lay in bed.  She was put on prednisone at her last hospitalization and her mood was actually a little better.  When she went off the prednisone end of 05/2017, her mood started getting worse again.  She also started smoking again.  She is anhedonic.  She feels hopeless, helpless and worthless.  Libido is poor.  She has panic attacks.  These occur out of the blue about once a week lasting 5-15 minutes.  She describes sudden onset of anxiety with tachycardia, shortness of breath, clammy skin and tremor.  Her mind races and she cannot sit still.  She has become  agoraphobic and gets anxious when she leaves the house.  She has not been leaving the house.  She worries excessively.  She endorses muscle tension, poor concentration, fatigue, irritability and sleep disturbance.  Her temper can explode out of the blue occasionally.  She denies PTSD symptoms.  When asked about obsessive compulsive symptoms, she says everything has to be in 3s.  She will splash her face with water 3 times; she taps things 3 times; she cuts all of her food 3 times.  Memory has been poor.  Physically, she feels out of shape and has asthma which is not well-controlled.  When asked about eating disorder, she stated years ago when she was depressed, she lost 125 pounds that was 4 or 5 years ago but did not actually related due to an eating disorder.  She denies gambling.  Stressors include her depression, dad's illness, her weight and work.  She is on short-term disability.  Is not clear if that has actually run out.      PAST PSYCHIATRIC HISTORY:  The patient reports generalized anxiety symptoms since puberty.  Depression started in her early 20s.  She has had 5 psychiatric admissions including this current admission.  She was at [facility] in 04/2017 and had another admission in [place], 1 in [place] and a psychiatric admission at [facility] in 05/2017.  That admission was prompted by worsening depression and pursuit of ECT.  She had 1 attempt at ECT but had an asthma attack during anesthesia before the ECT was actually done and ended up being transferred to medicine and then went home from the medicine service without getting any ECT.  She started psychotherapy about 11 years ago and has seen therapists off and on since.  Her therapist is Dr. Zuleyma Rincon in [place].  Psychotropic medications used have included but are not limited to Wellbutrin, Celexa, Cymbalta, Trintellix, Prozac, Lexapro, melatonin, Unisom, Abilify, Paxil and now Fetzima.  She had a  suicide attempt in the past by trying to slit her ankles.  She has no guns at home.   removed them and took them to his dad's.  She has never actually had ECT.  There was a question in the past about possible bipolar illness.  Twice in her entire life, she felt elevated mood with a lot of energy; that only lasted a couple of hours.    CHEMICAL DEPENDENCY HISTORY:  The patient smokes a pack of cigarettes per day.  She smokes marijuana.  She denies any other drug use.  She denies alcohol problems.    PAST MEDICAL HISTORY:  Asthma, history of tubal ligation, history of knee surgery to repair a torn ligaments from roller Couch accident, history of the ptosis, repair of left upper lid, kidney stones.    MEDICATIONS:  Next section.    MEDICATIONS:    1.  Vitamin D3, 5000 units daily.    2.  Klonopin 0.5 milligrams 3 times daily.    3.  Veramyst 2 sprays both nostrils daily.    4.  Fluticasone 1 puff daily.    5.  Singulair 10 milligrams at bedtime.    6.  Protonix 40 milligrams daily.    7.  Effexor  milligrams daily.      ALLERGIES:  Codeine, sulfa, Trintellix.      FAMILY HISTORY:  Mother had depression.  Aunt, grandmother and cousin had depression.  Mother was an alcoholic.  A cousin committed suicide.  Father had congestive heart failure.    SOCIAL HISTORY:  The patient was born in [place], [place].  She was raised in [place], [place] by her parents.  She has 3 sisters and no brothers.  Father was a , mother was a housewife.  Mother had depression and neglected the kids.  Parents  when she was in her 20s.  Mother moved back to [place].  The patient graduated high school and took some college classes.  She was  to her first  for 2-1/2 years and has 2 daughters ages 9 and 11 by him.  She has been with her second  for 4 years.  He has a 19-year-old son.  The patient lives in [place] with her , 19-year-old colleen and 2 daughters.  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  It is actually not clear if she has any disability left or if she has lost her insurance.   was a  but is on disability from the [employer].  She denies legal problems.  She was never in the .    MENTAL STATUS EXAMINATION:  The patient is an adequately groomed 31-year-old  woman looking her stated age.  Gait and station are normal.  Psychomotor activity is within normal limits.  Speech is fluent and normal in rate. Language is normal.  Mood is depressed and anxious.  Affect is sad.  Attention and concentration appear adequate.  Thought process is normal.  Associations are normal.  She denied any psychotic symptoms.  She has suicidal thoughts with current plans to kill herself by hanging, overdosing or slitting her throat.  She denies homicidal thoughts.  Fund of knowledge is adequate.  Remote and recent memory are adequate.  Insight and judgment are impaired by her depression and suicidality.  She was alert and oriented x3.  There was no evidence of movement disorder.    ASSESSMENT:  The patient is a 31-year-old woman with a history of severe depression and anxiety.  She has had multiple psychiatric admissions.  She is admitted at this time for worsening depression and suicidal ideation with plans to kill herself.  She is interested in pursuing ECT.  Is not clear if she is medically a candidate for ECT due to her asthma.  She does not feel her medications are adequately controlling her depression.  She is currently in the process of tapering off Effexor with plans to begin Fetzima.  She was supposed to have started Fetzima but has not done so.  Next section.    DIAGNOSES:  Axis I.  Major depressive disorder, recurrent.  Panic disorder with agoraphobia.  Generalized anxiety disorder.      Axis II. Deferred.      Axis III.  Asthma.      PLAN:    1.  Continue taper off Effexor XR.  She started out at 300 milligrams daily and is now down to 150  milligrams daily, will continue to taper off that.  2.  Begin and titrate Fetzima.  She completed cytochrome P450 testing last month.  3.  Spoke with Dr. Silvestre from Anesthesia.  They will meet with her and decide if she is an anesthetic candidate for ECT.  4.  Will have internal medicine evaluate and decide if she is a candidate for ECT.  5.  Expect stabilization and discharge back home with her family.          Heber Lemus MD    D:  07/13/2017 18:42 T:  07/14/2017 00:33  Document:  [id number] CV\TD

## 2017-07-15 ENCOUNTER — ANESTHESIA EVENT (OUTPATIENT)
Dept: SURGERY | Facility: CLINIC | Age: 32
End: 2017-07-15

## 2017-07-15 PROCEDURE — 25000132 ZZH RX MED GY IP 250 OP 250 PS 637: Performed by: PSYCHIATRY & NEUROLOGY

## 2017-07-15 PROCEDURE — 12400007 ZZH R&B MH INTERMEDIATE UMMC

## 2017-07-15 RX ORDER — NAPROXEN 250 MG/1
250 TABLET ORAL 3 TIMES DAILY PRN
Status: DISCONTINUED | OUTPATIENT
Start: 2017-07-15 | End: 2017-07-17

## 2017-07-15 RX ORDER — TRAZODONE HYDROCHLORIDE 100 MG/1
100 TABLET ORAL AT BEDTIME
Status: DISCONTINUED | OUTPATIENT
Start: 2017-07-16 | End: 2017-07-28 | Stop reason: HOSPADM

## 2017-07-15 RX ADMIN — HYDROXYZINE HYDROCHLORIDE 50 MG: 25 TABLET ORAL at 12:38

## 2017-07-15 RX ADMIN — NAPROXEN 250 MG: 250 TABLET ORAL at 21:05

## 2017-07-15 RX ADMIN — ACETAMINOPHEN 650 MG: 325 TABLET, FILM COATED ORAL at 14:38

## 2017-07-15 RX ADMIN — MONTELUKAST SODIUM 10 MG: 10 TABLET, FILM COATED ORAL at 21:05

## 2017-07-15 RX ADMIN — CLONAZEPAM 0.5 MG: 0.5 TABLET ORAL at 08:43

## 2017-07-15 RX ADMIN — PANTOPRAZOLE SODIUM 40 MG: 40 TABLET, DELAYED RELEASE ORAL at 08:44

## 2017-07-15 RX ADMIN — VENLAFAXINE HYDROCHLORIDE 150 MG: 150 TABLET, EXTENDED RELEASE ORAL at 08:44

## 2017-07-15 RX ADMIN — CLONAZEPAM 0.5 MG: 0.5 TABLET ORAL at 21:05

## 2017-07-15 RX ADMIN — CHOLECALCIFEROL CAP 125 MCG (5000 UNIT) 5000 UNITS: 125 CAP at 08:43

## 2017-07-15 RX ADMIN — LEVOMILNACIPRAN HYDROCHLORIDE 20 MG: 20 CAPSULE, EXTENDED RELEASE ORAL at 08:43

## 2017-07-15 RX ADMIN — FLUTICASONE PROPIONATE 2 SPRAY: 50 SPRAY, METERED NASAL at 08:41

## 2017-07-15 RX ADMIN — NICOTINE 1 PATCH: 14 PATCH, EXTENDED RELEASE TRANSDERMAL at 08:42

## 2017-07-15 RX ADMIN — FLUTICASONE FUROATE AND VILANTEROL TRIFENATATE 1 PUFF: 200; 25 POWDER RESPIRATORY (INHALATION) at 08:41

## 2017-07-15 RX ADMIN — TRAZODONE HYDROCHLORIDE 50 MG: 50 TABLET ORAL at 21:05

## 2017-07-15 RX ADMIN — CLONAZEPAM 0.5 MG: 0.5 TABLET ORAL at 14:38

## 2017-07-15 ASSESSMENT — ACTIVITIES OF DAILY LIVING (ADL)
ORAL_HYGIENE: INDEPENDENT
DRESS: INDEPENDENT
HYGIENE/GROOMING: INDEPENDENT
LAUNDRY: UNABLE TO COMPLETE

## 2017-07-15 NOTE — PLAN OF CARE
Problem: Depressive Symptoms  Goal: Depressive Symptoms  ..Pt. Reports absence of suicidal ideation, self-injurious behavior. Pt. Attends and participates in groups. Pt. Engages in 1:1 with staff. Pt. Reports increase in mood and affect. Pt. s appetite is appropriate to current medical status. Pt. Reports sleep is adequate. Pt. Accepts medications without difficulty. Pt. s thoughts appear clear, reality oriented. Pt. s behavior is appropriate with interactions of staff and other pts. ..Pt. Will report side affects of ECT, ie. Confusion, headache, body aches. Pt. Will engage with staff to evaluate any changes in pt. s emotional, physical, and/or mental status.   Outcome: No Change  Patient remains very depressed although was slightly more present in milieu this evening.  Suicidal ideation is unchanged and patient is able to contract for safety on the unit.  She is aware of plans for ECT in PACU at 0630 Monday.  Patient received PRN Trazodone for sleep.

## 2017-07-15 NOTE — PROGRESS NOTES
"   07/15/17 1400   Behavioral Health   Hallucinations denies / not responding to hallucinations   Thinking poor concentration   Orientation place: oriented;date: oriented;time: oriented;person: oriented   Memory baseline memory   Insight insight appropriate to situation;insight appropriate to events   Judgement impaired   Eye Contact at examiner   Affect blunted, flat;sad   Mood depressed   Physical Appearance/Attire untidy   Suicidality thoughts and plan  (continues to experience SI. Contracts for safety.)   Self Injury (\"no\")   Elopement (no indication of a desire to elope.)   Activity isolative   Speech coherent;clear   Medication Sensitivity no observed side effects;no stated side effects   Psychomotor / Gait balanced;steady     Isolates in room. Continues to have suicidal thoughts with plan, but contracts for safety here. Appears sad. Seems to have low energy.   "

## 2017-07-16 PROCEDURE — 12400007 ZZH R&B MH INTERMEDIATE UMMC

## 2017-07-16 PROCEDURE — 25000132 ZZH RX MED GY IP 250 OP 250 PS 637: Performed by: PSYCHIATRY & NEUROLOGY

## 2017-07-16 RX ADMIN — CHOLECALCIFEROL CAP 125 MCG (5000 UNIT) 5000 UNITS: 125 CAP at 08:09

## 2017-07-16 RX ADMIN — VENLAFAXINE HYDROCHLORIDE 150 MG: 150 TABLET, EXTENDED RELEASE ORAL at 08:09

## 2017-07-16 RX ADMIN — FLUTICASONE PROPIONATE 2 SPRAY: 50 SPRAY, METERED NASAL at 08:10

## 2017-07-16 RX ADMIN — TRAZODONE HYDROCHLORIDE 100 MG: 100 TABLET ORAL at 19:13

## 2017-07-16 RX ADMIN — PANTOPRAZOLE SODIUM 40 MG: 40 TABLET, DELAYED RELEASE ORAL at 08:09

## 2017-07-16 RX ADMIN — CLONAZEPAM 0.5 MG: 0.5 TABLET ORAL at 08:09

## 2017-07-16 RX ADMIN — FLUTICASONE FUROATE AND VILANTEROL TRIFENATATE 1 PUFF: 200; 25 POWDER RESPIRATORY (INHALATION) at 08:09

## 2017-07-16 RX ADMIN — NICOTINE 1 PATCH: 14 PATCH, EXTENDED RELEASE TRANSDERMAL at 08:08

## 2017-07-16 RX ADMIN — HYDROXYZINE HYDROCHLORIDE 50 MG: 25 TABLET ORAL at 12:49

## 2017-07-16 RX ADMIN — LEVOMILNACIPRAN HYDROCHLORIDE 40 MG: 40 CAPSULE, EXTENDED RELEASE ORAL at 08:09

## 2017-07-16 RX ADMIN — CLONAZEPAM 0.5 MG: 0.5 TABLET ORAL at 14:24

## 2017-07-16 RX ADMIN — MONTELUKAST SODIUM 10 MG: 10 TABLET, FILM COATED ORAL at 19:13

## 2017-07-16 ASSESSMENT — ACTIVITIES OF DAILY LIVING (ADL)
ORAL_HYGIENE: INDEPENDENT
GROOMING: INDEPENDENT
LAUNDRY: WITH SUPERVISION
DRESS: INDEPENDENT
DRESS: INDEPENDENT
ORAL_HYGIENE: INDEPENDENT
HYGIENE/GROOMING: INDEPENDENT

## 2017-07-16 NOTE — PLAN OF CARE
"Problem: Depressive Symptoms  Goal: Depressive Symptoms  ..Pt. Reports absence of suicidal ideation, self-injurious behavior. Pt. Attends and participates in groups. Pt. Engages in 1:1 with staff. Pt. Reports increase in mood and affect. Pt. s appetite is appropriate to current medical status. Pt. Reports sleep is adequate. Pt. Accepts medications without difficulty. Pt. s thoughts appear clear, reality oriented. Pt. s behavior is appropriate with interactions of staff and other pts. ..Pt. Will report side affects of ECT, ie. Confusion, headache, body aches. Pt. Will engage with staff to evaluate any changes in pt. s emotional, physical, and/or mental status.   Outcome: No Change  Nuha was isolative to her room 1/2 of the shift sleeping on and off. Very flat, blunted affect. Polite and appropriate when engaged. She states her mood is \"bad\". She feels anxious about ECT tomorrow due to what happened in the past when she tried to have ECT, and because she doesn't really know the process of what is going to take place tomorrow. I educated her on the ECT process, and what was relayed in the anesthesia note.      She showered without prompting, spoke with her  on the phone today. Appetite appropriate.       "

## 2017-07-17 ENCOUNTER — SURGERY (OUTPATIENT)
Age: 32
End: 2017-07-17

## 2017-07-17 ENCOUNTER — ANESTHESIA (OUTPATIENT)
Dept: SURGERY | Facility: CLINIC | Age: 32
End: 2017-07-17

## 2017-07-17 LAB
ANION GAP SERPL CALCULATED.3IONS-SCNC: 11 MMOL/L (ref 3–14)
BUN SERPL-MCNC: 13 MG/DL (ref 7–30)
CALCIUM SERPL-MCNC: 9.1 MG/DL (ref 8.5–10.1)
CHLORIDE SERPL-SCNC: 107 MMOL/L (ref 94–109)
CO2 SERPL-SCNC: 24 MMOL/L (ref 20–32)
CREAT SERPL-MCNC: 1.01 MG/DL (ref 0.52–1.04)
ERYTHROCYTE [DISTWIDTH] IN BLOOD BY AUTOMATED COUNT: 12.4 % (ref 10–15)
GFR SERPL CREATININE-BSD FRML MDRD: 63 ML/MIN/1.7M2
GLUCOSE SERPL-MCNC: 97 MG/DL (ref 70–99)
HCT VFR BLD AUTO: 46.9 % (ref 35–47)
HGB BLD-MCNC: 15.5 G/DL (ref 11.7–15.7)
MCH RBC QN AUTO: 29.2 PG (ref 26.5–33)
MCHC RBC AUTO-ENTMCNC: 33 G/DL (ref 31.5–36.5)
MCV RBC AUTO: 88 FL (ref 78–100)
PLATELET # BLD AUTO: 229 10E9/L (ref 150–450)
POTASSIUM SERPL-SCNC: 3.6 MMOL/L (ref 3.4–5.3)
RBC # BLD AUTO: 5.31 10E12/L (ref 3.8–5.2)
SODIUM SERPL-SCNC: 142 MMOL/L (ref 133–144)
WBC # BLD AUTO: 9.2 10E9/L (ref 4–11)

## 2017-07-17 PROCEDURE — 85027 COMPLETE CBC AUTOMATED: CPT | Performed by: PSYCHIATRY & NEUROLOGY

## 2017-07-17 PROCEDURE — 94640 AIRWAY INHALATION TREATMENT: CPT

## 2017-07-17 PROCEDURE — 36415 COLL VENOUS BLD VENIPUNCTURE: CPT | Performed by: PSYCHIATRY & NEUROLOGY

## 2017-07-17 PROCEDURE — 97150 GROUP THERAPEUTIC PROCEDURES: CPT | Mod: GO

## 2017-07-17 PROCEDURE — 25000128 H RX IP 250 OP 636: Performed by: STUDENT IN AN ORGANIZED HEALTH CARE EDUCATION/TRAINING PROGRAM

## 2017-07-17 PROCEDURE — 80048 BASIC METABOLIC PNL TOTAL CA: CPT | Performed by: PSYCHIATRY & NEUROLOGY

## 2017-07-17 PROCEDURE — 25000132 ZZH RX MED GY IP 250 OP 250 PS 637: Performed by: PSYCHIATRY & NEUROLOGY

## 2017-07-17 PROCEDURE — 37000008 ZZH ANESTHESIA TECHNICAL FEE, 1ST 30 MIN

## 2017-07-17 PROCEDURE — 25000132 ZZH RX MED GY IP 250 OP 250 PS 637: Performed by: STUDENT IN AN ORGANIZED HEALTH CARE EDUCATION/TRAINING PROGRAM

## 2017-07-17 PROCEDURE — 71000016 ZZH RECOVERY PHASE 1 LEVEL 3 FIRST HR

## 2017-07-17 PROCEDURE — 25000128 H RX IP 250 OP 636

## 2017-07-17 PROCEDURE — 90870 ELECTROCONVULSIVE THERAPY: CPT

## 2017-07-17 PROCEDURE — 25000125 ZZHC RX 250

## 2017-07-17 PROCEDURE — 40000170 ZZH STATISTIC PRE-PROCEDURE ASSESSMENT II

## 2017-07-17 PROCEDURE — 71000027 ZZH RECOVERY PHASE 2 EACH 15 MINS

## 2017-07-17 PROCEDURE — 25000125 ZZHC RX 250: Performed by: PSYCHIATRY & NEUROLOGY

## 2017-07-17 PROCEDURE — 12400007 ZZH R&B MH INTERMEDIATE UMMC

## 2017-07-17 PROCEDURE — 25000125 ZZHC RX 250: Performed by: STUDENT IN AN ORGANIZED HEALTH CARE EDUCATION/TRAINING PROGRAM

## 2017-07-17 RX ORDER — ONDANSETRON 2 MG/ML
4 INJECTION INTRAMUSCULAR; INTRAVENOUS EVERY 30 MIN PRN
Status: DISCONTINUED | OUTPATIENT
Start: 2017-07-17 | End: 2017-07-17 | Stop reason: HOSPADM

## 2017-07-17 RX ORDER — ETOMIDATE 2 MG/ML
INJECTION INTRAVENOUS PRN
Status: DISCONTINUED | OUTPATIENT
Start: 2017-07-17 | End: 2017-07-17

## 2017-07-17 RX ORDER — ONDANSETRON 2 MG/ML
4 INJECTION INTRAMUSCULAR; INTRAVENOUS
Status: ACTIVE | OUTPATIENT
Start: 2017-07-17 | End: 2017-07-17

## 2017-07-17 RX ORDER — ALBUTEROL SULFATE 0.83 MG/ML
2.5 SOLUTION RESPIRATORY (INHALATION) ONCE
Status: COMPLETED | OUTPATIENT
Start: 2017-07-17 | End: 2017-07-17

## 2017-07-17 RX ORDER — SODIUM CHLORIDE, SODIUM LACTATE, POTASSIUM CHLORIDE, CALCIUM CHLORIDE 600; 310; 30; 20 MG/100ML; MG/100ML; MG/100ML; MG/100ML
INJECTION, SOLUTION INTRAVENOUS CONTINUOUS PRN
Status: DISCONTINUED | OUTPATIENT
Start: 2017-07-17 | End: 2017-07-17

## 2017-07-17 RX ORDER — CITRIC ACID/SODIUM CITRATE 334-500MG
30 SOLUTION, ORAL ORAL ONCE
Status: COMPLETED | OUTPATIENT
Start: 2017-07-17 | End: 2017-07-17

## 2017-07-17 RX ORDER — SODIUM CHLORIDE, SODIUM LACTATE, POTASSIUM CHLORIDE, CALCIUM CHLORIDE 600; 310; 30; 20 MG/100ML; MG/100ML; MG/100ML; MG/100ML
INJECTION, SOLUTION INTRAVENOUS CONTINUOUS
Status: DISCONTINUED | OUTPATIENT
Start: 2017-07-17 | End: 2017-07-17 | Stop reason: HOSPADM

## 2017-07-17 RX ORDER — NAPROXEN 500 MG/1
500 TABLET ORAL EVERY 12 HOURS PRN
Status: DISCONTINUED | OUTPATIENT
Start: 2017-07-17 | End: 2017-07-18

## 2017-07-17 RX ORDER — ONDANSETRON 4 MG/1
4 TABLET, ORALLY DISINTEGRATING ORAL EVERY 30 MIN PRN
Status: DISCONTINUED | OUTPATIENT
Start: 2017-07-17 | End: 2017-07-17 | Stop reason: HOSPADM

## 2017-07-17 RX ORDER — ONDANSETRON 2 MG/ML
INJECTION INTRAMUSCULAR; INTRAVENOUS PRN
Status: DISCONTINUED | OUTPATIENT
Start: 2017-07-17 | End: 2017-07-17

## 2017-07-17 RX ADMIN — RACEPINEPHRINE HYDROCHLORIDE 0.5 ML: 11.25 SOLUTION RESPIRATORY (INHALATION) at 07:33

## 2017-07-17 RX ADMIN — PANTOPRAZOLE SODIUM 40 MG: 40 TABLET, DELAYED RELEASE ORAL at 06:38

## 2017-07-17 RX ADMIN — MONTELUKAST SODIUM 10 MG: 10 TABLET, FILM COATED ORAL at 21:07

## 2017-07-17 RX ADMIN — CLONAZEPAM 0.5 MG: 0.5 TABLET ORAL at 21:07

## 2017-07-17 RX ADMIN — HYDROXYZINE HYDROCHLORIDE 50 MG: 25 TABLET ORAL at 15:54

## 2017-07-17 RX ADMIN — VENLAFAXINE HYDROCHLORIDE 75 MG: 75 TABLET, EXTENDED RELEASE ORAL at 13:29

## 2017-07-17 RX ADMIN — SODIUM CHLORIDE, POTASSIUM CHLORIDE, SODIUM LACTATE AND CALCIUM CHLORIDE: 600; 310; 30; 20 INJECTION, SOLUTION INTRAVENOUS at 07:44

## 2017-07-17 RX ADMIN — LEVOMILNACIPRAN HYDROCHLORIDE 40 MG: 40 CAPSULE, EXTENDED RELEASE ORAL at 13:29

## 2017-07-17 RX ADMIN — NAPROXEN 250 MG: 250 TABLET ORAL at 21:07

## 2017-07-17 RX ADMIN — SODIUM CITRATE AND CITRIC ACID MONOHYDRATE 30 ML: 500; 334 SOLUTION ORAL at 07:02

## 2017-07-17 RX ADMIN — FLUTICASONE FUROATE AND VILANTEROL TRIFENATATE 1 PUFF: 200; 25 POWDER RESPIRATORY (INHALATION) at 06:43

## 2017-07-17 RX ADMIN — ACETAMINOPHEN 650 MG: 325 TABLET, FILM COATED ORAL at 13:29

## 2017-07-17 RX ADMIN — ACETAMINOPHEN 650 MG: 325 TABLET, FILM COATED ORAL at 08:29

## 2017-07-17 RX ADMIN — SODIUM CHLORIDE, POTASSIUM CHLORIDE, SODIUM LACTATE AND CALCIUM CHLORIDE: 600; 310; 30; 20 INJECTION, SOLUTION INTRAVENOUS at 07:10

## 2017-07-17 RX ADMIN — ALBUTEROL SULFATE 2.5 MG: 2.5 SOLUTION RESPIRATORY (INHALATION) at 08:40

## 2017-07-17 RX ADMIN — TRAZODONE HYDROCHLORIDE 100 MG: 100 TABLET ORAL at 21:07

## 2017-07-17 RX ADMIN — IPRATROPIUM BROMIDE AND ALBUTEROL SULFATE 3 ML: .5; 3 SOLUTION RESPIRATORY (INHALATION) at 06:01

## 2017-07-17 RX ADMIN — ETOMIDATE 16 MG: 2 INJECTION INTRAVENOUS at 07:13

## 2017-07-17 RX ADMIN — CLONAZEPAM 0.5 MG: 0.5 TABLET ORAL at 17:12

## 2017-07-17 RX ADMIN — CLONAZEPAM 0.5 MG: 0.5 TABLET ORAL at 13:28

## 2017-07-17 RX ADMIN — NAPROXEN 250 MG: 250 TABLET ORAL at 17:15

## 2017-07-17 RX ADMIN — ALBUTEROL SULFATE 2 PUFF: 90 AEROSOL, METERED RESPIRATORY (INHALATION) at 06:40

## 2017-07-17 RX ADMIN — ONDANSETRON 4 MG: 2 INJECTION INTRAMUSCULAR; INTRAVENOUS at 07:19

## 2017-07-17 RX ADMIN — Medication 100 MG: at 07:14

## 2017-07-17 ASSESSMENT — ACTIVITIES OF DAILY LIVING (ADL)
DRESS: INDEPENDENT
GROOMING: INDEPENDENT
ORAL_HYGIENE: INDEPENDENT

## 2017-07-17 NOTE — OR NURSING
Nuha was brought to PACU for scheduled ECT treatment. This RN placed and IV and 2nd RN began monitoring and premedicated with bicitra as ordered by ROB. Pt turned over to anesthesia for RSI and ETT. Procedure completed by Dr. Lemus. Nuha remained stable throughout and turned over to nursing for phase 1 recovery at 0741. Pt became tachypnic and LS's were auscultated for inspiratory stridor and expiratory wheezes. Racemic Epi neb administered with some improvement. Pt tachycardic to 130's and RR 40's following neb with saturations in low 90's. Pt transitioned to oxymask at 10L and weaned to maintain saturations >96%. Pt denies pain and nausea at this time but remains lethargic. Transitioned to NC @ 4L and will continue to wean. Tolerating ice chips. Hr tranding downward now in the 80's, RR into the 20's and 98% on 2L NC.

## 2017-07-17 NOTE — ANESTHESIA POSTPROCEDURE EVALUATION
Patient: Nuha Lees    Procedure(s):  ECT - Dr. Lemus    Diagnosis:See Med Hx  Diagnosis Additional Information: No value filed.    Anesthesia Type:  General, RSI, ETT    Note:  Anesthesia Post Evaluation    Patient location during evaluation: PACU  Patient participation: Other/plan (See Comments) (sleepy post ictal)  Level of consciousness: awake and sleepy but conscious  Pain management: adequate  Airway patency: patent  Cardiovascular status: acceptable  Respiratory status: acceptable  Hydration status: acceptable  PONV: none     Anesthetic complications: None    Comments: Racemic epi neb given post extubation with improvement in wheezing and respiratory effort.         Last vitals:  Vitals:    07/17/17 0741 07/17/17 0745 07/17/17 0800   BP: 124/66 115/63 102/65   Pulse:      Resp: (!) 36 24 22   Temp: 36.9  C (98.5  F)  37.1  C (98.7  F)   SpO2: 94% 98% 98%         Electronically Signed By: Sven Mann MD  July 17, 2017  8:22 AM

## 2017-07-17 NOTE — ANESTHESIA PREPROCEDURE EVALUATION
"     Physical Exam  Normal systems: dental    Airway   Mallampati: I  TM distance: >3 FB  Neck ROM: full    Dental     Cardiovascular       Pulmonary                     Anesthesia Plan      History & Physical Review  History and physical reviewed and following examination; no interval change.    ASA Status:  2 .    NPO Status:  > 6 hours    Plan for General, RSI and ETT with Intravenous induction.     Pre-op duoneb  Bicitra prior to procedure  Dilute epinephrine available in room  Preoxygenation, head of bed elevated.   RSI with brevital (80mg) and succinycholine (100mg).  CMAC and ETT for airway protection.   Bite block      Postoperative Care      Consents  Anesthetic plan, risks, benefits and alternatives discussed with:  Patient..        ANESTHESIA PREOP EVALUATION    PROCEDURE: Procedure(s):  ECT - Dr. Lemus    HPI: Nuha Lees is a 31 year old female who presents for above procedure 2/2 major depression.     \"Nuha Lees is a 31 year old female who has medical history notable for asthma and MDD who is currently admitted to the inpatient psychiatry [facility] for her mood symptoms. She has previously undergone ECT for her MDD with some success. Anesthesiology was consulted today because at the time of her last ECT procedure she had some bilious emesis on induction with some mild aspiration. However, due to her significant asthma, she developed a reactive bronchospasm which required treatment with IM epi, steroid, albuterol, and nebulized epinephrine. The patient had an unremarkable cxr post procedure and an otherwise uneventful recovery from a pulmonary standpoint with wheezing resolved prior to discharge from the PACU.      Currently, she states she is feeling well from a pulmonary standpoint but does get significant shortness of breath with exertion and wheezing. She is currently on albuterol prn, duoneb prn, singulair daily, fluticasone-vilanterol (BREO) inhaled daily for her asthma which controls " "her symptoms at baseline with additional PRN inhalers based on exacerbations. At rest, she is denying any wheezing or shortness of breath.      She has had heartburn in the past but notes she has not had in a while and used to take prn medications for it (likely a H2 blocker based on the patient's description). She denies any recent reflux, nausea, vomiting, or any other similar symptoms. She has had no issues eating or drinking and denies any dysphagia. She is currently on protonix daily via her primary service.      From a mental health perspective she is symptomatic but stable at the time. She appears to be currently managed with clonazepam, venlafaxine, and trazodone scheduled with prn atarax.\"           PAST MEDICAL HISTORY:    Past Medical History:   Diagnosis Date     Asthma        PAST SURGICAL HISTORY:    Past Surgical History:   Procedure Laterality Date     KNEE SURGERY Right     Roller derby injury      REPAIR PTOSIS  10/5/2011    Procedure:REPAIR PTOSIS; Left Upper Lid Ptosis Repair; Surgeon:MONICA ANDREWS; Location:[facility]     TUBAL LIGATION  2016       PAST ANESTHESIA HISTORY:     No personal or family h/o anesthesia problems    SOCIAL HISTORY:       Social History   Substance Use Topics     Smoking status: Former Smoker     Smokeless tobacco: Not on file     Alcohol use Yes       ALLERGIES:     Allergies   Allergen Reactions     Codeine Sulfate      Sulfa Drugs      Trintellix [Vortioxetine]        MEDICATIONS:     Prescriptions Prior to Admission   Medication Sig Dispense Refill Last Dose     hydrOXYzine (ATARAX) 25 MG tablet Take 1-2 tablets (25-50 mg) by mouth every 4 hours as needed for anxiety 30 tablet 0      albuterol (PROAIR HFA/PROVENTIL HFA/VENTOLIN HFA) 108 (90 BASE) MCG/ACT Inhaler Inhale 2 puffs into the lungs every 4 hours as needed for shortness of breath / dyspnea or wheezing        fluticasone-vilanterol (BREO ELLIPTA) 200-25 MCG/INH oral inhaler Inhale 1 puff into the lungs daily 1 " Inhaler 0      ipratropium - albuterol 0.5 mg/2.5 mg/3 mL (DUONEB) 0.5-2.5 (3) MG/3ML neb solution Take 1 vial (3 mLs) by nebulization every 4 hours as needed for shortness of breath / dyspnea or wheezing 360 mL       traZODone (DESYREL) 50 MG tablet Take 1 tablet (50 mg) by mouth nightly as needed for sleep 15 tablet 0      venlafaxine (EFFEXOR-ER) 150 MG TB24 24 hr tablet Take 1 tablet (150 mg) by mouth daily (with breakfast) 30 each 0      polyethylene glycol (MIRALAX/GLYCOLAX) Packet Take 17 g by mouth daily as needed for constipation 7 packet 0      senna-docusate (SENOKOT-S;PERICOLACE) 8.6-50 MG per tablet Take 2 tablets by mouth 2 times daily as needed (constipation ) 20 tablet 0      pantoprazole (PROTONIX) 40 MG EC tablet Take 1 tablet (40 mg) by mouth every morning (before breakfast) 15 tablet 0      nicotine (NICODERM CQ) 14 MG/24HR 24 hr patch Place 1 patch onto the skin daily 14 patch 0      fluticasone (VERAMYST) 27.5 MCG/SPRAY spray Spray 2 sprays into both nostrils daily   5/16/2017 at Unknown time     montelukast (SINGULAIR) 10 MG tablet Take 10 mg by mouth At Bedtime   5/16/2017 at Unknown time     CLONAZEPAM PO Take 0.5 mg by mouth 3 times daily   Unknown at Unknown time     VITAMIN D, CHOLECALCIFEROL, PO Take 5,000 Units by mouth daily   5/17/2017 at Unknown time       No current outpatient prescriptions on file.       Current Facility-Administered Medications Ordered in Epic   Medication Dose Route Frequency Provider Last Rate Last Dose     naproxen (NAPROSYN) tablet 250 mg  250 mg Oral TID PRN Brad Burrell MD   250 mg at 07/15/17 2105     traZODone (DESYREL) tablet 100 mg  100 mg Oral At Bedtime rBad Burrell MD   100 mg at 07/16/17 1913     albuterol (PROAIR HFA/PROVENTIL HFA/VENTOLIN HFA) Inhaler 2 puff  2 puff Inhalation Q4H PRN Heber Lemus MD         clonazePAM (klonoPIN) tablet 0.5 mg  0.5 mg Oral TID Heber Lemus MD   0.5 mg at 07/16/17 0978      fluticasone-vilanterol (BREO ELLIPTA) 200-25 MCG/INH oral inhaler 1 puff  1 puff Inhalation Daily Heber Lemus MD   1 puff at 07/16/17 0809     hydrOXYzine (ATARAX) tablet 25-50 mg  25-50 mg Oral Q4H PRN Heber Lemus MD   50 mg at 07/16/17 1249     ipratropium - albuterol 0.5 mg/2.5 mg/3 mL (DUONEB) neb solution 3 mL  3 mL Nebulization Q4H PRN Heber Lemus MD         montelukast (SINGULAIR) tablet 10 mg  10 mg Oral At Bedtime Heber Lemus MD   10 mg at 07/16/17 1913     nicotine (NICODERM CQ) 14 MG/24HR 24 hr patch 1 patch  1 patch Transdermal Daily Heber Lemus MD   1 patch at 07/16/17 0808     pantoprazole (PROTONIX) EC tablet 40 mg  40 mg Oral QAM AC Heber Lemus MD   40 mg at 07/16/17 0809     cholecalciferol (vitamin D3) capsule CAPS 5,000 Units  5,000 Units Oral Daily Heber Lemus MD   5,000 Units at 07/16/17 0809     acetaminophen (TYLENOL) tablet 650 mg  650 mg Oral Q4H PRN Heber Lemus MD   650 mg at 07/15/17 1438     alum & mag hydroxide-simethicone (MYLANTA ES/MAALOX  ES) suspension 30 mL  30 mL Oral Q4H PRN Heber Lemus MD         magnesium hydroxide (MILK OF MAGNESIA) suspension 30 mL  30 mL Oral At Bedtime PRN Heber Lemus MD         nicotine patch REMOVAL   Transdermal Daily Heber Lemus MD         nicotine Patch in Place   Transdermal Q8H Heber Lemus MD         [START ON 7/17/2017] venlafaxine (EFFEXOR-ER) 24 hr tablet 75 mg  75 mg Oral Daily with breakfast Heber Lemus MD         [START ON 7/23/2017] venlafaxine (EFFEXOR-ER) 24 hr tablet 37.5 mg  37.5 mg Oral Daily with breakfast Heber Lemus MD         levomilnacipran (FETZIMA ER) 24 hr capsule 40 mg  40 mg Oral Daily Heber Lemus MD   40 mg at 07/16/17 0809     [START ON 7/23/2017] levomilnacipran (FETZIMA ER) 24 hr capsule 80 mg  80 mg Oral Daily Heber Lemus MD         fluticasone (FLONASE) 50 MCG/ACT spray 2 spray  2 spray Both Nostrils Daily Heber Lemus MD   2 spray at 07/16/17 0810     No current  Epic-ordered outpatient prescriptions on file.       PHYSICAL EXAM:    Vitals: T 98, P 116, BP 89/63, R 16, SpO2  , Weight   Wt Readings from Last 2 Encounters:   07/16/17 79.8 kg (176 lb)   05/19/17 82.6 kg (182 lb)       See doc flowsheet    Temp: 36.7  C (98  F) Temp  Min: 36.7  C (98  F)  Max: 36.7  C (98  F)  Resp: 16 Resp  Min: 16  Max: 16    No Data Recorded    No Data Recorded    No Data Recorded     No data recorded.  No data recorded.      NPO STATUS: see doc flowsheet    LABS:    BMP:  Recent Labs   Lab Test  07/14/17   0751  05/21/17   0533   NA   --   143   POTASSIUM  3.7  4.2   CHLORIDE   --   110*   CO2   --   28   BUN   --   17   CR   --   0.70   GLC   --   103*   NICHOLAS   --   8.5       LFTs:   Recent Labs   Lab Test  05/18/17   1700   PROTTOTAL  6.9   ALBUMIN  3.6   BILITOTAL  0.5   ALKPHOS  51   AST  15   ALT  22       CBC:   Recent Labs   Lab Test  07/14/17   0751  05/21/17   0533   WBC   --   18.5*   RBC   --   3.93   HGB  14.1  11.7   HCT   --   35.7   MCV   --   91   MCH   --   29.8   MCHC   --   32.8   RDW   --   12.7   PLT   --   208       Coags:  No results for input(s): INR, PTT, FIBR in the last 76773 hours.    Imaging:  No orders to display       Sven Mann MD  Anesthesiology Staff  Pager [phone]    7/16/2017  10:37 PM

## 2017-07-17 NOTE — PROGRESS NOTES
[facility]   Dr. Lemus's Psychiatric Progress Note  2017      Patient:  Nuha Lees   Medical Record Number:  [id number]  :  1985          Interim History:   The patient's care was discussed with the treatment team and chart notes were reviewed.  The weekend was not good.   She remains very depressed.  She had bad dreams last night about not being able to breathe.  When she woke up, she was breathing fine.      Psychiatric ROS:  Mood: depressed and anxious  Sleep: poor with middle insomnia and bad dreams  Appetite:  decreased  Eating:  less  Energy Level:LOW  Concentration/Memory Problems:  YES  Suicidal Thoughts:Yes ;  Still feels suicidal but feels safe on the unit  Homicidal Thoughts:No  Psychotic Symptoms: No  Medication Side Effects:  Headaches  Medication Compliance:Yes   Physical Complaints:  positive for asthma;  Stays it's not too bad today         Medications:     Current Facility-Administered Medications   Medication     lactated ringers infusion     ondansetron (ZOFRAN-ODT) ODT tab 4 mg    Or     ondansetron (ZOFRAN) injection 4 mg     prochlorperazine (COMPAZINE) injection 5-10 mg     [Auto Hold] ondansetron (ZOFRAN) injection 4 mg     [Auto Hold] naproxen (NAPROSYN) tablet 250 mg     [Auto Hold] traZODone (DESYREL) tablet 100 mg     [Auto Hold] albuterol (PROAIR HFA/PROVENTIL HFA/VENTOLIN HFA) Inhaler 2 puff     [Auto Hold] clonazePAM (klonoPIN) tablet 0.5 mg     [Auto Hold] fluticasone-vilanterol (BREO ELLIPTA) 200-25 MCG/INH oral inhaler 1 puff     [Auto Hold] hydrOXYzine (ATARAX) tablet 25-50 mg     [Auto Hold] ipratropium - albuterol 0.5 mg/2.5 mg/3 mL (DUONEB) neb solution 3 mL     [Auto Hold] montelukast (SINGULAIR) tablet 10 mg     [Auto Hold] nicotine (NICODERM CQ) 14 MG/24HR 24 hr patch 1 patch     [Auto Hold] pantoprazole (PROTONIX) EC tablet 40 mg     [Auto Hold] cholecalciferol (vitamin D3) capsule CAPS 5,000 Units     [Auto Hold]  acetaminophen (TYLENOL) tablet 650 mg     [Auto Hold] alum & mag hydroxide-simethicone (MYLANTA ES/MAALOX  ES) suspension 30 mL     [Auto Hold] magnesium hydroxide (MILK OF MAGNESIA) suspension 30 mL     [Auto Hold] nicotine patch REMOVAL     [Auto Hold] nicotine Patch in Place     [Auto Hold] venlafaxine (EFFEXOR-ER) 24 hr tablet 75 mg     [Auto Hold] venlafaxine (EFFEXOR-ER) 24 hr tablet 37.5 mg     [Auto Hold] levomilnacipran (FETZIMA ER) 24 hr capsule 40 mg     [Auto Hold] levomilnacipran (FETZIMA ER) 24 hr capsule 80 mg     [Auto Hold] fluticasone (FLONASE) 50 MCG/ACT spray 2 spray             Allergies:     Allergies   Allergen Reactions     Codeine Sulfate      Sulfa Drugs      Trintellix [Vortioxetine]             Psychiatric Examination:   BP 95/54  Pulse 75  Temp 97.3  F (36.3  C) (Oral)  Resp 16  Wt 79.8 kg (176 lb)  SpO2 98%  BMI 29.32 kg/m2  Weight is 176 lbs 0 oz  Body mass index is 29.32 kg/(m^2).    Appearance:  poorly groomed  Attitude:  cooperative  Eye Contact:  good  Mood:  depressed and anxious  Affect:  mood congruent  Speech:  clear, coherent  Psychomotor Behavior:  no evidence of tardive dyskinesia, dystonia, or tics  Throught Process:  logical  Associations:  no loose associations  Thought Content:  no evidence of psychotic thought and active suicidal ideation present;  Feels safe on the unit and is able to contract for safety.    Insight:  fair  Judgement:  intact  Oriented to:  time, person, and place  Attention Span and Concentration:  intact  Recent and Remote Memory:  intact  Gait:Normal    Risk/Potential for Dangerousness:  Multiple Active Diagnoses:HIGH  Self Care:HIGH  Suicide:HIGH  Assault:LOW  Self Injurious Behaviors:LOW  Inappropriate Sexual Behavior:LOW         Labs:        Recent Results (from the past 1008 hour(s))   EKG 12-lead, complete    Collection Time: 07/13/17  4:51 PM   Result Value Ref Range    Interpretation ECG Click View Image link to view waveform and result     Potassium    Collection Time: 07/14/17  7:51 AM   Result Value Ref Range    Potassium 3.7 3.4 - 5.3 mmol/L   Hemoglobin    Collection Time: 07/14/17  7:51 AM   Result Value Ref Range    Hemoglobin 14.1 11.7 - 15.7 g/dL   CBC with platelets    Collection Time: 07/17/17  6:09 AM   Result Value Ref Range    WBC 9.2 4.0 - 11.0 10e9/L    RBC Count 5.31 (H) 3.8 - 5.2 10e12/L    Hemoglobin 15.5 11.7 - 15.7 g/dL    Hematocrit 46.9 35.0 - 47.0 %    MCV 88 78 - 100 fl    MCH 29.2 26.5 - 33.0 pg    MCHC 33.0 31.5 - 36.5 g/dL    RDW 12.4 10.0 - 15.0 %    Platelet Count 229 150 - 450 10e9/L         Impression:   This is a 31 year old female admitted with worsening depression and suicidal ideation and inability to care for herself.  She remains depressed and suicidal.  She's starting ECT today.           DIagnoses:      Axis I.  Major depressive disorder, recurrent.  Panic disorder with agoraphobia.  Generalized anxiety disorder.       Axis II. Deferred.       Axis III.  Asthma.             Plan:     Continue tapering off Effexor XR.    Started and titrating Fetzima.  She completed cytochrome P450 testing last month.  Begin ECT series.   ECT treatment #1 is today.   Treatment #2 will be 7/17/17.    Expect stabilization and discharge back home with her family.   Patient needs to be in the hospital due to here severe depression and suicidality.      Heber Lemus MD

## 2017-07-17 NOTE — ANESTHESIA CARE TRANSFER NOTE
Patient: Nuha Lees    Procedure(s):  ECT - Dr. Lemus    Diagnosis: See Med Hx  Diagnosis Additional Information: No value filed.    Anesthesia Type:   General, RSI, ETT     Note:  Airway :Blow-by  Patient transferred to:PACU  Comments: Arrived in PACU, report to RN, vitals stable, patient comfortable.  Receiving racaemic epi for stridor, stridor resolving quickly      Vitals: (Last set prior to Anesthesia Care Transfer)    CRNA VITALS  7/17/2017 0703 - 7/17/2017 0737      7/17/2017             Pulse: 97    Ht Rate: 97    SpO2: 96 %                Electronically Signed By: NATHAN Jim CRNA  July 17, 2017  7:37 AM

## 2017-07-17 NOTE — OR NURSING
Pt given albuterol neb at 0830 in PACU for faint inspiratory wheezes. Pt transferred back to inpatient room accompanied by this RN at the request of ROB Samuel. Report given face to face by this RN inpatient RN. Transfer uneventful and patient has met all criteria from general anesthesia recovery.

## 2017-07-17 NOTE — PROGRESS NOTES
Feels that ECT is helping; had a hard weekend. Also hard not to see her kids (10 and 12) but would prefer not to have them visit, does talk to them daily on phone. Pt  coming to visit this week, can only afford gas for about 1 trip per week as it is a 4 hour round trip.

## 2017-07-17 NOTE — PROGRESS NOTES
07/16/17 2200   Behavioral Health   Hallucinations denies / not responding to hallucinations   Thinking intact   Orientation person: oriented;place: oriented;date: oriented;time: oriented   Memory baseline memory   Insight insight appropriate to situation;insight appropriate to events   Judgement intact   Eye Contact at examiner   Affect blunted, flat;sad   Mood depressed   Physical Appearance/Attire attire appropriate to age and situation;neat   Hygiene well groomed   Suicidality (WDL) WDL   Self Injury (WDL) WDL   Elopement (WDL) WDL   Activity withdrawn   Speech (WDL) WDL   Medication Sensitivity (WDL) WDL   Psychomotor Gait (WDL) WDL   Overt Agression (WDL) WDL   Psycho Education   Type of Intervention 1:1 intervention   Response participates, initiates socially appropriate   Hours 0.5   Treatment Detail Self Reflection   Activities of Daily Living   Hygiene/Grooming independent   Oral Hygiene independent   Dress independent   Laundry with supervision   Room Organization independent   patient largely isolative this shift, coming out briefly to socialize with peers. No SI or SIB reported or observed this shift.

## 2017-07-17 NOTE — PROCEDURES
Procedure/Surgery Information   [facility], [place]    Bedside Procedure Note  Date of Service (when I performed the procedure): 07/17/2017    Nuha Lees is a 31 year old female patient.  No diagnosis found.  Past Medical History:   Diagnosis Date     Asthma      Temp: 97.3  F (36.3  C) Temp src: Oral BP: 95/54 Pulse: 75   Resp: 16 SpO2: 98 % O2 Device: None (Room air)      Procedures     Heber Lemus     [facility], [place]   ECT Procedure Note  07/17/2017    Nuha Lees [id number]   31 year old 1985     Patient Status: Inpatient    Is this the first in a series of 12 treatments?  Yes    History and Physical: Reviewed in medical record    Consent: Informed consent was signed by: patient    Date Consent Signed: 7/17/17      Allergies   Allergen Reactions     Codeine Sulfate      Sulfa Drugs      Trintellix [Vortioxetine]        Weight:  176 lbs 0 oz    Patient Preparations: Glasses/Contacts removed         Indications for ECT:   Medications ineffective and Imminent suicide risk         Clinical Narrative:   Patient was admitted with worsening depression and suicidal ideation.  NPO after 2400.  Alert and Oriented x 3.  Planned intubation.           Diagnosis:   Major depression (F33.2)         Pause for the Cause:     Right patient Yes   Right procedure/laterality settings: Yes          Intra-Procedure Documentation:       ECT #: 1   Treatment number this series: 1   Total treatment number: 1     Type of ECT:  Right, unilateral ultrabrief    ECT Medications:    Bicitra: 30cc  Zofran: 4mg  Next Tx add:  Toradol 30mg for HA  Etomidate: 16mg  Succinyl Choline: 100mg     ECT Strip Summary:   Energy Level: 40 percent  Motor Seizure Duration: 56 seconds  EEG Seizure Duration: 56 seconds    Complications: No    Plan:   Next ECT 7/17/19    Heber Lemus MD

## 2017-07-17 NOTE — PROGRESS NOTES
INITIAL OT NOTE:   Pt attended 1.5 of 3.0 scheduled OT sessions since admission date.  Pt demonstrated poor focus and was often observed staring off into space.  Pt reports having ECT today and shared this information with a peer.  Complained of a headache which she attributed to the ECT and will check in with her nurse.  Pt.was cooperative and pleasant throughout session.   Pt. will be given a Self Assessment form when able to demonstrate improved attention.  O.T. Staff will explain the value of including them in their treatment plan and offer options to meet their needs and identify goals. Initial Assessment will follow after further observations.

## 2017-07-18 ENCOUNTER — ANESTHESIA EVENT (OUTPATIENT)
Dept: SURGERY | Facility: CLINIC | Age: 32
End: 2017-07-18

## 2017-07-18 PROCEDURE — 25000132 ZZH RX MED GY IP 250 OP 250 PS 637: Performed by: PSYCHIATRY & NEUROLOGY

## 2017-07-18 PROCEDURE — 90853 GROUP PSYCHOTHERAPY: CPT

## 2017-07-18 PROCEDURE — 12400007 ZZH R&B MH INTERMEDIATE UMMC

## 2017-07-18 PROCEDURE — 97150 GROUP THERAPEUTIC PROCEDURES: CPT | Mod: GO

## 2017-07-18 RX ORDER — IBUPROFEN 800 MG/1
800 TABLET, FILM COATED ORAL 3 TIMES DAILY PRN
Status: DISCONTINUED | OUTPATIENT
Start: 2017-07-18 | End: 2017-07-28 | Stop reason: HOSPADM

## 2017-07-18 RX ADMIN — PANTOPRAZOLE SODIUM 40 MG: 40 TABLET, DELAYED RELEASE ORAL at 07:54

## 2017-07-18 RX ADMIN — CLONAZEPAM 0.5 MG: 0.5 TABLET ORAL at 07:53

## 2017-07-18 RX ADMIN — LEVOMILNACIPRAN HYDROCHLORIDE 40 MG: 40 CAPSULE, EXTENDED RELEASE ORAL at 07:52

## 2017-07-18 RX ADMIN — TRAZODONE HYDROCHLORIDE 100 MG: 100 TABLET ORAL at 20:03

## 2017-07-18 RX ADMIN — ACETAMINOPHEN 650 MG: 325 TABLET, FILM COATED ORAL at 07:53

## 2017-07-18 RX ADMIN — VENLAFAXINE HYDROCHLORIDE 75 MG: 75 TABLET, EXTENDED RELEASE ORAL at 07:53

## 2017-07-18 RX ADMIN — ACETAMINOPHEN 650 MG: 325 TABLET, FILM COATED ORAL at 11:20

## 2017-07-18 RX ADMIN — FLUTICASONE FUROATE AND VILANTEROL TRIFENATATE 1 PUFF: 200; 25 POWDER RESPIRATORY (INHALATION) at 07:53

## 2017-07-18 RX ADMIN — CHOLECALCIFEROL CAP 125 MCG (5000 UNIT) 5000 UNITS: 125 CAP at 07:53

## 2017-07-18 RX ADMIN — FLUTICASONE PROPIONATE 2 SPRAY: 50 SPRAY, METERED NASAL at 07:53

## 2017-07-18 RX ADMIN — CLONAZEPAM 0.5 MG: 0.5 TABLET ORAL at 13:32

## 2017-07-18 RX ADMIN — NICOTINE 1 PATCH: 14 PATCH, EXTENDED RELEASE TRANSDERMAL at 07:52

## 2017-07-18 RX ADMIN — IBUPROFEN 800 MG: 800 TABLET ORAL at 17:27

## 2017-07-18 RX ADMIN — NAPROXEN 500 MG: 500 TABLET ORAL at 07:53

## 2017-07-18 RX ADMIN — MONTELUKAST SODIUM 10 MG: 10 TABLET, FILM COATED ORAL at 20:03

## 2017-07-18 ASSESSMENT — ACTIVITIES OF DAILY LIVING (ADL)
DRESS: SCRUBS (BEHAVIORAL HEALTH);INDEPENDENT
ORAL_HYGIENE: INDEPENDENT
GROOMING: HANDWASHING;INDEPENDENT
DRESS: INDEPENDENT
GROOMING: INDEPENDENT
ORAL_HYGIENE: INDEPENDENT
LAUNDRY: WITH SUPERVISION

## 2017-07-18 NOTE — PROGRESS NOTES
Pt awake entire shift.  Pt attended groups, ate meals, and social upon approach.  Pt reports to improved mood and energy.  Pt appetite increased.  Pt positive outcome from first ECT Tx.  ECT TX #2 tomorrow morning.  Pleasant and cooperative.       07/18/17 1412   Behavioral Health   Hallucinations denies / not responding to hallucinations   Thinking intact   Orientation person: oriented;place: oriented;date: oriented;time: oriented   Memory baseline memory   Insight insight appropriate to situation   Judgement intact   Eye Contact at examiner   Affect other (see comments)  (brighter)   Mood other (see comments)  (improved)   Physical Appearance/Attire attire appropriate to age and situation   Hygiene well groomed   Suicidality other (see comments)  (denies)   Activity other (see comment)  (out in milieu; )   Speech clear;coherent   Psychomotor / Gait balanced;steady   Sleep/Rest/Relaxation   Day/Evening Time Hours up all shift   Coping/Psychosocial   Verbalized Emotional State acceptance;depression;hopefulness;relief   Plan Of Care Reviewed With patient   Psycho Education   Type of Intervention 1:1 intervention   Response participates, initiates socially appropriate   Hours 0.5   Activities of Daily Living   Hygiene/Grooming handwashing;independent   Oral Hygiene independent   Dress scrubs (behavioral health);independent   Activity   Activity Level of Assistance independent   Behavioral Health Interventions   Depression provide emotional support;assist with developing and utilizing healthy coping strategies;assess patient response to medication   Social and Therapeutic Interventions (Depression) encourage socialization with peers;encourage participation in therapeutic groups and milieu activities

## 2017-07-18 NOTE — PROGRESS NOTES
Pt complaining of generalized body pain today after ECT yesterday. She is requesting muscle relaxant available for ECT days on the unit.     Update:Dr Lemus updated in pain status. Changed naproxen to IBU. Pt stated that she will discuss with Dr Lemus in the morning.

## 2017-07-18 NOTE — PROGRESS NOTES
INITIAL ASSESSMENT:      07/17/17 1600   General Information   Date Initially Attended OT 07/17/17   Clinical Impression   Affect Appropriate to situation   Orientation Oriented to person, place and time   Appearance and ADLs General cleanliness observed in most areas   Attention to Internal Stimuli No observed signs   Interaction Skills Interacts appropriately with staff;Initiates appropriately with staff;Interacts appropriately with peers;Initiates appropriately with peers   Ability to Communicate Needs Independent   Verbal Content Clear;Appropriate to topic   Ability to Maintain Boundaries Maintains appropriate physical boundaries;Maintains appropriate verbal boundaries   Participation Independently participates   Concentration Concentrates 50 minutes   Ability to Concentrate With structure   Follows and Comprehends Directions Independently follows 2 step verbal directions   Memory Delayed and immediate recall intact   Organization Independently organizes medium tasks   Decision Making Independent   Planning and Problem Solving Independently plans ahead   Ability to Apply and Learn Concepts Applies within group structure   Frustrations / Stress Tolerance Needs further assessment   Level of Insight Some insight   Self Esteem Accepts positive feedback;Can identify positives   Social Supports Identifies utilizing supports   General Observation/Plan   General Observations/Plan (See Note for Observations and Plan. )

## 2017-07-18 NOTE — PLAN OF CARE
Problem: Depressive Symptoms  Goal: Depressive Symptoms  ..Pt. Reports absence of suicidal ideation, self-injurious behavior. Pt. Attends and participates in groups. Pt. Engages in 1:1 with staff. Pt. Reports increase in mood and affect. Pt. s appetite is appropriate to current medical status. Pt. Reports sleep is adequate. Pt. Accepts medications without difficulty. Pt. s thoughts appear clear, reality oriented. Pt. s behavior is appropriate with interactions of staff and other pts. ..Pt. Will report side affects of ECT, ie. Confusion, headache, body aches. Pt. Will engage with staff to evaluate any changes in pt. s emotional, physical, and/or mental status.   Outcome: Improving  Patient has been present in the milieu for several hours this evening, substantially more than on the past few days since admission.  She feels less homeless, although suicidal ideation is still present.  Range of affect is greater and she exhibits sense of humor.  PRN Naproxen 225 mg given for post- ECT headache and muscle stiffness with only partial relief.  New order obtained for Naproxen 550 mg q 12 hrs PRN.

## 2017-07-18 NOTE — PROGRESS NOTES
INITIAL ASSESSMENT:   Pt has attended scheduled OT sessions on [facility] X 2.  Pt. was given and completed a written Self Assessment form.  OT staff reviewed with patient and explained the value of having them involved in their treatment plan, and provided options to meet current needs/self-identified goals. Pt. Identified the following priority goals: identify and express feelings in a better way; work on accepting my diagnosis; improve and resume functioning daily life.  On 7/17 Pt attended after ECT and was lethargic.  She demonstrated poor focus and worked at a very slowed pace.  Pt attended on 7/18 and was very alert and organized in structured task.  Pt chose an unfamiliar task and efforts were organized and creative.  Pt accepted compliments on her work.  Affect was brighter and observed to engage others in a friendly manner.  Pt.was cooperative and pleasant throughout session. PLAN:  Encourage feelings identification and expression in healthy ways. Pt. Will engage in goal directed tasks to enhance concentration, organization, and problem solving.  Pt. Will explore and practice coping skills to reduce stress in daily life. Encourage attendance and participation in scheduled Occupational Therapy sessions.  Continue to assess and document progress.

## 2017-07-19 ENCOUNTER — SURGERY (OUTPATIENT)
Age: 32
End: 2017-07-19

## 2017-07-19 ENCOUNTER — ANESTHESIA (OUTPATIENT)
Dept: SURGERY | Facility: CLINIC | Age: 32
End: 2017-07-19

## 2017-07-19 PROCEDURE — 90870 ELECTROCONVULSIVE THERAPY: CPT

## 2017-07-19 PROCEDURE — 71000014 ZZH RECOVERY PHASE 1 LEVEL 2 FIRST HR

## 2017-07-19 PROCEDURE — 37000008 ZZH ANESTHESIA TECHNICAL FEE, 1ST 30 MIN

## 2017-07-19 PROCEDURE — 40000170 ZZH STATISTIC PRE-PROCEDURE ASSESSMENT II

## 2017-07-19 PROCEDURE — 25000132 ZZH RX MED GY IP 250 OP 250 PS 637: Performed by: PSYCHIATRY & NEUROLOGY

## 2017-07-19 PROCEDURE — 25000125 ZZHC RX 250: Performed by: ANESTHESIOLOGY

## 2017-07-19 PROCEDURE — 94640 AIRWAY INHALATION TREATMENT: CPT

## 2017-07-19 PROCEDURE — 25000128 H RX IP 250 OP 636: Performed by: PSYCHIATRY & NEUROLOGY

## 2017-07-19 PROCEDURE — 25000128 H RX IP 250 OP 636: Performed by: NURSE ANESTHETIST, CERTIFIED REGISTERED

## 2017-07-19 PROCEDURE — 25000125 ZZHC RX 250: Performed by: PSYCHIATRY & NEUROLOGY

## 2017-07-19 PROCEDURE — 40000275 ZZH STATISTIC RCP TIME EA 10 MIN

## 2017-07-19 PROCEDURE — 25000125 ZZHC RX 250: Performed by: NURSE ANESTHETIST, CERTIFIED REGISTERED

## 2017-07-19 PROCEDURE — 12400007 ZZH R&B MH INTERMEDIATE UMMC

## 2017-07-19 RX ORDER — ONDANSETRON 2 MG/ML
4 INJECTION INTRAMUSCULAR; INTRAVENOUS
Status: COMPLETED | OUTPATIENT
Start: 2017-07-19 | End: 2017-07-19

## 2017-07-19 RX ORDER — TRAMADOL HYDROCHLORIDE 50 MG/1
50 TABLET ORAL ONCE
Status: COMPLETED | OUTPATIENT
Start: 2017-07-19 | End: 2017-07-19

## 2017-07-19 RX ORDER — LIDOCAINE HYDROCHLORIDE 20 MG/ML
40 INJECTION, SOLUTION EPIDURAL; INFILTRATION; INTRACAUDAL; PERINEURAL
Status: DISPENSED | OUTPATIENT
Start: 2017-07-19 | End: 2017-07-19

## 2017-07-19 RX ORDER — ALBUTEROL SULFATE 0.83 MG/ML
2.5 SOLUTION RESPIRATORY (INHALATION)
Status: CANCELLED
Start: 2017-07-19 | End: 2017-07-19

## 2017-07-19 RX ORDER — SODIUM CHLORIDE, SODIUM LACTATE, POTASSIUM CHLORIDE, CALCIUM CHLORIDE 600; 310; 30; 20 MG/100ML; MG/100ML; MG/100ML; MG/100ML
INJECTION, SOLUTION INTRAVENOUS CONTINUOUS PRN
Status: DISCONTINUED | OUTPATIENT
Start: 2017-07-19 | End: 2017-07-19

## 2017-07-19 RX ORDER — IPRATROPIUM BROMIDE AND ALBUTEROL SULFATE 2.5; .5 MG/3ML; MG/3ML
3 SOLUTION RESPIRATORY (INHALATION) ONCE
Status: COMPLETED | OUTPATIENT
Start: 2017-07-19 | End: 2017-07-19

## 2017-07-19 RX ORDER — IPRATROPIUM BROMIDE AND ALBUTEROL SULFATE 2.5; .5 MG/3ML; MG/3ML
3 SOLUTION RESPIRATORY (INHALATION)
Status: CANCELLED
Start: 2017-07-20 | End: 2017-07-20

## 2017-07-19 RX ORDER — ONDANSETRON 2 MG/ML
4 INJECTION INTRAMUSCULAR; INTRAVENOUS
Status: ACTIVE | OUTPATIENT
Start: 2017-07-19 | End: 2017-07-19

## 2017-07-19 RX ORDER — ALBUTEROL SULFATE 0.83 MG/ML
2.5 SOLUTION RESPIRATORY (INHALATION)
Status: CANCELLED
Start: 2017-07-20 | End: 2017-07-20

## 2017-07-19 RX ORDER — KETOROLAC TROMETHAMINE 30 MG/ML
30 INJECTION, SOLUTION INTRAMUSCULAR; INTRAVENOUS
Status: COMPLETED | OUTPATIENT
Start: 2017-07-19 | End: 2017-07-19

## 2017-07-19 RX ORDER — ETOMIDATE 2 MG/ML
INJECTION INTRAVENOUS PRN
Status: DISCONTINUED | OUTPATIENT
Start: 2017-07-19 | End: 2017-07-19

## 2017-07-19 RX ORDER — CITRIC ACID/SODIUM CITRATE 334-500MG
30 SOLUTION, ORAL ORAL
Status: COMPLETED | OUTPATIENT
Start: 2017-07-19 | End: 2017-07-19

## 2017-07-19 RX ORDER — LIDOCAINE HYDROCHLORIDE 20 MG/ML
40 INJECTION, SOLUTION EPIDURAL; INFILTRATION; INTRACAUDAL; PERINEURAL
Status: CANCELLED
Start: 2017-07-19 | End: 2017-07-19

## 2017-07-19 RX ORDER — CITRIC ACID/SODIUM CITRATE 334-500MG
30 SOLUTION, ORAL ORAL
Status: CANCELLED
Start: 2017-07-19 | End: 2017-07-19

## 2017-07-19 RX ORDER — KETOROLAC TROMETHAMINE 30 MG/ML
30 INJECTION, SOLUTION INTRAMUSCULAR; INTRAVENOUS
Status: CANCELLED
Start: 2017-07-19 | End: 2017-07-19

## 2017-07-19 RX ORDER — IPRATROPIUM BROMIDE AND ALBUTEROL SULFATE 2.5; .5 MG/3ML; MG/3ML
3 SOLUTION RESPIRATORY (INHALATION)
Status: CANCELLED
Start: 2017-07-19 | End: 2017-07-19

## 2017-07-19 RX ORDER — LIDOCAINE HYDROCHLORIDE 20 MG/ML
INJECTION, SOLUTION INFILTRATION; PERINEURAL PRN
Status: DISCONTINUED | OUTPATIENT
Start: 2017-07-19 | End: 2017-07-19

## 2017-07-19 RX ADMIN — ONDANSETRON 4 MG: 2 INJECTION INTRAMUSCULAR; INTRAVENOUS at 06:57

## 2017-07-19 RX ADMIN — NICOTINE 1 PATCH: 14 PATCH, EXTENDED RELEASE TRANSDERMAL at 09:03

## 2017-07-19 RX ADMIN — ETOMIDATE 16 MG: 2 INJECTION INTRAVENOUS at 07:20

## 2017-07-19 RX ADMIN — IPRATROPIUM BROMIDE AND ALBUTEROL SULFATE 3 ML: .5; 3 SOLUTION RESPIRATORY (INHALATION) at 07:05

## 2017-07-19 RX ADMIN — MIDAZOLAM HYDROCHLORIDE 1 MG: 1 INJECTION, SOLUTION INTRAMUSCULAR; INTRAVENOUS at 07:34

## 2017-07-19 RX ADMIN — KETOROLAC TROMETHAMINE 30 MG: 30 INJECTION, SOLUTION INTRAMUSCULAR at 06:58

## 2017-07-19 RX ADMIN — CLONAZEPAM 0.5 MG: 0.5 TABLET ORAL at 09:01

## 2017-07-19 RX ADMIN — CLONAZEPAM 0.5 MG: 0.5 TABLET ORAL at 20:09

## 2017-07-19 RX ADMIN — IPRATROPIUM BROMIDE AND ALBUTEROL SULFATE 3 ML: .5; 3 SOLUTION RESPIRATORY (INHALATION) at 05:47

## 2017-07-19 RX ADMIN — IBUPROFEN 800 MG: 800 TABLET ORAL at 14:14

## 2017-07-19 RX ADMIN — MIDAZOLAM HYDROCHLORIDE 1 MG: 1 INJECTION, SOLUTION INTRAMUSCULAR; INTRAVENOUS at 07:30

## 2017-07-19 RX ADMIN — TRAZODONE HYDROCHLORIDE 100 MG: 100 TABLET ORAL at 20:09

## 2017-07-19 RX ADMIN — VENLAFAXINE HYDROCHLORIDE 75 MG: 75 TABLET, EXTENDED RELEASE ORAL at 09:03

## 2017-07-19 RX ADMIN — CHOLECALCIFEROL CAP 125 MCG (5000 UNIT) 5000 UNITS: 125 CAP at 09:01

## 2017-07-19 RX ADMIN — MONTELUKAST SODIUM 10 MG: 10 TABLET, FILM COATED ORAL at 20:09

## 2017-07-19 RX ADMIN — Medication 100 MG: at 07:19

## 2017-07-19 RX ADMIN — SODIUM CITRATE AND CITRIC ACID MONOHYDRATE 30 ML: 500; 334 SOLUTION ORAL at 06:58

## 2017-07-19 RX ADMIN — FLUTICASONE FUROATE AND VILANTEROL TRIFENATATE 1 PUFF: 200; 25 POWDER RESPIRATORY (INHALATION) at 05:48

## 2017-07-19 RX ADMIN — LEVOMILNACIPRAN HYDROCHLORIDE 40 MG: 40 CAPSULE, EXTENDED RELEASE ORAL at 09:02

## 2017-07-19 RX ADMIN — CLONAZEPAM 0.5 MG: 0.5 TABLET ORAL at 14:14

## 2017-07-19 RX ADMIN — LIDOCAINE HYDROCHLORIDE 40 MG: 20 INJECTION, SOLUTION INFILTRATION; PERINEURAL at 07:20

## 2017-07-19 RX ADMIN — SODIUM CHLORIDE, POTASSIUM CHLORIDE, SODIUM LACTATE AND CALCIUM CHLORIDE: 600; 310; 30; 20 INJECTION, SOLUTION INTRAVENOUS at 07:17

## 2017-07-19 RX ADMIN — PANTOPRAZOLE SODIUM 40 MG: 40 TABLET, DELAYED RELEASE ORAL at 05:47

## 2017-07-19 RX ADMIN — TRAMADOL HYDROCHLORIDE 50 MG: 50 TABLET, COATED ORAL at 20:08

## 2017-07-19 ASSESSMENT — LIFESTYLE VARIABLES: TOBACCO_USE: 1

## 2017-07-19 ASSESSMENT — ENCOUNTER SYMPTOMS: SEIZURES: 0

## 2017-07-19 ASSESSMENT — ACTIVITIES OF DAILY LIVING (ADL)
ORAL_HYGIENE: INDEPENDENT
GROOMING: INDEPENDENT
LAUNDRY: WITH SUPERVISION
DRESS: INDEPENDENT
DRESS: INDEPENDENT
GROOMING: INDEPENDENT
ORAL_HYGIENE: INDEPENDENT

## 2017-07-19 NOTE — PROGRESS NOTES
[facility]   Dr. Lemus's Psychiatric Progress Note  2017      Patient:  Nuha Lees   Medical Record Number:  [id number]  :  1985          Interim History:   The patient's care was discussed with the treatment team and chart notes were reviewed.  Able to watch a whole movie.  Concentration is a little better.  Mood is a little better.      Psychiatric ROS:  Mood: a little less depressed;  anxious  Sleep: poor with middle insomnia and bad dreams  Appetite:  better  Eating:  better  Energy Level: a little better  Concentration/Memory: a little better  Suicidal Thoughts:Yes ;  Still feels suicidal but feels safe on the unit  Homicidal Thoughts:No  Psychotic Symptoms: No  Medication Side Effects:  Headaches  Medication Compliance:Yes   Physical Complaints:  positive for asthma;  Muscle aches         Medications:     Current Facility-Administered Medications   Medication     ketorolac (TORADOL) injection 30 mg     ondansetron (ZOFRAN) injection 4 mg     sodium citrate-citric acid (BICITRA) solution 30 mL     ondansetron (ZOFRAN) injection 4 mg     [Auto Hold] ibuprofen (ADVIL/MOTRIN) tablet 800 mg     [Auto Hold] traZODone (DESYREL) tablet 100 mg     [Auto Hold] albuterol (PROAIR HFA/PROVENTIL HFA/VENTOLIN HFA) Inhaler 2 puff     [Auto Hold] clonazePAM (klonoPIN) tablet 0.5 mg     [Auto Hold] fluticasone-vilanterol (BREO ELLIPTA) 200-25 MCG/INH oral inhaler 1 puff     [Auto Hold] hydrOXYzine (ATARAX) tablet 25-50 mg     [Auto Hold] ipratropium - albuterol 0.5 mg/2.5 mg/3 mL (DUONEB) neb solution 3 mL     [Auto Hold] montelukast (SINGULAIR) tablet 10 mg     [Auto Hold] nicotine (NICODERM CQ) 14 MG/24HR 24 hr patch 1 patch     [Auto Hold] pantoprazole (PROTONIX) EC tablet 40 mg     [Auto Hold] cholecalciferol (vitamin D3) capsule CAPS 5,000 Units     [Auto Hold] acetaminophen (TYLENOL) tablet 650 mg     [Auto Hold] alum & mag hydroxide-simethicone (MYLANTA ES/MAALOX   ES) suspension 30 mL     [Auto Hold] magnesium hydroxide (MILK OF MAGNESIA) suspension 30 mL     [Auto Hold] nicotine patch REMOVAL     [Auto Hold] nicotine Patch in Place     [Auto Hold] venlafaxine (EFFEXOR-ER) 24 hr tablet 75 mg     [Auto Hold] venlafaxine (EFFEXOR-ER) 24 hr tablet 37.5 mg     [Auto Hold] levomilnacipran (FETZIMA ER) 24 hr capsule 40 mg     [Auto Hold] levomilnacipran (FETZIMA ER) 24 hr capsule 80 mg     [Auto Hold] fluticasone (FLONASE) 50 MCG/ACT spray 2 spray             Allergies:     Allergies   Allergen Reactions     Codeine Sulfate      Sulfa Drugs      Trintellix [Vortioxetine]             Psychiatric Examination:   /59 (BP Location: Right arm)  Pulse 66  Temp 98.1  F (36.7  C) (Oral)  Resp 16  Wt 80.3 kg (177 lb 1.6 oz)  SpO2 98%  BMI 29.51 kg/m2  Weight is 177 lbs 1.6 oz  Body mass index is 29.51 kg/(m^2).    Appearance:  poorly groomed  Attitude:  cooperative  Eye Contact:  good  Mood:  A little less depressed and anxious  Affect:  mood congruent  Speech:  clear, coherent  Psychomotor Behavior:  no evidence of tardive dyskinesia, dystonia, or tics  Throught Process:  logical  Associations:  no loose associations  Thought Content:  no evidence of psychotic thought and active suicidal ideation present;  Feels safe on the unit and is able to contract for safety.    Insight:  fair  Judgement:  intact  Oriented to:  time, person, and place  Attention Span and Concentration:  intact  Recent and Remote Memory:  intact  Gait:Normal    Risk/Potential for Dangerousness:  Multiple Active Diagnoses:HIGH  Self Care:HIGH  Suicide:HIGH  Assault:LOW  Self Injurious Behaviors:LOW  Inappropriate Sexual Behavior:LOW         Labs:        Recent Results (from the past 1008 hour(s))   EKG 12-lead, complete    Collection Time: 07/13/17  4:51 PM   Result Value Ref Range    Interpretation ECG Click View Image link to view waveform and result    Potassium    Collection Time: 07/14/17  7:51 AM    Result Value Ref Range    Potassium 3.7 3.4 - 5.3 mmol/L   Hemoglobin    Collection Time: 07/14/17  7:51 AM   Result Value Ref Range    Hemoglobin 14.1 11.7 - 15.7 g/dL   CBC with platelets    Collection Time: 07/17/17  6:09 AM   Result Value Ref Range    WBC 9.2 4.0 - 11.0 10e9/L    RBC Count 5.31 (H) 3.8 - 5.2 10e12/L    Hemoglobin 15.5 11.7 - 15.7 g/dL    Hematocrit 46.9 35.0 - 47.0 %    MCV 88 78 - 100 fl    MCH 29.2 26.5 - 33.0 pg    MCHC 33.0 31.5 - 36.5 g/dL    RDW 12.4 10.0 - 15.0 %    Platelet Count 229 150 - 450 10e9/L   Basic metabolic panel    Collection Time: 07/17/17  6:09 AM   Result Value Ref Range    Sodium 142 133 - 144 mmol/L    Potassium 3.6 3.4 - 5.3 mmol/L    Chloride 107 94 - 109 mmol/L    Carbon Dioxide 24 20 - 32 mmol/L    Anion Gap 11 3 - 14 mmol/L    Glucose 97 70 - 99 mg/dL    Urea Nitrogen 13 7 - 30 mg/dL    Creatinine 1.01 0.52 - 1.04 mg/dL    GFR Estimate 63 >60 mL/min/1.7m2    GFR Estimate If Black 76 >60 mL/min/1.7m2    Calcium 9.1 8.5 - 10.1 mg/dL         Impression:   This is a 31 year old female admitted with worsening depression and suicidal ideation and inability to care for herself.  She's continuing ECT.  Mood is a little better.           DIagnoses:      Axis I.  Major depressive disorder, recurrent.  Panic disorder with agoraphobia.  Generalized anxiety disorder.       Axis II. Deferred.       Axis III.  Asthma.             Plan:     Continue tapering off Effexor XR.    Started and titrating Fetzima.  She completed cytochrome P450 testing last month.  Continue ECT series.   ECT treatment #2 is today.   Treatment #3 will be 7/21/17.    Expect stabilization and discharge back home with her family.   Patient needs to be in the hospital due to here severe depression and suicidality.      Heber Lemus MD

## 2017-07-19 NOTE — ANESTHESIA PREPROCEDURE EVALUATION
Anesthesia Evaluation     . Pt has had prior anesthetic. Type: General    History of anesthetic complications    Aspiration and severe bronchospasm requiring intubation      ROS/MED HX    ENT/Pulmonary:     (+)tobacco use, Current use Intermittent asthma Treatment: Inhaler prn,  , . .   (-) sleep apnea and recent URI   Neurologic:      (-) seizures   Cardiovascular:  - neg cardiovascular ROS       METS/Exercise Tolerance:     Hematologic:         Musculoskeletal:  - neg musculoskeletal ROS       GI/Hepatic:  - neg GI/hepatic ROS       Renal/Genitourinary:  - ROS Renal section negative       Endo:  - neg endo ROS       Psychiatric:     (+) psychiatric history       Infectious Disease:  - neg infectious disease ROS       Malignancy:      - no malignancy   Other:    - neg other ROS                 Physical Exam  Normal systems: dental    Airway   Mallampati: I  TM distance: >3 FB  Neck ROM: full    Dental     Cardiovascular       Pulmonary                         Anesthesia Plan      History & Physical Review  History and physical reviewed and following examination; no interval change.    ASA Status:  2 .    NPO Status:  > 6 hours    Plan for General with Intravenous induction.          Postoperative Care      Consents  Anesthetic plan, risks, benefits and alternatives discussed with:  Patient..

## 2017-07-19 NOTE — OR NURSING
Patient brought from [facility].  Patient moved into pacu adult bay 28.

## 2017-07-19 NOTE — PROGRESS NOTES
Pt returned from ECT, slightly unsteady on her feet. Drank fluids and was encouraged to eat some breakfast but declined. Pt denied any nausea or headache.  called and ECt on Friday will be done down in ECT. Pt is to have inhalers before ECT and the Nebs will be done down there. Have pt ready by 0645.

## 2017-07-19 NOTE — ANESTHESIA CARE TRANSFER NOTE
Patient: Nuha Lees    Procedure(s):  Electroconvulsive Therapy with Dr Lemus     Diagnosis: Severe recurrent major depression without psychotic features (H) [F33.2]  Diagnosis Additional Information: No value filed.    Anesthesia Type:   General, RSI, ETT     Note:  Airway :Face Mask  Patient transferred to:PACU  Comments: Spontaneous respirations and patent protected airway. Purposeful movement after versed. VSS. Report given to RN      Vitals: (Last set prior to Anesthesia Care Transfer)    CRNA VITALS  7/19/2017 0709 - 7/19/2017 0739      7/19/2017             Pulse: 91    Ht Rate: 87    SpO2: 95 %    Resp Rate (set): 10                Electronically Signed By: NATHAN Johnston CRNA  July 19, 2017  7:39 AM

## 2017-07-19 NOTE — PROCEDURES
Procedure/Surgery Information   [facility], [place]    Bedside Procedure Note  Date of Service (when I performed the procedure): 07/19/2017    Nuha Lees is a 31 year old female patient.  No diagnosis found.  Past Medical History:   Diagnosis Date     Asthma      Temp: 98.1  F (36.7  C) Temp src: Oral BP: 101/59 Pulse: 66   Resp: 16 SpO2: 98 % O2 Device: None (Room air)      Procedures     Heber Lemus     [facility], [place]   ECT Procedure Note  07/19/2017    Nuha Lees [id number]   31 year old 1985     Patient Status: Inpatient    Is this the first in a series of 12 treatments?  No    History and Physical: Reviewed in medical record    Consent: Informed consent was signed by: patient    Date Consent Signed: 7/17/17      Allergies   Allergen Reactions     Codeine Sulfate      Sulfa Drugs      Trintellix [Vortioxetine]        Weight:  177 lbs 1.6 oz    Patient Preparations: Glasses/Contacts removed         Indications for ECT:   Medications ineffective and Imminent suicide risk         Clinical Narrative:   Patient was admitted with worsening depression and suicidal ideation.  NPO after 2400.  Alert and Oriented x 3. Mood is better than Monday.  Had muscles aches after last ECT.          Diagnosis:   Major depression (F33.2)         Pause for the Cause:     Right patient Yes   Right procedure/laterality settings: Yes          Intra-Procedure Documentation:       ECT #: 2   Treatment number this series: 2   Total treatment number: 2     Type of ECT:  Right, unilateral ultrabrief    ECT Medications:    Bicitra: 30cc  Zofran: 4mg  Toradol 30mg for HA  Etomidate: 16mg  Succinyl Choline: 100mg   Versed:  2mg for post ECT agitation    ECT Strip Summary:   Energy Level: 40 percent  Motor Seizure Duration:  46 seconds  EEG Seizure Duration:  59 seconds    Complications: No    Plan:   Next ECT 7/21/19.  We will do that down in ECT suite in [place]  building rather than in [facility].  Have her ready to go at 6:45.      Heber Lemus MD

## 2017-07-19 NOTE — PLAN OF CARE
Problem: General Plan of Care (Inpatient Behavioral)  Goal: Individualization/Patient Specific Goal (IP Behavioral)  ..Illness Management Recovery model: Objectives    Patient will identify reason(s) for hospitalization from their perspective.  Patient will identify a minimum of three goals for discharge.  Patient will identify a minimum of three triggers that may increase their symptoms.  Patient will identify a minimum of three coping skills they can do to stay well.   Patient will identify their support system to demonstrate readiness for discharge.   Outcome: No Change  Illness Management Recovery model:  Warning Signs.     Patient identified the following early warning signs which may indicate that a relapse of their illness is startin. Poor sleep  2. Mood getting worse  3. Feelings of SI

## 2017-07-19 NOTE — OR NURSING
Pt tolerated ECT well. Recovery uneventful. Report given to [facility] nurse. Pt transported to [facility] with [facility].

## 2017-07-19 NOTE — PLAN OF CARE
Problem: Depressive Symptoms  Goal: Depressive Symptoms  ..Pt. Reports absence of suicidal ideation, self-injurious behavior. Pt. Attends and participates in groups. Pt. Engages in 1:1 with staff. Pt. Reports increase in mood and affect. Pt. s appetite is appropriate to current medical status. Pt. Reports sleep is adequate. Pt. Accepts medications without difficulty. Pt. s thoughts appear clear, reality oriented. Pt. s behavior is appropriate with interactions of staff and other pts. ..Pt. Will report side affects of ECT, ie. Confusion, headache, body aches. Pt. Will engage with staff to evaluate any changes in pt. s emotional, physical, and/or mental status.   Outcome: No Change  Pt denies suicidal ideation or self injury. Attended group. Pt was willing to talk with staff 1:1. Sleeping better 6 and 1/2 hours. Medication complaint. Social with peers and staff. Some mild confusion after ECT.

## 2017-07-19 NOTE — ANESTHESIA PREPROCEDURE EVALUATION
"Anesthesia Evaluation     . Pt has had prior anesthetic. Type: General    History of anesthetic complications    Aspirated, severe bronchospasm, required intubation for airway support.      ROS/MED HX    ENT/Pulmonary:       Neurologic:       Cardiovascular:         METS/Exercise Tolerance:     Hematologic:         Musculoskeletal:         GI/Hepatic:         Renal/Genitourinary:         Endo:         Psychiatric:         Infectious Disease:         Malignancy:         Other:                     Physical Exam  Normal systems: cardiovascular and dental    Airway   Mallampati: I  TM distance: >3 FB  Neck ROM: full    Dental     Cardiovascular   Rhythm and rate: regular and normal      Pulmonary (+) decreased breath sounds                           Anesthesia Plan      History & Physical Review  History and physical reviewed and following examination; no interval change.    ASA Status:  2 .    NPO Status:  > 6 hours    Plan for General, RSI and ETT with Intravenous induction. Maintenance will be Other.      Pre-op duoneb  Bicitra prior to procedure  Dilute epinephrine available in room  Preoxygenation, head of bed elevated.   RSI with etomidate (16 mg) and succinycholine (100mg).  CMAC and ETT for airway protection.   Bite block      Postoperative Care      Consents  Anesthetic plan, risks, benefits and alternatives discussed with:  Patient..        ANESTHESIA PREOP EVALUATION    PROCEDURE: Procedure(s):  Electroconvulsive Therapy with Dr Lemus     HPI: Nuha Lees is a 31 year old female who presents for above procedure 2/2 major depression.     \"Nuha Lees is a 31 year old female who has medical history notable for asthma and MDD who is currently admitted to the inpatient psychiatry [facility] for her mood symptoms. She has previously undergone ECT for her MDD with some success. Anesthesiology was consulted today because at the time of her last ECT procedure she had some bilious emesis on induction with some " "mild aspiration. However, due to her significant asthma, she developed a reactive bronchospasm which required treatment with IM epi, steroid, albuterol, and nebulized epinephrine. The patient had an unremarkable cxr post procedure and an otherwise uneventful recovery from a pulmonary standpoint with wheezing resolved prior to discharge from the PACU.      Currently, she states she is feeling well from a pulmonary standpoint but does get significant shortness of breath with exertion and wheezing. She is currently on albuterol prn, duoneb prn, singulair daily, fluticasone-vilanterol (BREO) inhaled daily for her asthma which controls her symptoms at baseline with additional PRN inhalers based on exacerbations. At rest, she is denying any wheezing or shortness of breath.      She has had heartburn in the past but notes she has not had in a while and used to take prn medications for it (likely a H2 blocker based on the patient's description). She denies any recent reflux, nausea, vomiting, or any other similar symptoms. She has had no issues eating or drinking and denies any dysphagia. She is currently on protonix daily via her primary service.      From a mental health perspective she is symptomatic but stable at the time. She appears to be currently managed with clonazepam, venlafaxine, and trazodone scheduled with prn atarax.\"           PAST MEDICAL HISTORY:    Past Medical History:   Diagnosis Date     Asthma        PAST SURGICAL HISTORY:    Past Surgical History:   Procedure Laterality Date     KNEE SURGERY Right     Roller derby injury      REPAIR PTOSIS  10/5/2011    Procedure:REPAIR PTOSIS; Left Upper Lid Ptosis Repair; Surgeon:MONICA ANDREWS; Location:[facility]     TUBAL LIGATION  2016       PAST ANESTHESIA HISTORY:     No personal or family h/o anesthesia problems    SOCIAL HISTORY:       Social History   Substance Use Topics     Smoking status: Former Smoker     Smokeless tobacco: Not on file     Alcohol use Yes "       ALLERGIES:     Allergies   Allergen Reactions     Codeine Sulfate      Sulfa Drugs      Trintellix [Vortioxetine]        MEDICATIONS:     Prescriptions Prior to Admission   Medication Sig Dispense Refill Last Dose     hydrOXYzine (ATARAX) 25 MG tablet Take 1-2 tablets (25-50 mg) by mouth every 4 hours as needed for anxiety 30 tablet 0      albuterol (PROAIR HFA/PROVENTIL HFA/VENTOLIN HFA) 108 (90 BASE) MCG/ACT Inhaler Inhale 2 puffs into the lungs every 4 hours as needed for shortness of breath / dyspnea or wheezing        fluticasone-vilanterol (BREO ELLIPTA) 200-25 MCG/INH oral inhaler Inhale 1 puff into the lungs daily 1 Inhaler 0      ipratropium - albuterol 0.5 mg/2.5 mg/3 mL (DUONEB) 0.5-2.5 (3) MG/3ML neb solution Take 1 vial (3 mLs) by nebulization every 4 hours as needed for shortness of breath / dyspnea or wheezing 360 mL       traZODone (DESYREL) 50 MG tablet Take 1 tablet (50 mg) by mouth nightly as needed for sleep 15 tablet 0      venlafaxine (EFFEXOR-ER) 150 MG TB24 24 hr tablet Take 1 tablet (150 mg) by mouth daily (with breakfast) 30 each 0      polyethylene glycol (MIRALAX/GLYCOLAX) Packet Take 17 g by mouth daily as needed for constipation 7 packet 0      senna-docusate (SENOKOT-S;PERICOLACE) 8.6-50 MG per tablet Take 2 tablets by mouth 2 times daily as needed (constipation ) 20 tablet 0      pantoprazole (PROTONIX) 40 MG EC tablet Take 1 tablet (40 mg) by mouth every morning (before breakfast) 15 tablet 0      nicotine (NICODERM CQ) 14 MG/24HR 24 hr patch Place 1 patch onto the skin daily 14 patch 0      fluticasone (VERAMYST) 27.5 MCG/SPRAY spray Spray 2 sprays into both nostrils daily   5/16/2017 at Unknown time     montelukast (SINGULAIR) 10 MG tablet Take 10 mg by mouth At Bedtime   5/16/2017 at Unknown time     CLONAZEPAM PO Take 0.5 mg by mouth 3 times daily   Unknown at Unknown time     VITAMIN D, CHOLECALCIFEROL, PO Take 5,000 Units by mouth daily   5/17/2017 at Unknown time        No current outpatient prescriptions on file.       Current Facility-Administered Medications Ordered in Epic   Medication Dose Route Frequency Provider Last Rate Last Dose     ketorolac (TORADOL) injection 30 mg  30 mg Intravenous Once in ECT Heber Lemus MD         ondansetron (ZOFRAN) injection 4 mg  4 mg Intravenous Once in ECT Heber Lemus MD         sodium citrate-citric acid (BICITRA) solution 30 mL  30 mL Oral Once in ECT Heber Lemus MD         ondansetron (ZOFRAN) injection 4 mg  4 mg Intravenous Once in ECT Heber Lemus MD         [Auto Hold] ibuprofen (ADVIL/MOTRIN) tablet 800 mg  800 mg Oral TID PRN Heber Lemus MD   800 mg at 07/18/17 1727     [Auto Hold] traZODone (DESYREL) tablet 100 mg  100 mg Oral At Bedtime Brad Burrell MD   100 mg at 07/18/17 2003     [Auto Hold] albuterol (PROAIR HFA/PROVENTIL HFA/VENTOLIN HFA) Inhaler 2 puff  2 puff Inhalation Q4H PRN Heber Lemus MD   2 puff at 07/17/17 0640     [Auto Hold] clonazePAM (klonoPIN) tablet 0.5 mg  0.5 mg Oral TID Heber Lemus MD   0.5 mg at 07/18/17 1332     [Auto Hold] fluticasone-vilanterol (BREO ELLIPTA) 200-25 MCG/INH oral inhaler 1 puff  1 puff Inhalation Daily Heber Lemus MD   1 puff at 07/19/17 0548     [Auto Hold] hydrOXYzine (ATARAX) tablet 25-50 mg  25-50 mg Oral Q4H PRN Heber Lemus MD   50 mg at 07/17/17 1554     [Auto Hold] ipratropium - albuterol 0.5 mg/2.5 mg/3 mL (DUONEB) neb solution 3 mL  3 mL Nebulization Q4H PRN Heber Lemus MD   3 mL at 07/19/17 0547     [Auto Hold] montelukast (SINGULAIR) tablet 10 mg  10 mg Oral At Bedtime Heber Lemus MD   10 mg at 07/18/17 2003     [Auto Hold] nicotine (NICODERM CQ) 14 MG/24HR 24 hr patch 1 patch  1 patch Transdermal Daily Heber Lemus MD   1 patch at 07/18/17 0752     [Auto Hold] pantoprazole (PROTONIX) EC tablet 40 mg  40 mg Oral QAM AC Heber Lemus MD   40 mg at 07/19/17 0547     [Auto Hold] cholecalciferol (vitamin D3) capsule CAPS 5,000 Units   5,000 Units Oral Daily Heber Lemus MD   5,000 Units at 17 0753     [Auto Hold] acetaminophen (TYLENOL) tablet 650 mg  650 mg Oral Q4H PRN Heber Lemus MD   650 mg at 17 1120     [Auto Hold] alum & mag hydroxide-simethicone (MYLANTA ES/MAALOX  ES) suspension 30 mL  30 mL Oral Q4H PRN Heber Lemus MD         [Auto Hold] magnesium hydroxide (MILK OF MAGNESIA) suspension 30 mL  30 mL Oral At Bedtime PRN Heber Lemus MD         [Auto Hold] nicotine patch REMOVAL   Transdermal Daily Heber Lemus MD         [Auto Hold] nicotine Patch in Place   Transdermal Q8H Heber Lemus MD         [Auto Hold] venlafaxine (EFFEXOR-ER) 24 hr tablet 75 mg  75 mg Oral Daily with breakfast Heber Lemus MD   75 mg at 17 0753     [Auto Hold] venlafaxine (EFFEXOR-ER) 24 hr tablet 37.5 mg  37.5 mg Oral Daily with breakfast Heber Lemus MD         [Auto Hold] levomilnacipran (FETZIMA ER) 24 hr capsule 40 mg  40 mg Oral Daily Heber Lemus MD   40 mg at 17 0752     [Auto Hold] levomilnacipran (FETZIMA ER) 24 hr capsule 80 mg  80 mg Oral Daily Heber Lemus MD         [Auto Hold] fluticasone (FLONASE) 50 MCG/ACT spray 2 spray  2 spray Both Nostrils Daily Heber Lemus MD   2 spray at 17 0753     No current [facility]-ordered outpatient prescriptions on file.       PHYSICAL EXAM:    Vitals: T 98, P 116, BP 89/63, R 16, SpO2 98%, Weight   Wt Readings from Last 2 Encounters:   17 80.3 kg (177 lb 1.6 oz)   17 82.6 kg (182 lb)       See doc flowsheet    Temp: 36.7  C (98.1  F) Temp  Min: 36.4  C (97.5  F)  Max: 36.7  C (98.1  F)  Resp: 16 Resp  Min: 16  Max: 16  SpO2: 98 % SpO2  Min: 98 %  Max: 98 %  Pulse: 66 Pulse  Min: 66  Max: 66    No Data Recorded  BP: 101/59 Systolic (24hrs), Av , Min:101 , Max:101   Diastolic (24hrs), Av, Min:59, Max:59      NPO STATUS: see doc flowsheet    LABS:    BMP:  Recent Labs   Lab Test  17   0751  17   0533   NA   --   143   POTASSIUM  3.7  4.2    CHLORIDE   --   110*   CO2   --   28   BUN   --   17   CR   --   0.70   GLC   --   103*   NICHOLAS   --   8.5       LFTs:   Recent Labs   Lab Test  05/18/17   1700   PROTTOTAL  6.9   ALBUMIN  3.6   BILITOTAL  0.5   ALKPHOS  51   AST  15   ALT  22       CBC:   Recent Labs   Lab Test  07/14/17   0751  05/21/17   0533   WBC   --   18.5*   RBC   --   3.93   HGB  14.1  11.7   HCT   --   35.7   MCV   --   91   MCH   --   29.8   MCHC   --   32.8   RDW   --   12.7   PLT   --   208       Coags:  No results for input(s): INR, PTT, FIBR in the last 58555 hours.    Imaging:  No orders to display       Sven Mann MD  Anesthesiology Staff  Pager [phone]    7/16/2017  10:37 PM    Anesthesia Evaluation     .             ROS/MED HX    ENT/Pulmonary:     (+)tobacco use, Current use Intermittent asthma Treatment: Inhaler prn,  , . .   (-) sleep apnea and recent URI   Neurologic:      (-) seizures   Cardiovascular:  - neg cardiovascular ROS       METS/Exercise Tolerance:     Hematologic:         Musculoskeletal:  - neg musculoskeletal ROS       GI/Hepatic:  - neg GI/hepatic ROS       Renal/Genitourinary:  - ROS Renal section negative       Endo:  - neg endo ROS       Psychiatric:     (+) psychiatric history       Infectious Disease:  - neg infectious disease ROS       Malignancy:      - no malignancy   Other:    - neg other ROS                 Physical Exam  Normal systems: dental    Airway   Mallampati: I  TM distance: >3 FB  Neck ROM: full    Dental     Cardiovascular       Pulmonary                     Anesthesia Plan      History & Physical Review  History and physical reviewed and following examination; no interval change.    ASA Status:  2 .    NPO Status:  > 6 hours    Plan for General with Intravenous induction.          Postoperative Care      Consents  Anesthetic plan, risks, benefits and alternatives discussed with:  Patient..

## 2017-07-20 PROCEDURE — 25000132 ZZH RX MED GY IP 250 OP 250 PS 637: Performed by: PSYCHIATRY & NEUROLOGY

## 2017-07-20 PROCEDURE — 90853 GROUP PSYCHOTHERAPY: CPT

## 2017-07-20 PROCEDURE — 12400007 ZZH R&B MH INTERMEDIATE UMMC

## 2017-07-20 RX ORDER — VENLAFAXINE HYDROCHLORIDE 37.5 MG/1
37.5 TABLET, EXTENDED RELEASE ORAL
Status: DISCONTINUED | OUTPATIENT
Start: 2017-07-24 | End: 2017-07-28 | Stop reason: HOSPADM

## 2017-07-20 RX ADMIN — CLONAZEPAM 0.5 MG: 0.5 TABLET ORAL at 18:00

## 2017-07-20 RX ADMIN — VENLAFAXINE HYDROCHLORIDE 75 MG: 75 TABLET, EXTENDED RELEASE ORAL at 08:17

## 2017-07-20 RX ADMIN — MONTELUKAST SODIUM 10 MG: 10 TABLET, FILM COATED ORAL at 19:27

## 2017-07-20 RX ADMIN — LEVOMILNACIPRAN HYDROCHLORIDE 40 MG: 40 CAPSULE, EXTENDED RELEASE ORAL at 08:16

## 2017-07-20 RX ADMIN — TRAZODONE HYDROCHLORIDE 100 MG: 100 TABLET ORAL at 19:27

## 2017-07-20 RX ADMIN — FLUTICASONE PROPIONATE 2 SPRAY: 50 SPRAY, METERED NASAL at 08:17

## 2017-07-20 RX ADMIN — ACETAMINOPHEN 650 MG: 325 TABLET, FILM COATED ORAL at 06:57

## 2017-07-20 RX ADMIN — IBUPROFEN 800 MG: 800 TABLET ORAL at 19:27

## 2017-07-20 RX ADMIN — CLONAZEPAM 0.5 MG: 0.5 TABLET ORAL at 14:07

## 2017-07-20 RX ADMIN — NICOTINE 1 PATCH: 14 PATCH, EXTENDED RELEASE TRANSDERMAL at 08:16

## 2017-07-20 RX ADMIN — CHOLECALCIFEROL CAP 125 MCG (5000 UNIT) 5000 UNITS: 125 CAP at 08:17

## 2017-07-20 RX ADMIN — CLONAZEPAM 0.5 MG: 0.5 TABLET ORAL at 08:17

## 2017-07-20 RX ADMIN — PANTOPRAZOLE SODIUM 40 MG: 40 TABLET, DELAYED RELEASE ORAL at 08:17

## 2017-07-20 RX ADMIN — FLUTICASONE FUROATE AND VILANTEROL TRIFENATATE 1 PUFF: 200; 25 POWDER RESPIRATORY (INHALATION) at 08:17

## 2017-07-20 RX ADMIN — HYDROXYZINE HYDROCHLORIDE 50 MG: 25 TABLET ORAL at 15:58

## 2017-07-20 RX ADMIN — HYDROXYZINE HYDROCHLORIDE 50 MG: 25 TABLET ORAL at 11:25

## 2017-07-20 ASSESSMENT — ACTIVITIES OF DAILY LIVING (ADL)
GROOMING: INDEPENDENT
ORAL_HYGIENE: INDEPENDENT
DRESS: STREET CLOTHES;SCRUBS (BEHAVIORAL HEALTH)
DRESS: INDEPENDENT
LAUNDRY: WITH SUPERVISION
ORAL_HYGIENE: INDEPENDENT
LAUNDRY: WITH SUPERVISION
GROOMING: INDEPENDENT

## 2017-07-20 NOTE — PROGRESS NOTES
"     07/19/17 2148   Behavioral Health   Hallucinations denies / not responding to hallucinations   Thinking intact   Orientation time: oriented;date: oriented;person: oriented;place: oriented   Memory baseline memory   Insight insight appropriate to situation;admits / accepts   Judgement intact   Eye Contact at examiner   Affect full range affect   Mood mood is calm   Physical Appearance/Attire attire appropriate to age and situation   Hygiene well groomed   Suicidality other (see comments)  (pt denies.)   Self Injury other (see comment)  (pt denies.)   Activity other (see comment)  (visible in milieu.)   Speech clear;coherent   Medication Sensitivity no observed side effects;no stated side effects   Psychomotor / Gait balanced;steady   Psycho Education   Type of Intervention 1:1 intervention   Response participates, initiates socially appropriate   Hours 0.5   Treatment Detail (1;1 check in. )   Activities of Daily Living   Hygiene/Grooming independent   Oral Hygiene independent   Dress independent   Laundry with supervision   Room Organization independent   Activity   Activity Level of Assistance independent   Behavioral Health Interventions   Depression maintain safety precautions   Social and Therapeutic Interventions (Depression) encourage socialization with peers      Pt visible in milieu all shift watching TV and coloring.  visited earlier today and pt states she had a good visit.  Pt reports ECT is helping , states she was able to laugh while visiting with her ,\" something I haven't done in a long time\".  Rated her depression and anxiety at a 3, denies SI/SIB.  Pt states she hasn't been seen by her doctor since being admitted, hopes to met with her doctor tomorrow.   "

## 2017-07-20 NOTE — PROGRESS NOTES
Patient reported feeling suicidal and would cut her throat if she had the opportunity,she also reported urges to cut herself.  She said she believes she will not get better,that '' I make a little progress and then go back to being depressed ''  She attended some groups. Was weeping during this interview.

## 2017-07-21 ENCOUNTER — ANESTHESIA EVENT (OUTPATIENT)
Dept: BEHAVIORAL HEALTH | Facility: CLINIC | Age: 32
End: 2017-07-21

## 2017-07-21 ENCOUNTER — ANESTHESIA (OUTPATIENT)
Dept: BEHAVIORAL HEALTH | Facility: CLINIC | Age: 32
End: 2017-07-21

## 2017-07-21 PROCEDURE — 25000125 ZZHC RX 250: Performed by: PSYCHIATRY & NEUROLOGY

## 2017-07-21 PROCEDURE — 25000128 H RX IP 250 OP 636: Performed by: ANESTHESIOLOGY

## 2017-07-21 PROCEDURE — 25000132 ZZH RX MED GY IP 250 OP 250 PS 637: Performed by: ANESTHESIOLOGY

## 2017-07-21 PROCEDURE — 12400007 ZZH R&B MH INTERMEDIATE UMMC

## 2017-07-21 PROCEDURE — 90870 ELECTROCONVULSIVE THERAPY: CPT

## 2017-07-21 PROCEDURE — 25000125 ZZHC RX 250: Performed by: ANESTHESIOLOGY

## 2017-07-21 PROCEDURE — 40000671 ZZH STATISTIC ANESTHESIA CASE

## 2017-07-21 PROCEDURE — 25000128 H RX IP 250 OP 636: Performed by: PSYCHIATRY & NEUROLOGY

## 2017-07-21 PROCEDURE — 25000132 ZZH RX MED GY IP 250 OP 250 PS 637: Performed by: PSYCHIATRY & NEUROLOGY

## 2017-07-21 RX ORDER — ONDANSETRON 2 MG/ML
4 INJECTION INTRAMUSCULAR; INTRAVENOUS
Status: COMPLETED | OUTPATIENT
Start: 2017-07-21 | End: 2017-07-21

## 2017-07-21 RX ORDER — ALBUTEROL SULFATE 0.83 MG/ML
2.5 SOLUTION RESPIRATORY (INHALATION)
Status: CANCELLED
Start: 2017-07-21 | End: 2017-07-21

## 2017-07-21 RX ORDER — LIDOCAINE HYDROCHLORIDE 20 MG/ML
40 INJECTION, SOLUTION EPIDURAL; INFILTRATION; INTRACAUDAL; PERINEURAL
Status: COMPLETED | OUTPATIENT
Start: 2017-07-21 | End: 2017-07-21

## 2017-07-21 RX ORDER — IPRATROPIUM BROMIDE AND ALBUTEROL SULFATE 2.5; .5 MG/3ML; MG/3ML
3 SOLUTION RESPIRATORY (INHALATION)
Status: CANCELLED
Start: 2017-07-21 | End: 2017-07-21

## 2017-07-21 RX ORDER — KETOROLAC TROMETHAMINE 30 MG/ML
30 INJECTION, SOLUTION INTRAMUSCULAR; INTRAVENOUS
Status: COMPLETED | OUTPATIENT
Start: 2017-07-21 | End: 2017-07-21

## 2017-07-21 RX ORDER — ETOMIDATE 2 MG/ML
INJECTION INTRAVENOUS PRN
Status: DISCONTINUED | OUTPATIENT
Start: 2017-07-21 | End: 2017-07-21

## 2017-07-21 RX ORDER — CITRIC ACID/SODIUM CITRATE 334-500MG
30 SOLUTION, ORAL ORAL
Status: COMPLETED | OUTPATIENT
Start: 2017-07-21 | End: 2017-07-21

## 2017-07-21 RX ORDER — IPRATROPIUM BROMIDE AND ALBUTEROL SULFATE 2.5; .5 MG/3ML; MG/3ML
3 SOLUTION RESPIRATORY (INHALATION)
Status: COMPLETED | OUTPATIENT
Start: 2017-07-21 | End: 2017-07-21

## 2017-07-21 RX ORDER — ALBUTEROL SULFATE 0.83 MG/ML
2.5 SOLUTION RESPIRATORY (INHALATION)
Status: ACTIVE | OUTPATIENT
Start: 2017-07-21 | End: 2017-07-21

## 2017-07-21 RX ORDER — OLANZAPINE 5 MG/1
5 TABLET, ORALLY DISINTEGRATING ORAL DAILY
Status: DISCONTINUED | OUTPATIENT
Start: 2017-07-21 | End: 2017-07-28 | Stop reason: HOSPADM

## 2017-07-21 RX ADMIN — VENLAFAXINE HYDROCHLORIDE 75 MG: 75 TABLET, EXTENDED RELEASE ORAL at 12:10

## 2017-07-21 RX ADMIN — CLONAZEPAM 0.5 MG: 0.5 TABLET ORAL at 16:05

## 2017-07-21 RX ADMIN — ETOMIDATE 16 MG: 2 INJECTION INTRAVENOUS at 07:55

## 2017-07-21 RX ADMIN — OLANZAPINE 5 MG: 5 TABLET, ORALLY DISINTEGRATING ORAL at 12:10

## 2017-07-21 RX ADMIN — LIDOCAINE HYDROCHLORIDE 40 MG: 20 INJECTION, SOLUTION INTRAVENOUS at 07:55

## 2017-07-21 RX ADMIN — RACEPINEPHRINE HYDROCHLORIDE 0.5 ML: 11.25 SOLUTION RESPIRATORY (INHALATION) at 08:17

## 2017-07-21 RX ADMIN — SODIUM CITRATE AND CITRIC ACID MONOHYDRATE 30 ML: 500; 334 SOLUTION ORAL at 07:08

## 2017-07-21 RX ADMIN — CLONAZEPAM 0.5 MG: 0.5 TABLET ORAL at 19:52

## 2017-07-21 RX ADMIN — KETOROLAC TROMETHAMINE 30 MG: 30 INJECTION, SOLUTION INTRAMUSCULAR at 07:12

## 2017-07-21 RX ADMIN — MIDAZOLAM HYDROCHLORIDE 2 MG: 1 INJECTION, SOLUTION INTRAMUSCULAR; INTRAVENOUS at 07:55

## 2017-07-21 RX ADMIN — MAGNESIUM HYDROXIDE 30 ML: 400 SUSPENSION ORAL at 19:52

## 2017-07-21 RX ADMIN — PANTOPRAZOLE SODIUM 40 MG: 40 TABLET, DELAYED RELEASE ORAL at 05:39

## 2017-07-21 RX ADMIN — ACETAMINOPHEN 650 MG: 325 TABLET, FILM COATED ORAL at 14:41

## 2017-07-21 RX ADMIN — MONTELUKAST SODIUM 10 MG: 10 TABLET, FILM COATED ORAL at 19:52

## 2017-07-21 RX ADMIN — CLONAZEPAM 0.5 MG: 0.5 TABLET ORAL at 12:13

## 2017-07-21 RX ADMIN — SUCCINYLCHOLINE CHLORIDE 100 MG: 20 INJECTION, SOLUTION INTRAMUSCULAR; INTRAVENOUS at 07:55

## 2017-07-21 RX ADMIN — IPRATROPIUM BROMIDE AND ALBUTEROL SULFATE 3 ML: .5; 3 SOLUTION RESPIRATORY (INHALATION) at 07:00

## 2017-07-21 RX ADMIN — TRAZODONE HYDROCHLORIDE 100 MG: 100 TABLET ORAL at 19:52

## 2017-07-21 RX ADMIN — LEVOMILNACIPRAN HYDROCHLORIDE 40 MG: 40 CAPSULE, EXTENDED RELEASE ORAL at 12:10

## 2017-07-21 RX ADMIN — MIDAZOLAM HYDROCHLORIDE 2 MG: 1 INJECTION, SOLUTION INTRAMUSCULAR; INTRAVENOUS at 08:05

## 2017-07-21 RX ADMIN — ONDANSETRON 4 MG: 2 INJECTION INTRAMUSCULAR; INTRAVENOUS at 07:13

## 2017-07-21 RX ADMIN — FLUTICASONE PROPIONATE 2 SPRAY: 50 SPRAY, METERED NASAL at 12:14

## 2017-07-21 RX ADMIN — FLUTICASONE FUROATE AND VILANTEROL TRIFENATATE 1 PUFF: 200; 25 POWDER RESPIRATORY (INHALATION) at 05:41

## 2017-07-21 RX ADMIN — SODIUM CHLORIDE, POTASSIUM CHLORIDE, SODIUM LACTATE AND CALCIUM CHLORIDE 1000 ML: 600; 310; 30; 20 INJECTION, SOLUTION INTRAVENOUS at 07:41

## 2017-07-21 RX ADMIN — NICOTINE 1 PATCH: 14 PATCH, EXTENDED RELEASE TRANSDERMAL at 12:10

## 2017-07-21 RX ADMIN — CHOLECALCIFEROL CAP 125 MCG (5000 UNIT) 5000 UNITS: 125 CAP at 12:10

## 2017-07-21 RX ADMIN — ALBUTEROL SULFATE 2 PUFF: 90 AEROSOL, METERED RESPIRATORY (INHALATION) at 06:38

## 2017-07-21 ASSESSMENT — ENCOUNTER SYMPTOMS: SEIZURES: 0

## 2017-07-21 ASSESSMENT — ACTIVITIES OF DAILY LIVING (ADL)
DRESS: INDEPENDENT
ORAL_HYGIENE: INDEPENDENT
GROOMING: INDEPENDENT
ORAL_HYGIENE: INDEPENDENT
GROOMING: INDEPENDENT
LAUNDRY: UNABLE TO COMPLETE
LAUNDRY: WITH SUPERVISION
DRESS: SCRUBS (BEHAVIORAL HEALTH)

## 2017-07-21 ASSESSMENT — LIFESTYLE VARIABLES: TOBACCO_USE: 1

## 2017-07-21 NOTE — PROGRESS NOTES
07/21/17 1308   Behavioral Health   Hallucinations denies / not responding to hallucinations   Thinking poor concentration   Orientation time: oriented;date: oriented;place: oriented;person: oriented   Memory baseline memory   Insight poor   Judgement impaired   Eye Contact at examiner   Affect blunted, flat   Mood mood is calm   Physical Appearance/Attire neat   Hygiene well groomed   Suicidality other (see comments)  (pt denies )   Self Injury other (see comment)  (pt denies )   Elopement (no comments or gestures )   Activity isolative   Speech coherent;clear   Medication Sensitivity no stated side effects   Psychomotor / Gait balanced;steady   Activities of Daily Living   Hygiene/Grooming independent   Oral Hygiene independent   Dress independent   Laundry unable to complete   Room Organization independent   Behavioral Health Interventions   Depression maintain safety precautions;monitor need to revise level of observation;maintain safe secure environment;assist patient in developing safety plan;assist patient in following safety plan;encourage nutrition and hydration;encourage participation / independence with adls;provide emotional support;establish therapeutic relationship;assist with developing and utilizing healthy coping strategies;build upon strengths   Social and Therapeutic Interventions (Depression) encourage socialization with peers;encourage effective boundaries with peers;encourage participation in therapeutic groups and milieu activities     Pt was at ECT this morning and reported feeling very groggy afterwards. She denies SI and SIB at this moment.

## 2017-07-21 NOTE — PROGRESS NOTES
Pt tearful on phone with sister and friend.  Sister called unit back with concerns.  No current PETE for sister.  Discussed with patient and she doesn't want to sign PETE for sister.  Sister requested we ask her.  Pt stating she is feeling the most suicidal she has felt since she has been here.  Monica for safety and states she feels safe on the unit.  Suggested numerous coping skills and pt politely refused them all.  Asked pt to keep her room door open and staff removed many items for safety concerns. Pt Asked for  HS medications early and was content waiting until scheduled time.  2000 dose of Klonopin administered early at 1800 per peer RN recommendation because pt may have ECT on 7/21/17.  Pt inquired about ECT schedule in AM and was given information regarding the anticipated schedule.  She also seemed accepting of this information.

## 2017-07-21 NOTE — PROGRESS NOTES
"[facility]   Dr. Lemus's Psychiatric Progress Note  2017      Patient:  Nuha Lees   Medical Record Number:  [id number]  :  1985          Interim History:   The patient's care was discussed with the treatment team and chart notes were reviewed.  Mood is more depressed last 2 days.  The suicidal thoughts are actually worse, although she feels safe on the unit.      Psychiatric ROS:  Mood: more depressed;  Anxiety is \"very high\"  Sleep:  A little better  Appetite:  poor  Eating:  Not eating much  Energy Level:  poor  Concentration/Memory: poor  Suicidal Thoughts:Yes ;  Still feels suicidal but feels safe on the unit  Homicidal Thoughts:No  Psychotic Symptoms: No  Medication Side Effects:  Headaches  Medication Compliance:Yes   Physical Complaints:  asthma         Medications:     Current Facility-Administered Medications   Medication     ondansetron (ZOFRAN) injection 4 mg     lidocaine (PF) (XYLOCAINE) 2 % injection 40 mg     ketorolac (TORADOL) injection 30 mg     midazolam (VERSED) injection 1-2 mg     albuterol neb solution 2.5 mg     [START ON 2017] venlafaxine (EFFEXOR-ER) 24 hr tablet 37.5 mg     ibuprofen (ADVIL/MOTRIN) tablet 800 mg     traZODone (DESYREL) tablet 100 mg     albuterol (PROAIR HFA/PROVENTIL HFA/VENTOLIN HFA) Inhaler 2 puff     clonazePAM (klonoPIN) tablet 0.5 mg     fluticasone-vilanterol (BREO ELLIPTA) 200-25 MCG/INH oral inhaler 1 puff     hydrOXYzine (ATARAX) tablet 25-50 mg     ipratropium - albuterol 0.5 mg/2.5 mg/3 mL (DUONEB) neb solution 3 mL     montelukast (SINGULAIR) tablet 10 mg     nicotine (NICODERM CQ) 14 MG/24HR 24 hr patch 1 patch     pantoprazole (PROTONIX) EC tablet 40 mg     cholecalciferol (vitamin D3) capsule CAPS 5,000 Units     acetaminophen (TYLENOL) tablet 650 mg     alum & mag hydroxide-simethicone (MYLANTA ES/MAALOX  ES) suspension 30 mL     magnesium hydroxide (MILK OF MAGNESIA) suspension 30 mL     " nicotine patch REMOVAL     nicotine Patch in Place     venlafaxine (EFFEXOR-ER) 24 hr tablet 75 mg     levomilnacipran (FETZIMA ER) 24 hr capsule 40 mg     [START ON 7/23/2017] levomilnacipran (FETZIMA ER) 24 hr capsule 80 mg     fluticasone (FLONASE) 50 MCG/ACT spray 2 spray             Allergies:     Allergies   Allergen Reactions     Codeine Sulfate      Patient reported tolerated Ultram in the past     Sulfa Drugs      Trintellix [Vortioxetine]             Psychiatric Examination:   BP 96/58 (BP Location: Right arm)  Pulse 59  Temp 97.8  F (36.6  C) (Oral)  Resp 18  Wt 81.6 kg (180 lb)  SpO2 96%  BMI 29.99 kg/m2  Weight is 180 lbs 0 oz  Body mass index is 29.99 kg/(m^2).    Appearance:  poorly groomed  Attitude:  cooperative  Eye Contact:  good  Mood:  More depressed;  anxious  Affect:  mood congruent  Speech:  clear, coherent  Psychomotor Behavior:  no evidence of tardive dyskinesia, dystonia, or tics  Throught Process:  logical  Associations:  no loose associations  Thought Content:  no evidence of psychotic thought and active suicidal ideation present;  Feels safe on the unit and is able to contract for safety.    Insight:  fair  Judgement:  intact  Oriented to:  time, person, and place  Attention Span and Concentration:  intact  Recent and Remote Memory:  intact  Gait:Normal    Risk/Potential for Dangerousness:  Multiple Active Diagnoses:HIGH  Self Care:HIGH  Suicide:HIGH  Assault:LOW  Self Injurious Behaviors:LOW  Inappropriate Sexual Behavior:LOW         Labs:        Recent Results (from the past 1008 hour(s))   EKG 12-lead, complete    Collection Time: 07/13/17  4:51 PM   Result Value Ref Range    Interpretation ECG Click View Image link to view waveform and result    Potassium    Collection Time: 07/14/17  7:51 AM   Result Value Ref Range    Potassium 3.7 3.4 - 5.3 mmol/L   Hemoglobin    Collection Time: 07/14/17  7:51 AM   Result Value Ref Range    Hemoglobin 14.1 11.7 - 15.7 g/dL   CBC with  platelets    Collection Time: 07/17/17  6:09 AM   Result Value Ref Range    WBC 9.2 4.0 - 11.0 10e9/L    RBC Count 5.31 (H) 3.8 - 5.2 10e12/L    Hemoglobin 15.5 11.7 - 15.7 g/dL    Hematocrit 46.9 35.0 - 47.0 %    MCV 88 78 - 100 fl    MCH 29.2 26.5 - 33.0 pg    MCHC 33.0 31.5 - 36.5 g/dL    RDW 12.4 10.0 - 15.0 %    Platelet Count 229 150 - 450 10e9/L   Basic metabolic panel    Collection Time: 07/17/17  6:09 AM   Result Value Ref Range    Sodium 142 133 - 144 mmol/L    Potassium 3.6 3.4 - 5.3 mmol/L    Chloride 107 94 - 109 mmol/L    Carbon Dioxide 24 20 - 32 mmol/L    Anion Gap 11 3 - 14 mmol/L    Glucose 97 70 - 99 mg/dL    Urea Nitrogen 13 7 - 30 mg/dL    Creatinine 1.01 0.52 - 1.04 mg/dL    GFR Estimate 63 >60 mL/min/1.7m2    GFR Estimate If Black 76 >60 mL/min/1.7m2    Calcium 9.1 8.5 - 10.1 mg/dL         Impression:   This is a 31 year old female admitted with worsening depression and suicidal ideation and inability to care for herself.  She's continuing ECT.  Mood is more depressed.           DIagnoses:      Axis I.  Major depressive disorder, recurrent.  Panic disorder with agoraphobia.  Generalized anxiety disorder.       Axis II. Deferred.       Axis III.  Asthma.             Plan:     Continue tapering off Effexor XR.    Started and titrating Fetzima.  She completed cytochrome P450 testing last month.  Continue ECT series.   ECT treatment #3 is today.   Treatment #4 will be 7/24/17.    Expect stabilization and discharge back home with her family.   Patient needs to be in the hospital due to here severe depression and suicidality.      Heber Lemus MD

## 2017-07-21 NOTE — PROGRESS NOTES
07/20/17 2205   Behavioral Health   Hallucinations denies / not responding to hallucinations   Thinking poor concentration   Orientation time: oriented;place: oriented;person: oriented;date: oriented   Memory baseline memory   Insight poor   Judgement impaired   Eye Contact at examiner   Affect blunted, flat;sad  (Pt was sad most of this shift, crying .)   Mood depressed   Physical Appearance/Attire neat   Hygiene well groomed   Suicidality thoughts only  (Pt reports feeling more suicidal than normal.)   Self Injury other (see comment)   Activity isolative;withdrawn   Speech other (see comments)  (Pt crying a lot during conversations and check in.)   Medication Sensitivity no observed side effects;no stated side effects   Psychomotor / Gait balanced;steady   Psycho Education   Type of Intervention 1:1 intervention   Response participates, initiates socially appropriate   Hours 0.5   Treatment Detail (1:1 check in.)   Activities of Daily Living   Hygiene/Grooming independent   Oral Hygiene independent   Dress street clothes;scrubs (behavioral health)   Laundry with supervision   Room Organization independent   Activity   Activity Level of Assistance independent   Behavioral Health Interventions   Depression maintain safety precautions   Social and Therapeutic Interventions (Depression) encourage socialization with peers     Pt appeared to be having a rough day, she was tearful at the beginning of the shift. Pt later asked this staff to help her place a long distance call. While giving staff the telephone phone, she began to cry. Pt was unable to reach her sister so she called her  instead. She cried during the entire conversation . Pt sister later called and reported to her nurse that she was feeling more suicidal today than normal.  This staff  Went in to check on the pt and  saw her sitting on her bathroom floor crying hysterically.  Pt did admit to feeling more suicidal today, she was given a  medication by her nurse and she later calmed down, Pt came out of her room and hung out in the living room.   Pt refused her dinner, she did take her night meds and went to bed.

## 2017-07-21 NOTE — ANESTHESIA PREPROCEDURE EVALUATION
Anesthesia Evaluation     . Pt has had prior anesthetic. Type: General    History of anesthetic complications    Aspiration and severe bronchospasm requiring intubation      ROS/MED HX    ENT/Pulmonary:     (+)tobacco use, Current use Intermittent asthma Treatment: Inhaler prn,  , . .   (-) sleep apnea and recent URI   Neurologic:      (-) seizures   Cardiovascular:  - neg cardiovascular ROS       METS/Exercise Tolerance:  3 - Able to walk 1-2 blocks without stopping   Hematologic:  - neg hematologic  ROS       Musculoskeletal:  - neg musculoskeletal ROS       GI/Hepatic:     (+) GERD Asymptomatic on medication,       Renal/Genitourinary:  - ROS Renal section negative       Endo:  - neg endo ROS       Psychiatric:     (+) psychiatric history depression      Infectious Disease:  - neg infectious disease ROS       Malignancy:      - no malignancy   Other:    - neg other ROS                 Physical Exam  Normal systems: dental    Airway   Mallampati: I  TM distance: >3 FB  Neck ROM: full    Dental     Cardiovascular       Pulmonary                         Anesthesia Plan      History & Physical Review  History and physical reviewed and following examination; no interval change.    ASA Status:  2 .    NPO Status:  > 6 hours    Plan for General, RSI and ETT with Intravenous induction.     Additional equipment: Videolaryngoscope Cmac, blade 3, to expedite ETT placement.      Postoperative Care      Consents  Anesthetic plan, risks, benefits and alternatives discussed with:  Patient..

## 2017-07-21 NOTE — PROGRESS NOTES
During environmental checks, there were very suicidal notes found in the pt's room that had today's date on them. There even appeared to be a suicide letter. Pt denied SI and SIB when speaking with her earlier in the shift.

## 2017-07-21 NOTE — PROCEDURES
Procedure/Surgery Information   [facility], [place]    Bedside Procedure Note  Date of Service (when I performed the procedure): 07/21/2017    Nuha Lees is a 31 year old female patient.  1. Severe recurrent major depression without psychotic features (H)      Past Medical History:   Diagnosis Date     Asthma      Temp: 97.8  F (36.6  C) Temp src: Oral BP: 96/58 Pulse: 59   Resp: 18 SpO2: 96 % O2 Device: None (Room air)      Procedures     Heber Lemus     [facility], [place]   ECT Procedure Note  07/21/2017    Nuha Lees [id number]   31 year old 1985     Patient Status: Inpatient    Is this the first in a series of 12 treatments?  No    History and Physical: Reviewed in medical record    Consent: Informed consent was signed by: patient    Date Consent Signed: 7/17/17      Allergies   Allergen Reactions     Codeine Sulfate      Patient reported tolerated Ultram in the past     Sulfa Drugs      Trintellix [Vortioxetine]        Weight:  180 lbs 0 oz    Patient Preparations: Glasses/Contacts removed         Indications for ECT:   Medications ineffective and Imminent suicide risk         Clinical Narrative:   Patient was admitted with worsening depression and suicidal ideation.  NPO after 2400.  Alert and Oriented x 3.  She did not have muscle aches with the last ECT.  Mood is very depressed.            Diagnosis:   Major depression (F33.2)         Pause for the Cause:     Right patient Yes   Right procedure/laterality settings: Yes          Intra-Procedure Documentation:       ECT #: 3   Treatment number this series: 3   Total treatment number: 3     Type of ECT:  Right, unilateral ultrabrief    ECT Medications:    Next Tx add:  Zyprexa Zydis:  5mg po before coming to ECT  Duoneb  Racemic Epi Nebulizer  Bicitra: 30cc  Zofran: 4mg  Toradol 30mg for HA  Etomidate: 16mg  Succinyl Choline: 100mg   Versed:  2mg for post ECT agitation + 2mg  repeat dose    ECT Strip Summary:   Energy Level: 40 percent  Motor Seizure Duration:  62 seconds  EEG Seizure Duration:   71 seconds    Complications: No    Plan:   Next ECT 7/24/19.  Have her ready to go at 6:45.      Heber Lemus MD

## 2017-07-22 PROCEDURE — 12400007 ZZH R&B MH INTERMEDIATE UMMC

## 2017-07-22 PROCEDURE — 25000132 ZZH RX MED GY IP 250 OP 250 PS 637: Performed by: PSYCHIATRY & NEUROLOGY

## 2017-07-22 RX ADMIN — ACETAMINOPHEN 650 MG: 325 TABLET, FILM COATED ORAL at 09:05

## 2017-07-22 RX ADMIN — CLONAZEPAM 0.5 MG: 0.5 TABLET ORAL at 13:02

## 2017-07-22 RX ADMIN — IBUPROFEN 800 MG: 800 TABLET ORAL at 21:33

## 2017-07-22 RX ADMIN — CLONAZEPAM 0.5 MG: 0.5 TABLET ORAL at 21:32

## 2017-07-22 RX ADMIN — LEVOMILNACIPRAN HYDROCHLORIDE 40 MG: 40 CAPSULE, EXTENDED RELEASE ORAL at 09:03

## 2017-07-22 RX ADMIN — MONTELUKAST SODIUM 10 MG: 10 TABLET, FILM COATED ORAL at 21:33

## 2017-07-22 RX ADMIN — CLONAZEPAM 0.5 MG: 0.5 TABLET ORAL at 09:03

## 2017-07-22 RX ADMIN — NICOTINE 1 PATCH: 14 PATCH, EXTENDED RELEASE TRANSDERMAL at 09:04

## 2017-07-22 RX ADMIN — TRAZODONE HYDROCHLORIDE 100 MG: 100 TABLET ORAL at 21:33

## 2017-07-22 RX ADMIN — FLUTICASONE PROPIONATE 2 SPRAY: 50 SPRAY, METERED NASAL at 09:08

## 2017-07-22 RX ADMIN — CHOLECALCIFEROL CAP 125 MCG (5000 UNIT) 5000 UNITS: 125 CAP at 09:03

## 2017-07-22 RX ADMIN — PANTOPRAZOLE SODIUM 40 MG: 40 TABLET, DELAYED RELEASE ORAL at 09:04

## 2017-07-22 RX ADMIN — FLUTICASONE FUROATE AND VILANTEROL TRIFENATATE 1 PUFF: 200; 25 POWDER RESPIRATORY (INHALATION) at 09:08

## 2017-07-22 RX ADMIN — VENLAFAXINE HYDROCHLORIDE 75 MG: 75 TABLET, EXTENDED RELEASE ORAL at 09:03

## 2017-07-22 RX ADMIN — OLANZAPINE 5 MG: 5 TABLET, ORALLY DISINTEGRATING ORAL at 09:03

## 2017-07-22 ASSESSMENT — ACTIVITIES OF DAILY LIVING (ADL)
GROOMING: INDEPENDENT
DRESS: STREET CLOTHES
ORAL_HYGIENE: INDEPENDENT
ORAL_HYGIENE: INDEPENDENT
LAUNDRY: WITH SUPERVISION
GROOMING: SHOWER;INDEPENDENT
DRESS: SCRUBS (BEHAVIORAL HEALTH)
LAUNDRY: WITH SUPERVISION

## 2017-07-22 NOTE — PROGRESS NOTES
Pt visible in milieu ,social with peers. Playing games & interacting. Mood brighter . Pt feeling more stable   Reports ECT is effective.     07/22/17 1400   Behavioral Health   Hallucinations denies / not responding to hallucinations   Thinking other (see comment)  (more clarity)   Orientation person: oriented;place: oriented;date: oriented   Memory short term   Insight admits / accepts   Judgement intact   Eye Contact at examiner   Affect full range affect   Mood mood is calm   Physical Appearance/Attire attire appropriate to age and situation;neat   Hygiene well groomed   Suicidality other (see comments)  (none)   Self Injury other (see comment)  (none)   Elopement (no risk)   Activity other (see comment)  (visible in milieu, active in group activities)   Speech clear;coherent   Medication Sensitivity no stated side effects   Psychomotor / Gait balanced;steady   Safety   Suicidality status 15   Coping/Psychosocial   Verbalized Emotional State acceptance;hopefulness   Plan Of Care Reviewed With patient   Psycho Education   Type of Intervention 1:1 intervention   Response participates with encouragement   Hours 0.5   Treatment Detail check in   Activities of Daily Living   Hygiene/Grooming shower;independent   Oral Hygiene independent   Dress street clothes   Laundry with supervision   Room Organization independent   Activity   Activity Level of Assistance independent   Behavioral Health Interventions   Depression maintain safety precautions;provide emotional support   Social and Therapeutic Interventions (Depression) encourage participation in therapeutic groups and milieu activities

## 2017-07-23 ENCOUNTER — ANESTHESIA EVENT (OUTPATIENT)
Dept: SURGERY | Facility: CLINIC | Age: 32
End: 2017-07-23

## 2017-07-23 PROCEDURE — 25000132 ZZH RX MED GY IP 250 OP 250 PS 637: Performed by: PSYCHIATRY & NEUROLOGY

## 2017-07-23 PROCEDURE — 12400007 ZZH R&B MH INTERMEDIATE UMMC

## 2017-07-23 RX ADMIN — CLONAZEPAM 0.5 MG: 0.5 TABLET ORAL at 08:17

## 2017-07-23 RX ADMIN — VENLAFAXINE HYDROCHLORIDE 75 MG: 75 TABLET, EXTENDED RELEASE ORAL at 08:18

## 2017-07-23 RX ADMIN — NICOTINE 1 PATCH: 14 PATCH, EXTENDED RELEASE TRANSDERMAL at 08:18

## 2017-07-23 RX ADMIN — FLUTICASONE PROPIONATE 2 SPRAY: 50 SPRAY, METERED NASAL at 08:20

## 2017-07-23 RX ADMIN — PANTOPRAZOLE SODIUM 40 MG: 40 TABLET, DELAYED RELEASE ORAL at 08:17

## 2017-07-23 RX ADMIN — HYDROXYZINE HYDROCHLORIDE 50 MG: 25 TABLET ORAL at 16:53

## 2017-07-23 RX ADMIN — HYDROXYZINE HYDROCHLORIDE 50 MG: 25 TABLET ORAL at 11:56

## 2017-07-23 RX ADMIN — CLONAZEPAM 0.5 MG: 0.5 TABLET ORAL at 14:31

## 2017-07-23 RX ADMIN — TRAZODONE HYDROCHLORIDE 100 MG: 100 TABLET ORAL at 20:04

## 2017-07-23 RX ADMIN — CHOLECALCIFEROL CAP 125 MCG (5000 UNIT) 5000 UNITS: 125 CAP at 08:18

## 2017-07-23 RX ADMIN — LEVOMILNACIPRAN HYDROCHLORIDE 80 MG: 40 CAPSULE, EXTENDED RELEASE ORAL at 08:18

## 2017-07-23 RX ADMIN — OLANZAPINE 5 MG: 5 TABLET, ORALLY DISINTEGRATING ORAL at 08:18

## 2017-07-23 RX ADMIN — FLUTICASONE FUROATE AND VILANTEROL TRIFENATATE 1 PUFF: 200; 25 POWDER RESPIRATORY (INHALATION) at 08:20

## 2017-07-23 RX ADMIN — CLONAZEPAM 0.5 MG: 0.5 TABLET ORAL at 20:04

## 2017-07-23 RX ADMIN — MONTELUKAST SODIUM 10 MG: 10 TABLET, FILM COATED ORAL at 20:04

## 2017-07-23 ASSESSMENT — ACTIVITIES OF DAILY LIVING (ADL)
GROOMING: INDEPENDENT
ORAL_HYGIENE: INDEPENDENT
DRESS: INDEPENDENT
DRESS: SCRUBS (BEHAVIORAL HEALTH)
GROOMING: INDEPENDENT
ORAL_HYGIENE: INDEPENDENT
LAUNDRY: WITH SUPERVISION

## 2017-07-23 NOTE — PROGRESS NOTES
Pt isolative & depressed& is Looking forward to Ect on Monday. Out for meals & goes back to room . Pt has regressed a bit since Friday. Her  visited for a bit & left     07/23/17 1400   Behavioral Health   Hallucinations denies / not responding to hallucinations   Thinking poor concentration   Orientation person: oriented;place: oriented;date: oriented   Memory short term   Insight insight appropriate to situation   Judgement impaired   Eye Contact at examiner   Affect full range affect   Mood mood is calm   Physical Appearance/Attire disheveled   Hygiene other (see comment)  (adequate)   Suicidality thoughts only   Self Injury other (see comment)  (none)   Activity isolative   Speech clear;coherent   Medication Sensitivity no stated side effects;no observed side effects   Psychomotor / Gait balanced;steady   Coping/Psychosocial   Verbalized Emotional State depression   Plan Of Care Reviewed With patient   Psycho Education   Type of Intervention 1:1 intervention   Response participates with encouragement   Hours 0.5   Treatment Detail check in   Activities of Daily Living   Hygiene/Grooming independent   Oral Hygiene independent   Dress scrubs (behavioral health)   Laundry with supervision   Room Organization independent   Behavioral Health Interventions   Depression maintain safety precautions;provide emotional support   Social and Therapeutic Interventions (Depression) encourage participation in therapeutic groups and milieu activities

## 2017-07-23 NOTE — PLAN OF CARE
Problem: Depressive Symptoms  Goal: Depressive Symptoms  ..Pt. Reports absence of suicidal ideation, self-injurious behavior. Pt. Attends and participates in groups. Pt. Engages in 1:1 with staff. Pt. Reports increase in mood and affect. Pt. s appetite is appropriate to current medical status. Pt. Reports sleep is adequate. Pt. Accepts medications without difficulty. Pt. s thoughts appear clear, reality oriented. Pt. s behavior is appropriate with interactions of staff and other pts. ..Pt. Will report side affects of ECT, ie. Confusion, headache, body aches. Pt. Will engage with staff to evaluate any changes in pt. s emotional, physical, and/or mental status.   Outcome: No Change  48 hour nursing assessment.  Pt evaluation continues.  Assessed mood, anxiety, thoughts and behavior. Patient is calm, with flat affect. Patient denies SI, SIB.  Patient says anxiety is improving for the most part but notices she gets more anxious the night before ECT.  Informed patient that would be understandable.  Is progressing towards goals.  Encourage participation in groups and developing health coping skills.  Will continue to assess.  Pt denies auditory or visual hallucinations.  Refer to daily team meeting notes for individualized plan of care.     Patient got up slightly before dinner and had been sleeping since 1:00pm today.  She was quite shocked she had slept that long.  Had dinner, showered and then called her .   is coming to visit tomorrow and is looking forward to his visit.  Patient playing games with peers this evening. Continue to monitor.

## 2017-07-24 ENCOUNTER — ANESTHESIA (OUTPATIENT)
Dept: BEHAVIORAL HEALTH | Facility: CLINIC | Age: 32
End: 2017-07-24

## 2017-07-24 ENCOUNTER — ANESTHESIA EVENT (OUTPATIENT)
Dept: BEHAVIORAL HEALTH | Facility: CLINIC | Age: 32
End: 2017-07-24

## 2017-07-24 ENCOUNTER — SURGERY (OUTPATIENT)
Age: 32
End: 2017-07-24

## 2017-07-24 PROCEDURE — 25000132 ZZH RX MED GY IP 250 OP 250 PS 637: Performed by: PSYCHIATRY & NEUROLOGY

## 2017-07-24 PROCEDURE — 25000128 H RX IP 250 OP 636: Performed by: PSYCHIATRY & NEUROLOGY

## 2017-07-24 PROCEDURE — 12400007 ZZH R&B MH INTERMEDIATE UMMC

## 2017-07-24 PROCEDURE — 25000125 ZZHC RX 250: Performed by: NURSE ANESTHETIST, CERTIFIED REGISTERED

## 2017-07-24 PROCEDURE — 25000125 ZZHC RX 250: Performed by: PSYCHIATRY & NEUROLOGY

## 2017-07-24 PROCEDURE — 40000170 ZZH STATISTIC PRE-PROCEDURE ASSESSMENT II

## 2017-07-24 PROCEDURE — 71000014 ZZH RECOVERY PHASE 1 LEVEL 2 FIRST HR

## 2017-07-24 PROCEDURE — 90870 ELECTROCONVULSIVE THERAPY: CPT

## 2017-07-24 PROCEDURE — 40000671 ZZH STATISTIC ANESTHESIA CASE

## 2017-07-24 PROCEDURE — 25000128 H RX IP 250 OP 636: Performed by: NURSE ANESTHETIST, CERTIFIED REGISTERED

## 2017-07-24 RX ORDER — ETOMIDATE 2 MG/ML
INJECTION INTRAVENOUS PRN
Status: DISCONTINUED | OUTPATIENT
Start: 2017-07-24 | End: 2017-07-24

## 2017-07-24 RX ORDER — KETOROLAC TROMETHAMINE 30 MG/ML
30 INJECTION, SOLUTION INTRAMUSCULAR; INTRAVENOUS
Status: COMPLETED | OUTPATIENT
Start: 2017-07-24 | End: 2017-07-24

## 2017-07-24 RX ORDER — CITRIC ACID/SODIUM CITRATE 334-500MG
30 SOLUTION, ORAL ORAL
Status: COMPLETED | OUTPATIENT
Start: 2017-07-24 | End: 2017-07-24

## 2017-07-24 RX ORDER — ONDANSETRON 2 MG/ML
4 INJECTION INTRAMUSCULAR; INTRAVENOUS
Status: COMPLETED | OUTPATIENT
Start: 2017-07-24 | End: 2017-07-24

## 2017-07-24 RX ORDER — SODIUM CHLORIDE, SODIUM LACTATE, POTASSIUM CHLORIDE, CALCIUM CHLORIDE 600; 310; 30; 20 MG/100ML; MG/100ML; MG/100ML; MG/100ML
INJECTION, SOLUTION INTRAVENOUS CONTINUOUS PRN
Status: DISCONTINUED | OUTPATIENT
Start: 2017-07-24 | End: 2017-07-24

## 2017-07-24 RX ORDER — LIDOCAINE HYDROCHLORIDE 20 MG/ML
40 INJECTION, SOLUTION EPIDURAL; INFILTRATION; INTRACAUDAL; PERINEURAL
Status: COMPLETED | OUTPATIENT
Start: 2017-07-24 | End: 2017-07-24

## 2017-07-24 RX ORDER — IPRATROPIUM BROMIDE AND ALBUTEROL SULFATE 2.5; .5 MG/3ML; MG/3ML
3 SOLUTION RESPIRATORY (INHALATION)
Status: COMPLETED | OUTPATIENT
Start: 2017-07-24 | End: 2017-07-24

## 2017-07-24 RX ADMIN — ACETAMINOPHEN 650 MG: 325 TABLET, FILM COATED ORAL at 10:23

## 2017-07-24 RX ADMIN — IPRATROPIUM BROMIDE AND ALBUTEROL SULFATE 3 ML: .5; 3 SOLUTION RESPIRATORY (INHALATION) at 06:30

## 2017-07-24 RX ADMIN — IBUPROFEN 800 MG: 800 TABLET ORAL at 19:22

## 2017-07-24 RX ADMIN — OLANZAPINE 5 MG: 5 TABLET, ORALLY DISINTEGRATING ORAL at 05:36

## 2017-07-24 RX ADMIN — CLONAZEPAM 0.5 MG: 0.5 TABLET ORAL at 14:08

## 2017-07-24 RX ADMIN — TRAZODONE HYDROCHLORIDE 100 MG: 100 TABLET ORAL at 19:23

## 2017-07-24 RX ADMIN — KETOROLAC TROMETHAMINE 30 MG: 30 INJECTION, SOLUTION INTRAMUSCULAR at 06:39

## 2017-07-24 RX ADMIN — ALBUTEROL SULFATE 2 PUFF: 90 AEROSOL, METERED RESPIRATORY (INHALATION) at 05:38

## 2017-07-24 RX ADMIN — CLONAZEPAM 0.5 MG: 0.5 TABLET ORAL at 10:22

## 2017-07-24 RX ADMIN — CHOLECALCIFEROL CAP 125 MCG (5000 UNIT) 5000 UNITS: 125 CAP at 10:23

## 2017-07-24 RX ADMIN — RACEPINEPHRINE HYDROCHLORIDE 0.5 ML: 11.25 SOLUTION RESPIRATORY (INHALATION) at 06:40

## 2017-07-24 RX ADMIN — ONDANSETRON 4 MG: 2 INJECTION INTRAMUSCULAR; INTRAVENOUS at 06:39

## 2017-07-24 RX ADMIN — FLUTICASONE FUROATE AND VILANTEROL TRIFENATATE 1 PUFF: 200; 25 POWDER RESPIRATORY (INHALATION) at 05:37

## 2017-07-24 RX ADMIN — PANTOPRAZOLE SODIUM 40 MG: 40 TABLET, DELAYED RELEASE ORAL at 05:36

## 2017-07-24 RX ADMIN — OLANZAPINE 5 MG: 5 TABLET, ORALLY DISINTEGRATING ORAL at 10:23

## 2017-07-24 RX ADMIN — CLONAZEPAM 0.5 MG: 0.5 TABLET ORAL at 19:22

## 2017-07-24 RX ADMIN — SODIUM CHLORIDE, POTASSIUM CHLORIDE, SODIUM LACTATE AND CALCIUM CHLORIDE: 600; 310; 30; 20 INJECTION, SOLUTION INTRAVENOUS at 06:48

## 2017-07-24 RX ADMIN — MONTELUKAST SODIUM 10 MG: 10 TABLET, FILM COATED ORAL at 19:23

## 2017-07-24 RX ADMIN — VENLAFAXINE HYDROCHLORIDE 37.5 MG: 37.5 TABLET, EXTENDED RELEASE ORAL at 10:22

## 2017-07-24 RX ADMIN — MAGNESIUM HYDROXIDE 30 ML: 400 SUSPENSION ORAL at 19:22

## 2017-07-24 RX ADMIN — SUCCINYLCHOLINE CHLORIDE 100 MG: 20 INJECTION, SOLUTION INTRAMUSCULAR; INTRAVENOUS at 06:54

## 2017-07-24 RX ADMIN — LIDOCAINE HYDROCHLORIDE 40 MG: 20 INJECTION, SOLUTION EPIDURAL; INFILTRATION; INTRACAUDAL; PERINEURAL at 07:00

## 2017-07-24 RX ADMIN — MIDAZOLAM HYDROCHLORIDE 2 MG: 1 INJECTION, SOLUTION INTRAMUSCULAR; INTRAVENOUS at 06:58

## 2017-07-24 RX ADMIN — ETOMIDATE 16 MG: 2 INJECTION INTRAVENOUS at 06:54

## 2017-07-24 RX ADMIN — SODIUM CITRATE AND CITRIC ACID MONOHYDRATE 30 ML: 500; 334 SOLUTION ORAL at 06:42

## 2017-07-24 RX ADMIN — HYDROXYZINE HYDROCHLORIDE 50 MG: 25 TABLET ORAL at 16:40

## 2017-07-24 RX ADMIN — LEVOMILNACIPRAN HYDROCHLORIDE 80 MG: 40 CAPSULE, EXTENDED RELEASE ORAL at 10:23

## 2017-07-24 RX ADMIN — MIDAZOLAM HYDROCHLORIDE 2 MG: 1 INJECTION, SOLUTION INTRAMUSCULAR; INTRAVENOUS at 06:49

## 2017-07-24 ASSESSMENT — ACTIVITIES OF DAILY LIVING (ADL)
DRESS: INDEPENDENT;STREET CLOTHES
GROOMING: INDEPENDENT
GROOMING: INDEPENDENT;SHOWER
LAUNDRY: WITH SUPERVISION
ORAL_HYGIENE: INDEPENDENT
ORAL_HYGIENE: INDEPENDENT
DRESS: SCRUBS (BEHAVIORAL HEALTH)

## 2017-07-24 ASSESSMENT — LIFESTYLE VARIABLES: TOBACCO_USE: 1

## 2017-07-24 ASSESSMENT — ENCOUNTER SYMPTOMS: SEIZURES: 0

## 2017-07-24 NOTE — PROGRESS NOTES
"Pt very upset this evening, states her mother and sister are fighting with her  about taking care of her kids because her  is struggling to keep up with keeping up with her gone. Pt appeared very sad and flat. Pt states her SI thoughts are not about attempting in the hospital, but once she gets out, it's \"just a matter of time\". Pt states she would like to be alive because she is \"no good to her kids dead\" but that she also feels like she is useless to them when she is in here. Pt is not hopeful that ECT will help because her mother was not helped by the time she has spent trying to figure out her meds and mental illness, so she expects to have the same results.      07/23/17 2000   Behavioral Health   Hallucinations denies / not responding to hallucinations   Thinking intact   Orientation person: oriented;place: oriented;date: oriented;time: oriented   Memory baseline memory   Insight poor   Judgement impaired   Eye Contact at examiner   Affect blunted, flat;sad   Mood depressed;hopeless   Physical Appearance/Attire disheveled   Hygiene other (see comment)  (adequate)   Suicidality thoughts only   Self Injury other (see comment)  (denies)   Elopement (no behaviors noted)   Activity isolative;withdrawn   Speech coherent;clear   Medication Sensitivity no observed side effects;no stated side effects   Psychomotor / Gait steady;balanced   Safety   Suicidality status 15;minimal personal belongings in room;unpredictable frequency of checking patient   Fall fall (yellow) wristband;bed in low position;maintain low level lighting at night;clutter free environment   Coping/Psychosocial   Verbalized Emotional State depression;hopelessness;sadness   Plan Of Care Reviewed With patient   Psycho Education   Type of Intervention 1:1 intervention   Response participates, initiates socially appropriate   Hours 0.5   Treatment Detail Check in   Activities of Daily Living   Hygiene/Grooming independent   Oral Hygiene " independent   Dress independent   Room Organization independent   Activity   Activity Level of Assistance independent

## 2017-07-24 NOTE — ANESTHESIA CARE TRANSFER NOTE
Patient: Nuha Lees    * No procedures listed *    Diagnosis: Severe recurrent major depression without psychotic features (H) [F33.2]  Diagnosis Additional Information: No value filed.    Anesthesia Type:   General, RSI     Note:  Airway :Blow-by  Patient transferred to:PACU        Vitals: (Last set prior to Anesthesia Care Transfer)    CRNA VITALS  7/24/2017 0643 - 7/24/2017 0717      7/24/2017             Pulse: 90    SpO2: 95 %    Resp Rate (set): 10                Electronically Signed By: NATHAN Gerard CRNA  July 24, 2017  7:17 AM

## 2017-07-24 NOTE — PROGRESS NOTES
"Patient's mother Jasmin (PETE signed and in chart) called with concerns about patient. States that patient is not getting any help here and that she needs one on one counseling. Mother is concerned that patient is \"ready to give up.\" Writer reassured mother that patient is receiving treatment and provider is evaluating her progress. Mother is planning on calling in the morning to inquire about counseling resources. Writer checked in with patient who states she is able to stay safe on the unit.  "

## 2017-07-24 NOTE — ANESTHESIA POSTPROCEDURE EVALUATION
Patient: Nuha Lees    * No procedures listed *    Diagnosis:Severe recurrent major depression without psychotic features (H) [F33.2]  Diagnosis Additional Information: No value filed.    Anesthesia Type:  General, RSI    Note:  Anesthesia Post Evaluation    Patient location during evaluation: PACU  Patient participation: Able to fully participate in evaluation  Level of consciousness: awake and alert  Pain management: adequate  Airway patency: patent  Cardiovascular status: hemodynamically stable  Respiratory status: room air and spontaneous ventilation  Hydration status: euvolemic  PONV: none             Last vitals:  Vitals:    07/24/17 0550 07/24/17 0714 07/24/17 0731   BP: 114/71 117/83 (!) 88/41   Pulse: 58     Resp: 18 24 20   Temp: 36.8  C (98.2  F) 37  C (98.6  F)    SpO2: 96% 95% 96%         Electronically Signed By: Baylee Nunez MD  July 24, 2017  7:48 AM

## 2017-07-24 NOTE — PROGRESS NOTES
[facility]   Dr. Lemus's Psychiatric Progress Note  2017      Patient:  Nuha Lees   Medical Record Number:  [id number]  :  1985          Interim History:   The patient's care was discussed with the treatment team and chart notes were reviewed.  Mood is very depressed and anxious.  She isolates on the unit.        Psychiatric ROS:  Mood:  Very depressed and anxious  Sleep:  Too much  Appetite:  poor  Eating:  Not eating much  Energy Level:  poor  Concentration/Memory: poor  Suicidal Thoughts: these continue.   Wrote a suicide note to her  this weekend.  Feels safe on the unit but not when she leaves.    Homicidal Thoughts:No  Psychotic Symptoms: No  Medication Side Effects:  constipation  Medication Compliance:Yes   Physical Complaints:  asthma         Medications:     Current Facility-Administered Medications   Medication     [Auto Hold] lidocaine (PF) (XYLOCAINE) 2 % injection 40 mg     [Auto Hold] midazolam (VERSED) injection 1-2 mg     [Auto Hold] ipratropium - albuterol 0.5 mg/2.5 mg/3 mL (DUONEB) neb solution 3 mL     [Auto Hold] RacEPINEPHrine neb solution 0.5 mL     [Auto Hold] RacEPINEPHrine neb solution 0.5 mL     [Auto Hold] OLANZapine zydis (zyPREXA) ODT tab 5 mg     [Auto Hold] venlafaxine (EFFEXOR-ER) 24 hr tablet 37.5 mg     [Auto Hold] ibuprofen (ADVIL/MOTRIN) tablet 800 mg     [Auto Hold] traZODone (DESYREL) tablet 100 mg     [Auto Hold] albuterol (PROAIR HFA/PROVENTIL HFA/VENTOLIN HFA) Inhaler 2 puff     [Auto Hold] clonazePAM (klonoPIN) tablet 0.5 mg     [Auto Hold] fluticasone-vilanterol (BREO ELLIPTA) 200-25 MCG/INH oral inhaler 1 puff     [Auto Hold] hydrOXYzine (ATARAX) tablet 25-50 mg     [Auto Hold] ipratropium - albuterol 0.5 mg/2.5 mg/3 mL (DUONEB) neb solution 3 mL     [Auto Hold] montelukast (SINGULAIR) tablet 10 mg     [Auto Hold] nicotine (NICODERM CQ) 14 MG/24HR 24 hr patch 1 patch     [Auto Hold] pantoprazole  (PROTONIX) EC tablet 40 mg     [Auto Hold] cholecalciferol (vitamin D3) capsule CAPS 5,000 Units     [Auto Hold] acetaminophen (TYLENOL) tablet 650 mg     [Auto Hold] alum & mag hydroxide-simethicone (MYLANTA ES/MAALOX  ES) suspension 30 mL     [Auto Hold] magnesium hydroxide (MILK OF MAGNESIA) suspension 30 mL     [Auto Hold] nicotine patch REMOVAL     [Auto Hold] nicotine Patch in Place     [Auto Hold] levomilnacipran (FETZIMA ER) 24 hr capsule 80 mg     [Auto Hold] fluticasone (FLONASE) 50 MCG/ACT spray 2 spray             Allergies:     Allergies   Allergen Reactions     Codeine Sulfate      Patient reported tolerated Ultram in the past     Sulfa Drugs      Trintellix [Vortioxetine]             Psychiatric Examination:   /71 (BP Location: Right arm)  Pulse 58  Temp 98.2  F (36.8  C) (Oral)  Resp 18  Wt 81.9 kg (180 lb 8 oz)  SpO2 96%  BMI 30.07 kg/m2  Weight is 180 lbs 8 oz  Body mass index is 30.07 kg/(m^2).    Appearance:  poorly groomed  Attitude:  cooperative  Eye Contact:  good  Mood:  Very depressed and anxious  Affect:  mood congruent  Speech:  clear, coherent  Psychomotor Behavior:  no evidence of tardive dyskinesia, dystonia, or tics  Throught Process:  logical  Associations:  no loose associations  Thought Content:  no evidence of psychotic thought and active suicidal ideation present;  She She says would slit her throat.  Feels safe on the unit and is able to contract for safety.    Insight:  fair  Judgement:  intact  Oriented to:  time, person, and place  Attention Span and Concentration:  intact  Recent and Remote Memory:  intact  Gait:Normal    Risk/Potential for Dangerousness:  Multiple Active Diagnoses:HIGH  Self Care:HIGH  Suicide:HIGH  Assault:LOW  Self Injurious Behaviors:LOW  Inappropriate Sexual Behavior:LOW         Labs:        Recent Results (from the past 1008 hour(s))   EKG 12-lead, complete    Collection Time: 07/13/17  4:51 PM   Result Value Ref Range    Interpretation ECG  Click View Image link to view waveform and result    Potassium    Collection Time: 07/14/17  7:51 AM   Result Value Ref Range    Potassium 3.7 3.4 - 5.3 mmol/L   Hemoglobin    Collection Time: 07/14/17  7:51 AM   Result Value Ref Range    Hemoglobin 14.1 11.7 - 15.7 g/dL   CBC with platelets    Collection Time: 07/17/17  6:09 AM   Result Value Ref Range    WBC 9.2 4.0 - 11.0 10e9/L    RBC Count 5.31 (H) 3.8 - 5.2 10e12/L    Hemoglobin 15.5 11.7 - 15.7 g/dL    Hematocrit 46.9 35.0 - 47.0 %    MCV 88 78 - 100 fl    MCH 29.2 26.5 - 33.0 pg    MCHC 33.0 31.5 - 36.5 g/dL    RDW 12.4 10.0 - 15.0 %    Platelet Count 229 150 - 450 10e9/L   Basic metabolic panel    Collection Time: 07/17/17  6:09 AM   Result Value Ref Range    Sodium 142 133 - 144 mmol/L    Potassium 3.6 3.4 - 5.3 mmol/L    Chloride 107 94 - 109 mmol/L    Carbon Dioxide 24 20 - 32 mmol/L    Anion Gap 11 3 - 14 mmol/L    Glucose 97 70 - 99 mg/dL    Urea Nitrogen 13 7 - 30 mg/dL    Creatinine 1.01 0.52 - 1.04 mg/dL    GFR Estimate 63 >60 mL/min/1.7m2    GFR Estimate If Black 76 >60 mL/min/1.7m2    Calcium 9.1 8.5 - 10.1 mg/dL         Impression:   This is a 31 year old female admitted with worsening depression and suicidal ideation and inability to care for herself.  She's continuing ECT.  Mood remains very depressed.           DIagnoses:      Axis I.  Major depressive disorder, recurrent.  Panic disorder with agoraphobia.  Generalized anxiety disorder.       Axis II. Deferred.       Axis III.  Asthma.             Plan:     Continue tapering off Effexor XR.    Started and titrating Fetzima.  She's up to 80mg.  She completed cytochrome P450 testing last month.  Continue ECT series.   ECT treatment #4 is today.   Treatment #5 will be 7/26/17.    Expect stabilization and discharge back home with her family.   Patient needs to be in the hospital due to here severe depression and suicidality.      Heber Lemus MD

## 2017-07-24 NOTE — PLAN OF CARE
"Problem: Depressive Symptoms  Goal: Depressive Symptoms  ..Pt. Reports absence of suicidal ideation, self-injurious behavior. Pt. Attends and participates in groups. Pt. Engages in 1:1 with staff. Pt. Reports increase in mood and affect. Pt. s appetite is appropriate to current medical status. Pt. Reports sleep is adequate. Pt. Accepts medications without difficulty. Pt. s thoughts appear clear, reality oriented. Pt. s behavior is appropriate with interactions of staff and other pts. ..Pt. Will report side affects of ECT, ie. Confusion, headache, body aches. Pt. Will engage with staff to evaluate any changes in pt. s emotional, physical, and/or mental status.   Outcome: Improving  48 Hour Assessment:  Nuha was up as nicola, had ECT treatment this am. Per PACU/ECT staff, Pt tolerated ECT well today so it is likely that subsequent treatments will be similar to today where patient is sent to PACU at 06:30am on ECT day, to be treated by RT then receive ECT.     Nuha ate breakfast upon her return to unit. Pt took am meds then rested/slept in her room until approximately 14:00pm. Pt denies having pain at this time. Pt also denies feeling anxious. Nuha endorses depression 5/10, denies having suicidal ideation or thoughts of hurting herself. \"I feel safe here, I worry about if I discharge though\". Pt was med compliant, pleasant, and cooperative.       "

## 2017-07-24 NOTE — ANESTHESIA PREPROCEDURE EVALUATION
Anesthesia Evaluation     . Pt has had prior anesthetic. Type: General    History of anesthetic complications    Aspiration and severe bronchospasm requiring intubation      ROS/MED HX    ENT/Pulmonary:     (+)tobacco use, Current use Intermittent asthma Treatment: Inhaler prn,  , . .   (-) sleep apnea and recent URI   Neurologic:      (-) seizures   Cardiovascular:  - neg cardiovascular ROS       METS/Exercise Tolerance:  3 - Able to walk 1-2 blocks without stopping   Hematologic:  - neg hematologic  ROS       Musculoskeletal:  - neg musculoskeletal ROS       GI/Hepatic:     (+) GERD Asymptomatic on medication,       Renal/Genitourinary:  - ROS Renal section negative       Endo:  - neg endo ROS       Psychiatric:     (+) psychiatric history depression      Infectious Disease:  - neg infectious disease ROS       Malignancy:      - no malignancy   Other:    - neg other ROS                 Physical Exam  Normal systems: cardiovascular, pulmonary and dental    Airway   Mallampati: II  TM distance: >3 FB  Neck ROM: full    Dental     Cardiovascular       Pulmonary                     Anesthesia Plan      History & Physical Review  History and physical reviewed and following examination; no interval change.    ASA Status:  3 .        Plan for General, RSI and ETT with Intravenous induction.     Additional equipment: Videolaryngoscope      Postoperative Care      Consents  Anesthetic plan, risks, benefits and alternatives discussed with:  Patient..                          .

## 2017-07-24 NOTE — PROCEDURES
Procedure/Surgery Information   [facility], [place]    Bedside Procedure Note  Date of Service (when I performed the procedure): 07/24/2017    Nuha Lees is a 31 year old female patient.  1. Severe recurrent major depression without psychotic features (H)      Past Medical History:   Diagnosis Date     Asthma      Temp: 98.2  F (36.8  C) Temp src: Oral BP: 114/71 Pulse: 58   Resp: 18 SpO2: 96 % O2 Device: None (Room air)      Procedures     Heber Lemus     [facility], [place]   ECT Procedure Note  07/24/2017    Nuha Lees [id number]   31 year old 1985     Patient Status: Inpatient    Is this the first in a series of 12 treatments?  No    History and Physical: Reviewed in medical record    Consent: Informed consent was signed by: patient    Date Consent Signed: 7/17/17      Allergies   Allergen Reactions     Codeine Sulfate      Patient reported tolerated Ultram in the past     Sulfa Drugs      Trintellix [Vortioxetine]        Weight:  180 lbs 8 oz    Patient Preparations: Glasses/Contacts removed         Indications for ECT:   Medications ineffective and Imminent suicide risk         Clinical Narrative:   Patient was admitted with worsening depression and suicidal ideation.  NPO after 2400.  Alert and Oriented x 3.  She has a little memory loss.  Mood is very depressed.   Discussed switching to Bilateral ECT today to improve on efficacy.  She wants to do this despite risk of more forgetfulness.           Diagnosis:   Major depression (F33.2)         Pause for the Cause:     Right patient Yes   Right procedure/laterality settings: Yes          Intra-Procedure Documentation:       ECT #: 4   Treatment number this series: 4   Total treatment number: 4     Type of ECT:  BILATERAL    ECT Medications:    Zyprexa Zydis:  5mg po before coming to ECT  Duoneb  Racemic Epi Nebulizer  Bicitra: 30cc  Zofran: 4mg  Toradol 30mg for HA  Etomidate:  16mg  Succinyl Choline: 100mg   Versed:  2mg for post ECT agitation + may repeat 2mg     ECT Strip Summary:   Energy Level: 35 percent  Motor Seizure Duration:   57 seconds  EEG Seizure Duration:    57 seconds    Complications: No    Plan:   Next ECT 7/26/19.  Bring her to PACU at 6:30 AM.      Heber Lemus MD

## 2017-07-25 PROCEDURE — 25000132 ZZH RX MED GY IP 250 OP 250 PS 637: Performed by: PSYCHIATRY & NEUROLOGY

## 2017-07-25 PROCEDURE — 12400007 ZZH R&B MH INTERMEDIATE UMMC

## 2017-07-25 PROCEDURE — 97150 GROUP THERAPEUTIC PROCEDURES: CPT | Mod: GO

## 2017-07-25 PROCEDURE — 90853 GROUP PSYCHOTHERAPY: CPT

## 2017-07-25 RX ADMIN — LEVOMILNACIPRAN HYDROCHLORIDE 80 MG: 40 CAPSULE, EXTENDED RELEASE ORAL at 08:55

## 2017-07-25 RX ADMIN — HYDROXYZINE HYDROCHLORIDE 50 MG: 25 TABLET ORAL at 12:30

## 2017-07-25 RX ADMIN — VENLAFAXINE HYDROCHLORIDE 37.5 MG: 37.5 TABLET, EXTENDED RELEASE ORAL at 08:55

## 2017-07-25 RX ADMIN — PANTOPRAZOLE SODIUM 40 MG: 40 TABLET, DELAYED RELEASE ORAL at 08:56

## 2017-07-25 RX ADMIN — CLONAZEPAM 0.5 MG: 0.5 TABLET ORAL at 08:55

## 2017-07-25 RX ADMIN — CLONAZEPAM 0.5 MG: 0.5 TABLET ORAL at 14:15

## 2017-07-25 RX ADMIN — CHOLECALCIFEROL CAP 125 MCG (5000 UNIT) 5000 UNITS: 125 CAP at 08:55

## 2017-07-25 RX ADMIN — FLUTICASONE FUROATE AND VILANTEROL TRIFENATATE 1 PUFF: 200; 25 POWDER RESPIRATORY (INHALATION) at 08:59

## 2017-07-25 RX ADMIN — NICOTINE 1 PATCH: 14 PATCH, EXTENDED RELEASE TRANSDERMAL at 08:55

## 2017-07-25 RX ADMIN — MONTELUKAST SODIUM 10 MG: 10 TABLET, FILM COATED ORAL at 21:01

## 2017-07-25 RX ADMIN — OLANZAPINE 5 MG: 5 TABLET, ORALLY DISINTEGRATING ORAL at 08:55

## 2017-07-25 RX ADMIN — FLUTICASONE PROPIONATE 2 SPRAY: 50 SPRAY, METERED NASAL at 08:59

## 2017-07-25 RX ADMIN — TRAZODONE HYDROCHLORIDE 100 MG: 100 TABLET ORAL at 21:01

## 2017-07-25 ASSESSMENT — ACTIVITIES OF DAILY LIVING (ADL)
ORAL_HYGIENE: INDEPENDENT
ORAL_HYGIENE: INDEPENDENT
DRESS: INDEPENDENT
GROOMING: INDEPENDENT
GROOMING: INDEPENDENT

## 2017-07-25 NOTE — PROGRESS NOTES
"Pt was out in the milieu, but had minimal social interactions with other pateints. Pt affect was blunt/flat and a bit sedated. Pt stated that she \"just feels tired\" when asked about her mood. Pt denied SI/SIB. Pt was calm and cooperative. Pt went to bed early tonight. Pt was med compliant. No physical health concerns at this time.   "

## 2017-07-25 NOTE — PROGRESS NOTES
The patient complied and cooperated with staff during the morning shift.  She states that her ECT went very well and brought her anxiety and depression down significantly.  Patient states no thoughts of SIB.  She also completed her goals for the day which included making her bed and waking up on time.  Patient has full range of affect.       07/25/17 1129   Behavioral Health   Affect full range affect   Mood mood is calm   Physical Appearance/Attire attire appropriate to age and situation   Hygiene well groomed   Suicidality other (see comments)  (Denies)   Self Injury other (see comment)  (Denies)   Activity other (see comment)  (Active and Visible in milieu)   Speech clear;coherent   Medication Sensitivity no stated side effects;no observed side effects   Psychomotor / Gait balanced;steady   Activities of Daily Living   Hygiene/Grooming independent   Oral Hygiene independent   Dress independent   Room Organization independent   Activity   Activity Level of Assistance independent   Behavioral Health Interventions   Depression encourage nutrition and hydration;encourage participation / independence with adls;provide emotional support;establish therapeutic relationship;build upon strengths;monitor confusion, memory loss, decision making ability and reorient / intervene as needed;assess & implement appropriate substance withdrawal protocol;maintain safe secure environment   Social and Therapeutic Interventions (Depression) encourage socialization with peers

## 2017-07-25 NOTE — PLAN OF CARE
Problem: Depressive Symptoms  Goal: Social and Therapeutic (Depression)  ..Pt. Engages appropriately with staff and other pts. Pt. Responds to redirection as indicated. Pt. Attends and engages in group in a therapeutic manner. Pt. Able to make requests of staff for daily needs, concerns, questions, medications. Pt. Completes ADL s Without difficulty.         Patient attended 3 of 3 scheduled OT sessions today. Pt. Actively participated in goal directed task session. Pt.attended a Goal Setting session and actively participated in a discussion of guidelines for setting goals.  Pt. Identified a priority goal and action steps to accomplish that goal. Pt was quick to engage in group game activity.  Demonstrated an understanding of the rules  and strategy of the game.  Appeared to enjoy the group interaction. Social and organized throughout sessions.  Pt.was cooperative and pleasant throughout session.

## 2017-07-26 ENCOUNTER — ANESTHESIA (OUTPATIENT)
Dept: SURGERY | Facility: CLINIC | Age: 32
End: 2017-07-26

## 2017-07-26 ENCOUNTER — SURGERY (OUTPATIENT)
Age: 32
End: 2017-07-26

## 2017-07-26 PROCEDURE — 37000008 ZZH ANESTHESIA TECHNICAL FEE, 1ST 30 MIN

## 2017-07-26 PROCEDURE — 25000125 ZZHC RX 250: Performed by: NURSE ANESTHETIST, CERTIFIED REGISTERED

## 2017-07-26 PROCEDURE — 25000132 ZZH RX MED GY IP 250 OP 250 PS 637: Performed by: PSYCHIATRY & NEUROLOGY

## 2017-07-26 PROCEDURE — 40000170 ZZH STATISTIC PRE-PROCEDURE ASSESSMENT II

## 2017-07-26 PROCEDURE — 94640 AIRWAY INHALATION TREATMENT: CPT

## 2017-07-26 PROCEDURE — 25000128 H RX IP 250 OP 636: Performed by: NURSE ANESTHETIST, CERTIFIED REGISTERED

## 2017-07-26 PROCEDURE — 12400007 ZZH R&B MH INTERMEDIATE UMMC

## 2017-07-26 PROCEDURE — 25000128 H RX IP 250 OP 636: Performed by: PSYCHIATRY & NEUROLOGY

## 2017-07-26 PROCEDURE — 90870 ELECTROCONVULSIVE THERAPY: CPT

## 2017-07-26 PROCEDURE — 40000275 ZZH STATISTIC RCP TIME EA 10 MIN

## 2017-07-26 PROCEDURE — 71000014 ZZH RECOVERY PHASE 1 LEVEL 2 FIRST HR

## 2017-07-26 PROCEDURE — 25000125 ZZHC RX 250: Performed by: PSYCHIATRY & NEUROLOGY

## 2017-07-26 RX ORDER — IPRATROPIUM BROMIDE AND ALBUTEROL SULFATE 2.5; .5 MG/3ML; MG/3ML
3 SOLUTION RESPIRATORY (INHALATION)
Status: COMPLETED | OUTPATIENT
Start: 2017-07-26 | End: 2017-07-26

## 2017-07-26 RX ORDER — KETOROLAC TROMETHAMINE 30 MG/ML
30 INJECTION, SOLUTION INTRAMUSCULAR; INTRAVENOUS
Status: COMPLETED | OUTPATIENT
Start: 2017-07-26 | End: 2017-07-26

## 2017-07-26 RX ORDER — LIDOCAINE HYDROCHLORIDE 20 MG/ML
40 INJECTION, SOLUTION EPIDURAL; INFILTRATION; INTRACAUDAL; PERINEURAL
Status: ACTIVE | OUTPATIENT
Start: 2017-07-26 | End: 2017-07-26

## 2017-07-26 RX ORDER — ONDANSETRON 2 MG/ML
4 INJECTION INTRAMUSCULAR; INTRAVENOUS
Status: COMPLETED | OUTPATIENT
Start: 2017-07-26 | End: 2017-07-26

## 2017-07-26 RX ORDER — SODIUM CHLORIDE, SODIUM LACTATE, POTASSIUM CHLORIDE, CALCIUM CHLORIDE 600; 310; 30; 20 MG/100ML; MG/100ML; MG/100ML; MG/100ML
INJECTION, SOLUTION INTRAVENOUS CONTINUOUS PRN
Status: DISCONTINUED | OUTPATIENT
Start: 2017-07-26 | End: 2017-07-26

## 2017-07-26 RX ORDER — LIDOCAINE HYDROCHLORIDE 20 MG/ML
INJECTION, SOLUTION INFILTRATION; PERINEURAL PRN
Status: DISCONTINUED | OUTPATIENT
Start: 2017-07-26 | End: 2017-07-26

## 2017-07-26 RX ORDER — ETOMIDATE 2 MG/ML
INJECTION INTRAVENOUS PRN
Status: DISCONTINUED | OUTPATIENT
Start: 2017-07-26 | End: 2017-07-26

## 2017-07-26 RX ORDER — CITRIC ACID/SODIUM CITRATE 334-500MG
30 SOLUTION, ORAL ORAL
Status: COMPLETED | OUTPATIENT
Start: 2017-07-26 | End: 2017-07-26

## 2017-07-26 RX ADMIN — MIDAZOLAM HYDROCHLORIDE 2 MG: 1 INJECTION, SOLUTION INTRAMUSCULAR; INTRAVENOUS at 07:03

## 2017-07-26 RX ADMIN — ONDANSETRON 4 MG: 2 INJECTION INTRAMUSCULAR; INTRAVENOUS at 06:31

## 2017-07-26 RX ADMIN — NICOTINE 1 PATCH: 14 PATCH, EXTENDED RELEASE TRANSDERMAL at 09:22

## 2017-07-26 RX ADMIN — LEVOMILNACIPRAN HYDROCHLORIDE 80 MG: 40 CAPSULE, EXTENDED RELEASE ORAL at 09:21

## 2017-07-26 RX ADMIN — HYDROXYZINE HYDROCHLORIDE 50 MG: 25 TABLET ORAL at 11:35

## 2017-07-26 RX ADMIN — Medication 100 MG: at 06:55

## 2017-07-26 RX ADMIN — CLONAZEPAM 0.5 MG: 0.5 TABLET ORAL at 14:22

## 2017-07-26 RX ADMIN — RACEPINEPHRINE HYDROCHLORIDE 0.5 ML: 11.25 SOLUTION RESPIRATORY (INHALATION) at 06:27

## 2017-07-26 RX ADMIN — IBUPROFEN 800 MG: 800 TABLET ORAL at 11:35

## 2017-07-26 RX ADMIN — CLONAZEPAM 0.5 MG: 0.5 TABLET ORAL at 20:47

## 2017-07-26 RX ADMIN — LIDOCAINE HYDROCHLORIDE 40 MG: 20 INJECTION, SOLUTION INFILTRATION; PERINEURAL at 06:55

## 2017-07-26 RX ADMIN — TRAZODONE HYDROCHLORIDE 100 MG: 100 TABLET ORAL at 20:47

## 2017-07-26 RX ADMIN — VENLAFAXINE HYDROCHLORIDE 37.5 MG: 37.5 TABLET, EXTENDED RELEASE ORAL at 09:21

## 2017-07-26 RX ADMIN — ETOMIDATE 16 MG: 2 INJECTION INTRAVENOUS at 06:55

## 2017-07-26 RX ADMIN — IPRATROPIUM BROMIDE AND ALBUTEROL SULFATE 3 ML: .5; 3 SOLUTION RESPIRATORY (INHALATION) at 06:26

## 2017-07-26 RX ADMIN — IBUPROFEN 800 MG: 800 TABLET ORAL at 21:17

## 2017-07-26 RX ADMIN — SODIUM CHLORIDE, POTASSIUM CHLORIDE, SODIUM LACTATE AND CALCIUM CHLORIDE: 600; 310; 30; 20 INJECTION, SOLUTION INTRAVENOUS at 06:47

## 2017-07-26 RX ADMIN — OLANZAPINE 5 MG: 5 TABLET, ORALLY DISINTEGRATING ORAL at 05:39

## 2017-07-26 RX ADMIN — FLUTICASONE PROPIONATE 2 SPRAY: 50 SPRAY, METERED NASAL at 09:23

## 2017-07-26 RX ADMIN — PANTOPRAZOLE SODIUM 40 MG: 40 TABLET, DELAYED RELEASE ORAL at 05:39

## 2017-07-26 RX ADMIN — MIDAZOLAM HYDROCHLORIDE 2 MG: 1 INJECTION, SOLUTION INTRAMUSCULAR; INTRAVENOUS at 07:00

## 2017-07-26 RX ADMIN — SODIUM CITRATE AND CITRIC ACID MONOHYDRATE 30 ML: 500; 334 SOLUTION ORAL at 06:31

## 2017-07-26 RX ADMIN — KETOROLAC TROMETHAMINE 30 MG: 30 INJECTION, SOLUTION INTRAMUSCULAR at 06:31

## 2017-07-26 RX ADMIN — ALBUTEROL SULFATE 2 PUFF: 90 AEROSOL, METERED RESPIRATORY (INHALATION) at 05:42

## 2017-07-26 RX ADMIN — MONTELUKAST SODIUM 10 MG: 10 TABLET, FILM COATED ORAL at 20:47

## 2017-07-26 ASSESSMENT — ACTIVITIES OF DAILY LIVING (ADL)
LAUNDRY: WITH SUPERVISION
ORAL_HYGIENE: INDEPENDENT
DRESS: INDEPENDENT
GROOMING: INDEPENDENT

## 2017-07-26 ASSESSMENT — LIFESTYLE VARIABLES: TOBACCO_USE: 1

## 2017-07-26 NOTE — ANESTHESIA POSTPROCEDURE EVALUATION
Patient: Nuha Lees    Procedure(s):  Electroconvulsive Therapy with Dr Lemus     Diagnosis:Severe recurrent major depression without psychotic features (H) [F33.2]  Diagnosis Additional Information: No value filed.    Anesthesia Type:  General, RSI, ETT    Note:  Anesthesia Post Evaluation    Patient location during evaluation: PACU and Bedside  Patient participation: Able to fully participate in evaluation  Level of consciousness: awake  Pain management: adequate  Airway patency: patent  Cardiovascular status: acceptable  Respiratory status: acceptable  Hydration status: acceptable  PONV: none     Anesthetic complications: None          Last vitals:  Vitals:    07/26/17 0715 07/26/17 0730 07/26/17 0740   BP: 108/60 98/65    Pulse:      Resp: 18 15 21   Temp:   37.1  C (98.8  F)   SpO2: 94% 96% 96%         Electronically Signed By: Rachel Willard MD  July 26, 2017  7:43 AM

## 2017-07-26 NOTE — PROCEDURES
Procedure/Surgery Information   [facility], [place]    Bedside Procedure Note  Date of Service (when I performed the procedure): 07/26/2017    Nuha Lees is a 31 year old female patient.  1. Severe recurrent major depression without psychotic features (H)      Past Medical History:   Diagnosis Date     Asthma      Temp: 98.4  F (36.9  C) Temp src: Oral BP: 104/70 Pulse: 81   Resp: 18          Procedures     Heber Lemus     [facility], [place]   ECT Procedure Note  07/26/2017    Nuha Lees [id number]   31 year old 1985     Patient Status: Inpatient    Is this the first in a series of 12 treatments?  No    History and Physical: Reviewed in medical record    Consent: Informed consent was signed by: patient    Date Consent Signed: 7/17/17      Allergies   Allergen Reactions     Codeine Sulfate      Patient reported tolerated Ultram in the past     Sulfa Drugs      Trintellix [Vortioxetine]        Weight:  180 lbs 14.4 oz    Patient Preparations: Glasses/Contacts removed         Indications for ECT:   Medications ineffective and Imminent suicide risk         Clinical Narrative:   Patient was admitted with worsening depression and suicidal ideation.  NPO after 2400.  Alert and Oriented x 3.  Mood is better today after her last bilateral treatment.  Denies problems from that treatment.           Diagnosis:   Major depression (F33.2)         Pause for the Cause:     Right patient Yes   Right procedure/laterality settings: Yes          Intra-Procedure Documentation:       ECT #: 5   Treatment number this series: 5   Total treatment number: 5     Type of ECT:  BILATERAL    ECT Medications:    Zyprexa Zydis:  5mg po before coming to ECT  Duoneb  Racemic Epi Nebulizer  Bicitra: 30cc  Zofran: 4mg  Toradol 30mg for HA  Etomidate: 16mg  Succinyl Choline: 100mg   Versed:  2mg for post ECT agitation + may repeat 2mg     ECT Strip Summary:   Energy  Level: 35 percent  Motor Seizure Duration:   49 seconds  EEG Seizure Duration:    58 seconds    Complications: No    Plan:   Next ECT 7/26/19.  Bring her to PACU at 6:30 AM.      Heber Lemus MD

## 2017-07-26 NOTE — ANESTHESIA CARE TRANSFER NOTE
Patient: Nuha Lees    Procedure(s):  Electroconvulsive Therapy with Dr Lemus     Diagnosis: n/a  Diagnosis Additional Information: No value filed.    Anesthesia Type:   General, RSI, ETT     Note:  Airway :Blow-by  Patient transferred to:PACU  Comments: Pt remains in PACU, VSS.  Report to RN, questions answered.       Vitals: (Last set prior to Anesthesia Care Transfer)    CRNA VITALS  7/26/2017 0642 - 7/26/2017 0713      7/26/2017             Resp Rate (set): 10                Electronically Signed By: NATHAN Navarro CRNA  July 26, 2017  7:13 AM

## 2017-07-26 NOTE — OR NURSING
Patient waking appropriately. VSS, lungs clear, answering questions appropriately and is alert. Plan to return patient to station 32. 1:1 attendant here in PACU and he will transfer patient.

## 2017-07-26 NOTE — PROGRESS NOTES
"[facility]   Dr. Lemus's Psychiatric Progress Note  2017      Patient:  Nuha Lees   Medical Record Number:  [id number]  :  1985          Interim History:   The patient's care was discussed with the treatment team and chart notes were reviewed.  Mood is much better today compared to admission.  She tolerated the bilateral ECT on Monday and thinks that helped.      Psychiatric ROS:  Mood:  Better;  Less depressed and less anxious  Sleep:  More normal  Appetite:  More normal  Eating:  fair  Energy Level:  Coming back  Concentration/Memory: better  Suicidal Thoughts:  None today \"for the first time in a long time\"    Homicidal Thoughts:No  Psychotic Symptoms: No  Medication Side Effects:  constipation  Medication Compliance:Yes   Physical Complaints:  asthma         Medications:     Current Facility-Administered Medications   Medication     [Auto Hold] lidocaine (PF) (XYLOCAINE) 2 % injection 40 mg     [Auto Hold] midazolam (VERSED) injection 1-2 mg     [Auto Hold] midazolam (VERSED) injection 1-2 mg     [Auto Hold] RacEPINEPHrine neb solution 0.5 mL     [Auto Hold] OLANZapine zydis (zyPREXA) ODT tab 5 mg     [Auto Hold] venlafaxine (EFFEXOR-ER) 24 hr tablet 37.5 mg     [Auto Hold] ibuprofen (ADVIL/MOTRIN) tablet 800 mg     [Auto Hold] traZODone (DESYREL) tablet 100 mg     [Auto Hold] albuterol (PROAIR HFA/PROVENTIL HFA/VENTOLIN HFA) Inhaler 2 puff     [Auto Hold] clonazePAM (klonoPIN) tablet 0.5 mg     [Auto Hold] fluticasone-vilanterol (BREO ELLIPTA) 200-25 MCG/INH oral inhaler 1 puff     [Auto Hold] hydrOXYzine (ATARAX) tablet 25-50 mg     [Auto Hold] ipratropium - albuterol 0.5 mg/2.5 mg/3 mL (DUONEB) neb solution 3 mL     [Auto Hold] montelukast (SINGULAIR) tablet 10 mg     [Auto Hold] nicotine (NICODERM CQ) 14 MG/24HR 24 hr patch 1 patch     [Auto Hold] pantoprazole (PROTONIX) EC tablet 40 mg     [Auto Hold] cholecalciferol (vitamin D3) capsule CAPS 5,000 " Units     [Auto Hold] acetaminophen (TYLENOL) tablet 650 mg     [Auto Hold] alum & mag hydroxide-simethicone (MYLANTA ES/MAALOX  ES) suspension 30 mL     [Auto Hold] magnesium hydroxide (MILK OF MAGNESIA) suspension 30 mL     [Auto Hold] nicotine patch REMOVAL     [Auto Hold] nicotine Patch in Place     [Auto Hold] levomilnacipran (FETZIMA ER) 24 hr capsule 80 mg     [Auto Hold] fluticasone (FLONASE) 50 MCG/ACT spray 2 spray     Facility-Administered Medications Ordered in Other Encounters   Medication     lactated ringers infusion     lidocaine injection 2% (MDV)             Allergies:     Allergies   Allergen Reactions     Codeine Sulfate      Patient reported tolerated Ultram in the past     Sulfa Drugs      Trintellix [Vortioxetine]             Psychiatric Examination:   /70 (BP Location: Right arm)  Pulse 81  Temp 98.4  F (36.9  C) (Oral)  Resp 18  Wt 82.1 kg (180 lb 14.4 oz)  SpO2 95%  BMI 30.14 kg/m2  Weight is 180 lbs 14.4 oz  Body mass index is 30.14 kg/(m^2).    Appearance:  poorly groomed  Attitude:  cooperative  Eye Contact:  good  Mood:  Better;  Less depressed and less anxious  Affect:  mood congruent  Speech:  clear, coherent  Psychomotor Behavior:  no evidence of tardive dyskinesia, dystonia, or tics  Throught Process:  logical  Associations:  no loose associations  Thought Content:  No psychosis;  No SI today    Insight:  fair  Judgement:  intact  Oriented to:  time, person, and place  Attention Span and Concentration:  intact  Recent and Remote Memory:  intact  Gait:Normal    Risk/Potential for Dangerousness:  Multiple Active Diagnoses:HIGH  Self Care:HIGH  Suicide:HIGH  Assault:LOW  Self Injurious Behaviors:LOW  Inappropriate Sexual Behavior:LOW         Labs:        Recent Results (from the past 1008 hour(s))   EKG 12-lead, complete    Collection Time: 07/13/17  4:51 PM   Result Value Ref Range    Interpretation ECG Click View Image link to view waveform and result    Potassium     Collection Time: 07/14/17  7:51 AM   Result Value Ref Range    Potassium 3.7 3.4 - 5.3 mmol/L   Hemoglobin    Collection Time: 07/14/17  7:51 AM   Result Value Ref Range    Hemoglobin 14.1 11.7 - 15.7 g/dL   CBC with platelets    Collection Time: 07/17/17  6:09 AM   Result Value Ref Range    WBC 9.2 4.0 - 11.0 10e9/L    RBC Count 5.31 (H) 3.8 - 5.2 10e12/L    Hemoglobin 15.5 11.7 - 15.7 g/dL    Hematocrit 46.9 35.0 - 47.0 %    MCV 88 78 - 100 fl    MCH 29.2 26.5 - 33.0 pg    MCHC 33.0 31.5 - 36.5 g/dL    RDW 12.4 10.0 - 15.0 %    Platelet Count 229 150 - 450 10e9/L   Basic metabolic panel    Collection Time: 07/17/17  6:09 AM   Result Value Ref Range    Sodium 142 133 - 144 mmol/L    Potassium 3.6 3.4 - 5.3 mmol/L    Chloride 107 94 - 109 mmol/L    Carbon Dioxide 24 20 - 32 mmol/L    Anion Gap 11 3 - 14 mmol/L    Glucose 97 70 - 99 mg/dL    Urea Nitrogen 13 7 - 30 mg/dL    Creatinine 1.01 0.52 - 1.04 mg/dL    GFR Estimate 63 >60 mL/min/1.7m2    GFR Estimate If Black 76 >60 mL/min/1.7m2    Calcium 9.1 8.5 - 10.1 mg/dL         Impression:   This is a 31 year old female admitted with worsening depression and suicidal ideation and inability to care for herself.  She's continuing ECT.  Mood is starting to improve.           DIagnoses:      Axis I.  Major depressive disorder, recurrent.  Panic disorder with agoraphobia.  Generalized anxiety disorder.       Axis II. Deferred.       Axis III.  Asthma.             Plan:     Continue tapering off Effexor XR.    Started and titrating Fetzima.  She's up to 80mg.  She completed cytochrome P450 testing last month.  Continue ECT series.   ECT treatment #5 is today.   Treatment #6 will be 7/28/17.    Expect stabilization and discharge back home with her family.   Patient needs to be in the hospital due to here severe depression and suicidality.      Heber Lemus MD

## 2017-07-26 NOTE — ANESTHESIA PREPROCEDURE EVALUATION
Anesthesia Evaluation     . Pt has had prior anesthetic. Type: General    History of anesthetic complications    Severe bronchospasm, high aspiration risk      ROS/MED HX    ENT/Pulmonary:     (+)tobacco use, asthma , . .    Neurologic:       Cardiovascular:         METS/Exercise Tolerance:     Hematologic:         Musculoskeletal:         GI/Hepatic:     (+) GERD Asymptomatic on medication,       Renal/Genitourinary:         Endo:         Psychiatric:     (+) psychiatric history depression      Infectious Disease:         Malignancy:         Other:                                    Anesthesia Plan      History & Physical Review      ASA Status:  2 .        Plan for General, RSI and ETT with Intravenous induction. Maintenance will be Balanced.      Additional equipment: Videolaryngoscope      Postoperative Care      Consents                          .  Anesthesia Evaluation     . Pt has had prior anesthetic. Type: General    History of anesthetic complications    Aspiration and severe bronchospasm requiring intubation      ROS/MED HX    ENT/Pulmonary:     (+)tobacco use, Current use Intermittent asthma Treatment: Inhaler prn,  , . .   (-) sleep apnea and recent URI   Neurologic:      (-) seizures   Cardiovascular:  - neg cardiovascular ROS       METS/Exercise Tolerance:  3 - Able to walk 1-2 blocks without stopping   Hematologic:  - neg hematologic  ROS       Musculoskeletal:  - neg musculoskeletal ROS       GI/Hepatic:     (+) GERD Asymptomatic on medication,       Renal/Genitourinary:  - ROS Renal section negative       Endo:  - neg endo ROS       Psychiatric:     (+) psychiatric history depression      Infectious Disease:  - neg infectious disease ROS       Malignancy:      - no malignancy   Other:    - neg other ROS                 Physical Exam  Normal systems: cardiovascular, pulmonary and dental    Airway   Mallampati: II  TM distance: >3 FB  Neck ROM: full    Dental     Cardiovascular       Pulmonary                      Anesthesia Plan      History & Physical Review  History and physical reviewed and following examination; no interval change.    ASA Status:  3 .        Plan for General, RSI and ETT with Intravenous induction.     Additional equipment: Videolaryngoscope      Postoperative Care      Consents  Anesthetic plan, risks, benefits and alternatives discussed with:  Patient..                          .

## 2017-07-27 ENCOUNTER — ANESTHESIA EVENT (OUTPATIENT)
Dept: BEHAVIORAL HEALTH | Facility: CLINIC | Age: 32
End: 2017-07-27

## 2017-07-27 PROCEDURE — 25000132 ZZH RX MED GY IP 250 OP 250 PS 637: Performed by: PSYCHIATRY & NEUROLOGY

## 2017-07-27 PROCEDURE — 90853 GROUP PSYCHOTHERAPY: CPT

## 2017-07-27 PROCEDURE — 12400007 ZZH R&B MH INTERMEDIATE UMMC

## 2017-07-27 RX ADMIN — CHOLECALCIFEROL CAP 125 MCG (5000 UNIT) 5000 UNITS: 125 CAP at 08:20

## 2017-07-27 RX ADMIN — MONTELUKAST SODIUM 10 MG: 10 TABLET, FILM COATED ORAL at 20:29

## 2017-07-27 RX ADMIN — TRAZODONE HYDROCHLORIDE 100 MG: 100 TABLET ORAL at 20:29

## 2017-07-27 RX ADMIN — VENLAFAXINE HYDROCHLORIDE 37.5 MG: 37.5 TABLET, EXTENDED RELEASE ORAL at 08:23

## 2017-07-27 RX ADMIN — PANTOPRAZOLE SODIUM 40 MG: 40 TABLET, DELAYED RELEASE ORAL at 08:23

## 2017-07-27 RX ADMIN — CLONAZEPAM 0.5 MG: 0.5 TABLET ORAL at 14:12

## 2017-07-27 RX ADMIN — NICOTINE 1 PATCH: 14 PATCH, EXTENDED RELEASE TRANSDERMAL at 08:22

## 2017-07-27 RX ADMIN — FLUTICASONE PROPIONATE 2 SPRAY: 50 SPRAY, METERED NASAL at 08:21

## 2017-07-27 RX ADMIN — ACETAMINOPHEN 650 MG: 325 TABLET, FILM COATED ORAL at 20:29

## 2017-07-27 RX ADMIN — CLONAZEPAM 0.5 MG: 0.5 TABLET ORAL at 20:29

## 2017-07-27 RX ADMIN — FLUTICASONE FUROATE AND VILANTEROL TRIFENATATE 1 PUFF: 200; 25 POWDER RESPIRATORY (INHALATION) at 09:57

## 2017-07-27 RX ADMIN — OLANZAPINE 5 MG: 5 TABLET, ORALLY DISINTEGRATING ORAL at 08:23

## 2017-07-27 RX ADMIN — CLONAZEPAM 0.5 MG: 0.5 TABLET ORAL at 08:20

## 2017-07-27 RX ADMIN — HYDROXYZINE HYDROCHLORIDE 50 MG: 25 TABLET ORAL at 12:05

## 2017-07-27 RX ADMIN — LEVOMILNACIPRAN HYDROCHLORIDE 80 MG: 40 CAPSULE, EXTENDED RELEASE ORAL at 08:22

## 2017-07-27 ASSESSMENT — ACTIVITIES OF DAILY LIVING (ADL)
DRESS: STREET CLOTHES;INDEPENDENT
DRESS: STREET CLOTHES
GROOMING: HANDWASHING;SHOWER;INDEPENDENT
GROOMING: INDEPENDENT
ORAL_HYGIENE: INDEPENDENT
LAUNDRY: UNABLE TO COMPLETE
LAUNDRY: WITH SUPERVISION
ORAL_HYGIENE: INDEPENDENT

## 2017-07-27 NOTE — PROGRESS NOTES
Plan to do ECT tomorrow and then likely discharge in the afternoon or early evening of Fri. 7/29/17.    Heber Lemus MD

## 2017-07-27 NOTE — PROGRESS NOTES
VM from pt's sister, Hallie. She is watching pt's daughters. Last night pt called and advised the girls that she is discharging today or tomorrow. [phone].  CTC called sister. Advised that pt is not discharging today and needs to talk to the doctor tomorrow about when she will discharge. Sister states that pt seemed almost 'high' last night when talking to them.

## 2017-07-27 NOTE — PLAN OF CARE
Problem: Depressive Symptoms  Goal: Depressive Symptoms  ..Pt. Reports absence of suicidal ideation, self-injurious behavior. Pt. Attends and participates in groups. Pt. Engages in 1:1 with staff. Pt. Reports increase in mood and affect. Pt. s appetite is appropriate to current medical status. Pt. Reports sleep is adequate. Pt. Accepts medications without difficulty. Pt. s thoughts appear clear, reality oriented. Pt. s behavior is appropriate with interactions of staff and other pts. ..Pt. Will report side affects of ECT, ie. Confusion, headache, body aches. Pt. Will engage with staff to evaluate any changes in pt. s emotional, physical, and/or mental status.   Outcome: Improving  48 hour: Pt has presented as pleasant and cooperative thru out the evening. Pt denies sI and states that she feels the ECT could be beneficial. Pt easily redirectable thru out the evening.

## 2017-07-27 NOTE — PROGRESS NOTES
Participated in topic group discussion looking at making poor choices that increase stress.  Identified  Isolating as issue for her and needs to be more intentional about calling her family.l

## 2017-07-27 NOTE — PROGRESS NOTES
Patient was asking to be discharged today. Dr. Lemus notified and said no patient cannot be discharged today per Brianda KIM charge nurse.

## 2017-07-28 ENCOUNTER — SURGERY (OUTPATIENT)
Age: 32
End: 2017-07-28

## 2017-07-28 ENCOUNTER — ANESTHESIA (OUTPATIENT)
Dept: BEHAVIORAL HEALTH | Facility: CLINIC | Age: 32
End: 2017-07-28

## 2017-07-28 VITALS
TEMPERATURE: 95.8 F | BODY MASS INDEX: 29.86 KG/M2 | WEIGHT: 179.2 LBS | OXYGEN SATURATION: 92 % | DIASTOLIC BLOOD PRESSURE: 74 MMHG | SYSTOLIC BLOOD PRESSURE: 106 MMHG | RESPIRATION RATE: 14 BRPM | HEART RATE: 120 BPM

## 2017-07-28 PROCEDURE — 90870 ELECTROCONVULSIVE THERAPY: CPT

## 2017-07-28 PROCEDURE — 40000170 ZZH STATISTIC PRE-PROCEDURE ASSESSMENT II

## 2017-07-28 PROCEDURE — 94640 AIRWAY INHALATION TREATMENT: CPT

## 2017-07-28 PROCEDURE — 25000125 ZZHC RX 250: Performed by: PSYCHIATRY & NEUROLOGY

## 2017-07-28 PROCEDURE — 25000128 H RX IP 250 OP 636: Performed by: PSYCHIATRY & NEUROLOGY

## 2017-07-28 PROCEDURE — GZB2ZZZ ELECTROCONVULSIVE THERAPY, BILATERAL-SINGLE SEIZURE: ICD-10-PCS | Performed by: PSYCHIATRY & NEUROLOGY

## 2017-07-28 PROCEDURE — 25000125 ZZHC RX 250: Performed by: NURSE ANESTHETIST, CERTIFIED REGISTERED

## 2017-07-28 PROCEDURE — 97150 GROUP THERAPEUTIC PROCEDURES: CPT | Mod: GO

## 2017-07-28 PROCEDURE — 25000128 H RX IP 250 OP 636: Performed by: NURSE ANESTHETIST, CERTIFIED REGISTERED

## 2017-07-28 PROCEDURE — 25000132 ZZH RX MED GY IP 250 OP 250 PS 637: Performed by: PSYCHIATRY & NEUROLOGY

## 2017-07-28 PROCEDURE — 40000671 ZZH STATISTIC ANESTHESIA CASE

## 2017-07-28 PROCEDURE — 71000014 ZZH RECOVERY PHASE 1 LEVEL 2 FIRST HR

## 2017-07-28 PROCEDURE — 40000275 ZZH STATISTIC RCP TIME EA 10 MIN

## 2017-07-28 RX ORDER — ONDANSETRON 2 MG/ML
4 INJECTION INTRAMUSCULAR; INTRAVENOUS EVERY 30 MIN PRN
Status: DISCONTINUED | OUTPATIENT
Start: 2017-07-28 | End: 2017-07-28 | Stop reason: HOSPADM

## 2017-07-28 RX ORDER — LIDOCAINE HYDROCHLORIDE 20 MG/ML
40 INJECTION, SOLUTION EPIDURAL; INFILTRATION; INTRACAUDAL; PERINEURAL
Status: DISCONTINUED | OUTPATIENT
Start: 2017-07-28 | End: 2017-07-28 | Stop reason: HOSPADM

## 2017-07-28 RX ORDER — KETOROLAC TROMETHAMINE 30 MG/ML
30 INJECTION, SOLUTION INTRAMUSCULAR; INTRAVENOUS
Status: COMPLETED | OUTPATIENT
Start: 2017-07-28 | End: 2017-07-28

## 2017-07-28 RX ORDER — ONDANSETRON 2 MG/ML
4 INJECTION INTRAMUSCULAR; INTRAVENOUS
Status: COMPLETED | OUTPATIENT
Start: 2017-07-28 | End: 2017-07-28

## 2017-07-28 RX ORDER — LIDOCAINE HYDROCHLORIDE 20 MG/ML
INJECTION, SOLUTION INFILTRATION; PERINEURAL PRN
Status: DISCONTINUED | OUTPATIENT
Start: 2017-07-28 | End: 2017-07-28

## 2017-07-28 RX ORDER — CITRIC ACID/SODIUM CITRATE 334-500MG
30 SOLUTION, ORAL ORAL
Status: COMPLETED | OUTPATIENT
Start: 2017-07-28 | End: 2017-07-28

## 2017-07-28 RX ORDER — SODIUM CHLORIDE, SODIUM LACTATE, POTASSIUM CHLORIDE, CALCIUM CHLORIDE 600; 310; 30; 20 MG/100ML; MG/100ML; MG/100ML; MG/100ML
INJECTION, SOLUTION INTRAVENOUS CONTINUOUS
Status: DISCONTINUED | OUTPATIENT
Start: 2017-07-28 | End: 2017-07-28 | Stop reason: HOSPADM

## 2017-07-28 RX ORDER — ONDANSETRON 2 MG/ML
4 INJECTION INTRAMUSCULAR; INTRAVENOUS
Status: CANCELLED
Start: 2017-07-28 | End: 2017-07-28

## 2017-07-28 RX ORDER — IPRATROPIUM BROMIDE AND ALBUTEROL SULFATE 2.5; .5 MG/3ML; MG/3ML
3 SOLUTION RESPIRATORY (INHALATION)
Status: COMPLETED | OUTPATIENT
Start: 2017-07-28 | End: 2017-07-28

## 2017-07-28 RX ORDER — SODIUM CHLORIDE, SODIUM LACTATE, POTASSIUM CHLORIDE, CALCIUM CHLORIDE 600; 310; 30; 20 MG/100ML; MG/100ML; MG/100ML; MG/100ML
INJECTION, SOLUTION INTRAVENOUS CONTINUOUS PRN
Status: DISCONTINUED | OUTPATIENT
Start: 2017-07-28 | End: 2017-07-28

## 2017-07-28 RX ORDER — CITRIC ACID/SODIUM CITRATE 334-500MG
30 SOLUTION, ORAL ORAL
Status: CANCELLED
Start: 2017-07-28 | End: 2017-07-28

## 2017-07-28 RX ORDER — IPRATROPIUM BROMIDE AND ALBUTEROL SULFATE 2.5; .5 MG/3ML; MG/3ML
3 SOLUTION RESPIRATORY (INHALATION)
Status: CANCELLED
Start: 2017-07-28 | End: 2017-07-28

## 2017-07-28 RX ORDER — ONDANSETRON 4 MG/1
4 TABLET, ORALLY DISINTEGRATING ORAL EVERY 30 MIN PRN
Status: DISCONTINUED | OUTPATIENT
Start: 2017-07-28 | End: 2017-07-28 | Stop reason: HOSPADM

## 2017-07-28 RX ORDER — ETOMIDATE 2 MG/ML
INJECTION INTRAVENOUS PRN
Status: DISCONTINUED | OUTPATIENT
Start: 2017-07-28 | End: 2017-07-28

## 2017-07-28 RX ORDER — LIDOCAINE HYDROCHLORIDE 20 MG/ML
40 INJECTION, SOLUTION EPIDURAL; INFILTRATION; INTRACAUDAL; PERINEURAL
Status: CANCELLED
Start: 2017-07-28 | End: 2017-07-28

## 2017-07-28 RX ORDER — KETOROLAC TROMETHAMINE 30 MG/ML
30 INJECTION, SOLUTION INTRAMUSCULAR; INTRAVENOUS
Status: CANCELLED
Start: 2017-07-28 | End: 2017-07-28

## 2017-07-28 RX ADMIN — MIDAZOLAM HYDROCHLORIDE 2 MG: 1 INJECTION, SOLUTION INTRAMUSCULAR; INTRAVENOUS at 07:02

## 2017-07-28 RX ADMIN — CLONAZEPAM 0.5 MG: 0.5 TABLET ORAL at 08:00

## 2017-07-28 RX ADMIN — KETOROLAC TROMETHAMINE 30 MG: 30 INJECTION, SOLUTION INTRAMUSCULAR at 06:48

## 2017-07-28 RX ADMIN — RACEPINEPHRINE HYDROCHLORIDE 0.5 ML: 11.25 SOLUTION RESPIRATORY (INHALATION) at 06:23

## 2017-07-28 RX ADMIN — SODIUM CHLORIDE, POTASSIUM CHLORIDE, SODIUM LACTATE AND CALCIUM CHLORIDE: 600; 310; 30; 20 INJECTION, SOLUTION INTRAVENOUS at 06:52

## 2017-07-28 RX ADMIN — Medication 60 MG: at 06:58

## 2017-07-28 RX ADMIN — OLANZAPINE 5 MG: 5 TABLET, ORALLY DISINTEGRATING ORAL at 05:53

## 2017-07-28 RX ADMIN — IPRATROPIUM BROMIDE AND ALBUTEROL SULFATE 3 ML: .5; 3 SOLUTION RESPIRATORY (INHALATION) at 06:19

## 2017-07-28 RX ADMIN — ETOMIDATE 16 MG: 2 INJECTION INTRAVENOUS at 06:58

## 2017-07-28 RX ADMIN — LEVOMILNACIPRAN HYDROCHLORIDE 80 MG: 40 CAPSULE, EXTENDED RELEASE ORAL at 08:00

## 2017-07-28 RX ADMIN — SODIUM CITRATE AND CITRIC ACID MONOHYDRATE 30 ML: 500; 334 SOLUTION ORAL at 06:47

## 2017-07-28 RX ADMIN — Medication 100 MG: at 06:58

## 2017-07-28 RX ADMIN — ONDANSETRON 4 MG: 2 INJECTION INTRAMUSCULAR; INTRAVENOUS at 06:47

## 2017-07-28 RX ADMIN — FLUTICASONE FUROATE AND VILANTEROL TRIFENATATE 1 PUFF: 200; 25 POWDER RESPIRATORY (INHALATION) at 05:57

## 2017-07-28 RX ADMIN — VENLAFAXINE HYDROCHLORIDE 37.5 MG: 37.5 TABLET, EXTENDED RELEASE ORAL at 08:00

## 2017-07-28 RX ADMIN — FLUTICASONE PROPIONATE 2 SPRAY: 50 SPRAY, METERED NASAL at 08:00

## 2017-07-28 RX ADMIN — CHOLECALCIFEROL CAP 125 MCG (5000 UNIT) 5000 UNITS: 125 CAP at 08:00

## 2017-07-28 RX ADMIN — PANTOPRAZOLE SODIUM 40 MG: 40 TABLET, DELAYED RELEASE ORAL at 05:52

## 2017-07-28 RX ADMIN — MIDAZOLAM HYDROCHLORIDE 2 MG: 1 INJECTION, SOLUTION INTRAMUSCULAR; INTRAVENOUS at 07:07

## 2017-07-28 ASSESSMENT — LIFESTYLE VARIABLES: TOBACCO_USE: 1

## 2017-07-28 ASSESSMENT — ACTIVITIES OF DAILY LIVING (ADL)
DRESS: STREET CLOTHES;INDEPENDENT
ORAL_HYGIENE: INDEPENDENT
GROOMING: INDEPENDENT

## 2017-07-28 NOTE — ANESTHESIA POSTPROCEDURE EVALUATION
Patient: Nuha Lees    * No procedures listed *    Diagnosis:Severe recurrent major depression without psychotic features (H) [F33.2]  Diagnosis Additional Information: No value filed.    Anesthesia Type:  General    Note:  Anesthesia Post Evaluation    Patient location during evaluation: PACU and Bedside  Patient participation: Able to fully participate in evaluation  Level of consciousness: awake  Pain management: adequate  Airway patency: patent  Cardiovascular status: acceptable  Respiratory status: acceptable  Hydration status: acceptable  PONV: none     Anesthetic complications: None          Last vitals:  Vitals:    07/28/17 0559 07/28/17 0713 07/28/17 0715   BP: 108/77 124/77 113/78   Pulse: 120     Resp: 16 22 25   Temp: 36.6  C (97.8  F)  36.9  C (98.4  F)   SpO2:  100% 100%         Electronically Signed By: Rachel Willard MD  July 28, 2017  7:41 AM

## 2017-07-28 NOTE — PROCEDURES
Procedure/Surgery Information   [facility], [place]    Bedside Procedure Note  Date of Service (when I performed the procedure): 07/28/2017    Nuha Lees is a 31 year old female patient.  1. Severe recurrent major depression without psychotic features (H)      Past Medical History:   Diagnosis Date     Asthma      Temp: 97.8  F (36.6  C) Temp src: Oral BP: 108/77 Pulse: 120   Resp: 16          Procedures     Heber Lemus     [facility], [place]   ECT Procedure Note  07/28/2017    Nuha Lees [id number]   31 year old 1985     Patient Status: Inpatient    Is this the first in a series of 12 treatments?  No    History and Physical: Reviewed in medical record    Consent: Informed consent was signed by: patient    Date Consent Signed: 7/17/17      Allergies   Allergen Reactions     Codeine Sulfate      Patient reported tolerated Ultram in the past     Sulfa Drugs      Trintellix [Vortioxetine]        Weight:  179 lbs 3.2 oz    Patient Preparations: Glasses/Contacts removed         Indications for ECT:   Medications ineffective and Imminent suicide risk         Clinical Narrative:   Patient was admitted with worsening depression and suicidal ideation.  NPO after 2400.  Alert and Oriented x 3.  Mood is back to baseline.  She finishes ECT today.   Denies problems from that treatment.           Diagnosis:   Major depression (F33.2)         Pause for the Cause:     Right patient Yes   Right procedure/laterality settings: Yes          Intra-Procedure Documentation:       ECT #: 6   Treatment number this series: 6   Total treatment number: 6     Type of ECT:  BILATERAL    ECT Medications:    Zyprexa Zydis:  5mg po before coming to ECT  Duoneb  Racemic Epi Nebulizer  Bicitra: 30cc  Zofran: 4mg  Toradol 30mg for HA  Etomidate: 16mg  Succinyl Choline: 100mg   Versed:  2mg for post ECT agitation + may repeat 2mg     ECT Strip Summary:   Energy Level: 35  percent  Motor Seizure Duration:   32 seconds  EEG Seizure Duration:    46 seconds    Complications: No    Plan:   This is the final ECT.  She will be discharged.  No more ECT will be scheduled.      Heber Lemus MD

## 2017-07-28 NOTE — PROGRESS NOTES
[facility]   Dr. Lemus's Psychiatric Progress Note  2017      Patient:  Nuha Lees   Medical Record Number:  [id number]  :  1985          Interim History:   The patient's care was discussed with the treatment team and chart notes were reviewed.  Mood is very good.  She feels the ECT has been very helpful and is looking forward to going home. She'll have her last ECT today.      Psychiatric ROS:  Mood:  good  Sleep:  good  Appetite:  good  Eating:  normal  Energy Level:  Pretty good  Concentration/Memory: ok, minimal forgetfulness  Suicidal Thoughts:  None  Homicidal Thoughts:No  Psychotic Symptoms: No  Medication Side Effects:  constipation  Medication Compliance:Yes   Physical Complaints:  asthma         Medications:     Current Facility-Administered Medications   Medication     lidocaine (PF) (XYLOCAINE) 2 % injection 40 mg     midazolam (VERSED) injection 1-2 mg     midazolam (VERSED) injection 1-2 mg     midazolam (VERSED) injection 1-2 mg     RacEPINEPHrine neb solution 0.5 mL     OLANZapine zydis (zyPREXA) ODT tab 5 mg     venlafaxine (EFFEXOR-ER) 24 hr tablet 37.5 mg     ibuprofen (ADVIL/MOTRIN) tablet 800 mg     traZODone (DESYREL) tablet 100 mg     albuterol (PROAIR HFA/PROVENTIL HFA/VENTOLIN HFA) Inhaler 2 puff     clonazePAM (klonoPIN) tablet 0.5 mg     fluticasone-vilanterol (BREO ELLIPTA) 200-25 MCG/INH oral inhaler 1 puff     hydrOXYzine (ATARAX) tablet 25-50 mg     ipratropium - albuterol 0.5 mg/2.5 mg/3 mL (DUONEB) neb solution 3 mL     montelukast (SINGULAIR) tablet 10 mg     nicotine (NICODERM CQ) 14 MG/24HR 24 hr patch 1 patch     pantoprazole (PROTONIX) EC tablet 40 mg     cholecalciferol (vitamin D3) capsule CAPS 5,000 Units     acetaminophen (TYLENOL) tablet 650 mg     alum & mag hydroxide-simethicone (MYLANTA ES/MAALOX  ES) suspension 30 mL     magnesium hydroxide (MILK OF MAGNESIA) suspension 30 mL     nicotine patch REMOVAL      nicotine Patch in Place     levomilnacipran (FETZIMA ER) 24 hr capsule 80 mg     fluticasone (FLONASE) 50 MCG/ACT spray 2 spray             Allergies:     Allergies   Allergen Reactions     Codeine Sulfate      Patient reported tolerated Ultram in the past     Sulfa Drugs      Trintellix [Vortioxetine]             Psychiatric Examination:   /71  Pulse 120  Temp 98.2  F (36.8  C) (Oral)  Resp 14  Wt 81.3 kg (179 lb 3.2 oz)  SpO2 97%  BMI 29.86 kg/m2  Weight is 179 lbs 3.2 oz  Body mass index is 29.86 kg/(m^2).    Appearance:  poorly groomed  Attitude:  cooperative  Eye Contact:  good  Mood:  Better;  Less depressed and less anxious  Affect:  mood congruent  Speech:  clear, coherent  Psychomotor Behavior:  no evidence of tardive dyskinesia, dystonia, or tics  Throught Process:  logical  Associations:  no loose associations  Thought Content:  No psychosis;  No SI today    Insight:  fair  Judgement:  intact  Oriented to:  time, person, and place  Attention Span and Concentration:  intact  Recent and Remote Memory:  intact  Gait:Normal    Risk/Potential for Dangerousness:  Multiple Active Diagnoses:HIGH  Self Care:HIGH  Suicide:HIGH  Assault:LOW  Self Injurious Behaviors:LOW  Inappropriate Sexual Behavior:LOW         Labs:        Recent Results (from the past 1008 hour(s))   EKG 12-lead, complete    Collection Time: 07/13/17  4:51 PM   Result Value Ref Range    Interpretation ECG Click View Image link to view waveform and result    Potassium    Collection Time: 07/14/17  7:51 AM   Result Value Ref Range    Potassium 3.7 3.4 - 5.3 mmol/L   Hemoglobin    Collection Time: 07/14/17  7:51 AM   Result Value Ref Range    Hemoglobin 14.1 11.7 - 15.7 g/dL   CBC with platelets    Collection Time: 07/17/17  6:09 AM   Result Value Ref Range    WBC 9.2 4.0 - 11.0 10e9/L    RBC Count 5.31 (H) 3.8 - 5.2 10e12/L    Hemoglobin 15.5 11.7 - 15.7 g/dL    Hematocrit 46.9 35.0 - 47.0 %    MCV 88 78 - 100 fl    MCH 29.2 26.5 - 33.0  pg    MCHC 33.0 31.5 - 36.5 g/dL    RDW 12.4 10.0 - 15.0 %    Platelet Count 229 150 - 450 10e9/L   Basic metabolic panel    Collection Time: 07/17/17  6:09 AM   Result Value Ref Range    Sodium 142 133 - 144 mmol/L    Potassium 3.6 3.4 - 5.3 mmol/L    Chloride 107 94 - 109 mmol/L    Carbon Dioxide 24 20 - 32 mmol/L    Anion Gap 11 3 - 14 mmol/L    Glucose 97 70 - 99 mg/dL    Urea Nitrogen 13 7 - 30 mg/dL    Creatinine 1.01 0.52 - 1.04 mg/dL    GFR Estimate 63 >60 mL/min/1.7m2    GFR Estimate If Black 76 >60 mL/min/1.7m2    Calcium 9.1 8.5 - 10.1 mg/dL         Impression:   This is a 31 year old female admitted with worsening depression and suicidal ideation and inability to care for herself.   Mood has responded to ECT and med changes.  She is ready for discharge today.           DIagnoses:      Axis I.  Major depressive disorder, recurrent.  Panic disorder with agoraphobia.  Generalized anxiety disorder.       Axis II. Deferred.       Axis III.  Asthma.             Plan:     Continue tapering off Effexor XR.    Started and titrating Fetzima.  She's up to 80mg.  She completed cytochrome P450 testing last month.  Continue ECT series.   ECT treatment #6 is today.   This is her final treatment.   Discharge back home with her family today.        Heber Lemus MD

## 2017-07-28 NOTE — DISCHARGE SUMMARY
DATE OF ADMISSION:  07/13/2017.    DATE OF DISCHARGE:  07/28/2017.    IDENTIFICATION:  The patient is a 31-year-old   woman who lives with her  and 3 kids in [place].  She is followed by Dr. Heber Lemus at [facility]. in [place].  She just started seeing him after a recent [facility] acute hospital  psychiatric admission.  She was admitted at this time for worsening depression, inability to function and suicidal thoughts with plans to hang herself, overdose or slit her throat.  She was admitted for a planned course of ECT.      For history of present illness, past psychiatric history, chemical dependency history, past medical history, family history and social history, see Dr. Lemus's dictated psychiatric admission note dated July 13, 2017.    HOSPITAL COURSE:  The patient was admitted on a voluntary basis to [facility].  She was followed by the hospitalist service and medically cleared for ECT.  Her ECT treatments were done in the PAR, although the last one was done in the OR because of her severe asthma.  Her treatments went well.  She had a total of 6 ECT treatments.  The first 3 were right-sided unilateral ultra brief and the last 3 were bilateral.  During the hospital stay she was tapered off Effexor and started on Fetzima.  Fetzima was titrated to 80 mg daily.  She tolerated the medication changes.  Her mood improved throughout the course of her hospital stay.  By discharge, she was feeling good.  Affect was bright.  She said she no longer felt depressed.  She was eating and sleeping well.  Energy level is good.  Concentration was pretty good.  She had minimal forgetfulness.  She denied suicidal or homicidal thoughts or psychotic features.  She had had some constipation from her medication and has ongoing asthma issues.  By discharge, she felt to be medically and psychiatrically stable and  ready for discharge.    DISCHARGE DIAGNOSES:  Axis I:  Major depressive disorder, recurrent.  Panic disorder with agoraphobia.  Generalized anxiety disorder and asthma.    DISCHARGE MEDICATIONS:    1.  Fetzima 80 mg daily.  2.  Vistaril 25 to 50 mg every 4 hours as needed anxiety.  3.  Albuterol inhaler 2 puffs every 4 hours as needed.    4.  Fluticasone/Vilanterol 1 puff daily.    5.  DuoNeb as needed.  6.  Trazodone 50 mg at bedtime as needed.  7.  MiraLAX 17 grams daily as needed.  8.  Senokot 2 tablets twice a day as needed.  9.   Protonix 40 mg daily.    10.  Nicotine patch 14 mg   11.  Fluticasone spray 2 sprays both nostrils daily.    12.  Singular 10 mg at bedtime.  13.  Klonopin 0.5 mg 3 times daily.    14.  Vitamin D.  15.  Cholecalciferol 5000 units daily.    Greater than 30 minutes spent in discharge planning, paperwork,interview and dictation end dictation.        Heber Lemus MD    D:  07/28/2017 09:27 T:  07/28/2017 09:37  Document:  [id number] CV\SK

## 2017-07-28 NOTE — PROGRESS NOTES
Patient discharging 7/28/2017 1115 am accompanied by  and destination is home.    Discharge paperwork, Post ECT care and medications reviewed with pt and  who verbalize understanding.     Copies provided: AVS yes      Med Rec -done Meds-Fetzima-30 day supply-  Security -items returned-   Locker -emptied; Pt left with all personal belongings.    DISCHARGE FLOW SHEET: done    CARE PLAN COMPLETE: done    EDUCATION COMPLETE: done    Illness Management Recovery model: Personal Plan of Care    Patient completed Personal Plan of Care, identifying reasons for hospitalization and goals for discharge. Form reviewed in team meeting  by patient, physician, writer and RN. Form given to [facility] to be scanned into EPIC.    Survey provided.

## 2017-07-28 NOTE — DISCHARGE INSTRUCTIONS
Behavioral Discharge Planning and Instructions    Summary: You were admitted due to increased depression and suicidal ideation. You were admitted for ECT. You are being discharged home.    ECT Discharge Instructions      During your ECT series:      Do not drive or work heavy equipment until 7 days after your last treatment.    Do not drink alcohol or use street drugs (illicit drugs) while you are having treatments.    Do not make important decisions, including legal decisions.    After each treatment:      Get plenty of rest. A responsible adult must stay with your for at least 6 hours.    Avoid heavy or risky activities for 24 hours.    If you feel light-headed, sit for a few minutes before standing. Have someone help you get up.    If you have nausea (feel sick to your stomach): Drink only clear liquids such as apple juice, ginger ale, broth or 7UP, Be sure to drink plenty of liquids. Move to a normal diet as you feel able.     If you received Toradol, wait 6 hours before taking ibuprofen.    Call your doctor if:     You have a fever over 100F (37.7 C) (taken under the tongue), or a fever that last more than 24 hours.    Your IV site is red, swollen, very painful or is getting more tender.    You have nausea that gets very bad or does not improve.      If you have any symptoms after ECT, tell our staff before your next treatment.    The ECT Department can be reached at [phone].  The ECT Department is open Mondays, Wednesdays and Fridays from 7:00 AM to 2:00 PM.      Other: You received Toradol this morning during ECT. You may resume any NSAIDS as needed for pain after 1 pm today.    Transported by:        Main Diagnosis: Major depression    Major Treatments, Procedures and Findings: You met with Dr. Lemus for psychiatric assessment, ECT and medication management. Direct care was provided by unit nurses and staff. You met with case management. You had opportunities to participate in therapeutic groups  on the unit. At this time you report your mood has stabilized and you report you are not having thoughts or intent to harm yourself or others. You will be discharged home.      Symptoms to Report: feeling more aggressive, increased confusion, losing more sleep, mood getting worse or thoughts of suicide    Lifestyle Adjustment: Take medications only as prescribed. Do not use alcohol or illicit drugs. Attend all scheduled appointments with your outpatient providers. Call at least 24 hours in advance if you need to reschedule an appointment to ensure continued access to your outpatient providers. Contact the appropriate professional or hotline listed below as needed for support if your symptoms continue, or call 9-1-1 in an emergency.       Psychiatry Follow-up:   Psychiatry Appointment Date/Time: Thursday 8/10/17 at 2:20 pm   Psychiatrist: Dr. Lemus     Address: [address], [place] [address]     Phone Number: [phone]  Fax: [phone]       Therapy Appointment Date/Time: Wednesday 8/2/2017 at 11 am   Therapist:Castillo Rincon     Address: [address] [place] [address]  Phone: [phone]   E-mail: [email or web address]     Follow up as needed with your primary care provider:  Anabel Lord at [facility], [address] [place] [address]  Phone: [phone]    Resources:   Crisis Intervention: 230.409.1120 or 761-868-9148 (TTY: 541.231.3870).  Call anytime for help.  National [place] on Mental Illness (www.[place].kelly.org): 105.955.2902 or 569-905-9802.  Alcoholics Anonymous (www.alcoholics-anonymous.org): Check your phone book for your local chapter.  Suicide Awareness Voices of Education (SAVE) (www.save.org): 929-039-DLQI (8133)  National Suicide Prevention Line (www.mentalhealthmn.org): 496-099-DMYR (0242)  Mental Health Consumer/Survivor Network of [place] (www.mhcsn.net): 959.328.3842 or 368-257-3363  Mental Health  Association of [place] (www.mentalhealth.org): 639.926.1633 or 535-827-4955    General Medication Instructions:   See your medication sheet(s) for instructions.   Take all medicines as directed.  Make no changes unless your doctor suggests them.   Go to all your doctor visits.  Be sure to have all your required lab tests. This way, your medicines can be refilled on time.  Do not use any drugs not prescribed by your doctor.  Avoid alcohol.    The treatment team has appreciated the opportunity to work with you.  We wish you the best in the future.  You will be receiving a follow-up phone call within the next three days from a representative from behavioral health.  You have identified the best phone number to reach you as [phone].     If you have any questions or concerns our unit number is [phone].

## 2017-09-16 ENCOUNTER — TRANSFERRED RECORDS (OUTPATIENT)
Dept: HEALTH INFORMATION MANAGEMENT | Facility: CLINIC | Age: 32
End: 2017-09-16

## 2017-09-16 ENCOUNTER — HOSPITAL ENCOUNTER (INPATIENT)
Facility: CLINIC | Age: 32
LOS: 2 days | Discharge: ANOTHER HOSPITAL | End: 2017-09-18
Attending: PSYCHIATRY & NEUROLOGY | Admitting: PSYCHIATRY & NEUROLOGY
Payer: COMMERCIAL

## 2017-09-16 PROBLEM — R45.851 SUICIDAL IDEATION: Status: ACTIVE | Noted: 2017-09-16

## 2017-09-16 PROCEDURE — 25000132 ZZH RX MED GY IP 250 OP 250 PS 637: Performed by: PSYCHIATRY & NEUROLOGY

## 2017-09-16 PROCEDURE — 12400007 ZZH R&B MH INTERMEDIATE UMMC

## 2017-09-16 RX ORDER — CLONAZEPAM 0.5 MG/1
0.5 TABLET ORAL 3 TIMES DAILY
Status: DISCONTINUED | OUTPATIENT
Start: 2017-09-17 | End: 2017-09-17

## 2017-09-16 RX ORDER — BISACODYL 10 MG
10 SUPPOSITORY, RECTAL RECTAL DAILY PRN
Status: DISCONTINUED | OUTPATIENT
Start: 2017-09-16 | End: 2017-09-18 | Stop reason: HOSPADM

## 2017-09-16 RX ORDER — MONTELUKAST SODIUM 10 MG/1
10 TABLET ORAL AT BEDTIME
Status: DISCONTINUED | OUTPATIENT
Start: 2017-09-16 | End: 2017-09-18 | Stop reason: HOSPADM

## 2017-09-16 RX ORDER — OLANZAPINE 5 MG/1
5 TABLET ORAL
Status: DISCONTINUED | OUTPATIENT
Start: 2017-09-16 | End: 2017-09-18 | Stop reason: HOSPADM

## 2017-09-16 RX ORDER — FLUTICASONE PROPIONATE 50 MCG
2 SPRAY, SUSPENSION (ML) NASAL DAILY
Status: DISCONTINUED | OUTPATIENT
Start: 2017-09-17 | End: 2017-09-18 | Stop reason: HOSPADM

## 2017-09-16 RX ORDER — ACETAMINOPHEN 325 MG/1
650 TABLET ORAL EVERY 4 HOURS PRN
Status: DISCONTINUED | OUTPATIENT
Start: 2017-09-16 | End: 2017-09-18 | Stop reason: HOSPADM

## 2017-09-16 RX ORDER — TRAZODONE HYDROCHLORIDE 50 MG/1
50-100 TABLET, FILM COATED ORAL
Status: DISCONTINUED | OUTPATIENT
Start: 2017-09-16 | End: 2017-09-18 | Stop reason: HOSPADM

## 2017-09-16 RX ORDER — OLANZAPINE 10 MG/2ML
5 INJECTION, POWDER, FOR SOLUTION INTRAMUSCULAR
Status: DISCONTINUED | OUTPATIENT
Start: 2017-09-16 | End: 2017-09-18 | Stop reason: HOSPADM

## 2017-09-16 RX ORDER — ALUMINA, MAGNESIA, AND SIMETHICONE 2400; 2400; 240 MG/30ML; MG/30ML; MG/30ML
30 SUSPENSION ORAL EVERY 4 HOURS PRN
Status: DISCONTINUED | OUTPATIENT
Start: 2017-09-16 | End: 2017-09-18 | Stop reason: HOSPADM

## 2017-09-16 RX ORDER — ALBUTEROL SULFATE 90 UG/1
2 AEROSOL, METERED RESPIRATORY (INHALATION) EVERY 4 HOURS PRN
Status: DISCONTINUED | OUTPATIENT
Start: 2017-09-16 | End: 2017-09-18 | Stop reason: HOSPADM

## 2017-09-16 RX ORDER — HYDROXYZINE HYDROCHLORIDE 25 MG/1
25-50 TABLET, FILM COATED ORAL EVERY 4 HOURS PRN
Status: DISCONTINUED | OUTPATIENT
Start: 2017-09-16 | End: 2017-09-18 | Stop reason: HOSPADM

## 2017-09-16 RX ADMIN — TRAZODONE HYDROCHLORIDE 100 MG: 50 TABLET ORAL at 23:30

## 2017-09-16 RX ADMIN — MONTELUKAST SODIUM 10 MG: 10 TABLET, FILM COATED ORAL at 23:30

## 2017-09-16 NOTE — IP AVS SNAPSHOT
MRN:[id number]                      After Visit Summary   9/16/2017    Nuha Lees    MRN: [id number]           Thank you!     Thank you for choosing [facility] for your care. Our goal is always to provide you with excellent care.        Patient Information     Date Of Birth          1985        About your hospital stay     You were admitted on:  September 16, 2017 You last received care in the:  [facility]    You were discharged on:  September 18, 2017       Who to Call     For medical emergencies, please call 911.  For non-urgent questions about your medical care, please call your primary care provider or clinic, [phone]          Attending Provider     Provider Brad Sandy MD Psychiatry       Primary Care Provider Office Phone # Fax #    Anabel Lord MD, [phone]      Further instructions from your care team        Behavioral Discharge Planning and Instructions      Summary:  You were admitted on 9/16/2017  due to Depression and Suicidal Ideations.  You were treated by Dr. Brad Burrell MD and discharged on 9/18/17 from [facility] to [place] [address] for ECT treatments.      Principal Diagnosis:   1.  Major depressive disorder, severe, recurrent with psychotic features.   2.  Panic disorder with agoraphobia.   3.  Generalized anxiety disorder.   4.  Asthma.     Health Care Follow-up Appointments:     [place] [address] [phone] for ECT treatments.     Psychiatry: Dr. Lemus at [facility] Appointment set in November [address] [facility] [address], [place] [address]  Phone number: [phone]  Fax: [phone]   Therapy: Zuleyma Rincon, [address] [place] [address], [address], [place], [place] [address], [phone] (weekly appointments, Wednesday's at 11:00)  PCP: Martha Denise NP at [facility], [address] [place] [address], [phone]    Attend all scheduled appointments with  your outpatient providers. Call at least 24 hours in advance if you need to reschedule an appointment to ensure continued access to your outpatient providers.   Major Treatments, Procedures and Findings:  You were provided with: a psychiatric assessment, assessed for medical stability, medication evaluation and/or management and group therapy    Symptoms to Report: feeling more aggressive, increased confusion, losing more sleep, mood getting worse or thoughts of suicide    Early warning signs can include: increased depression or anxiety sleep disturbances increased thoughts or behaviors of suicide or self-harm  increased unusual thinking, such as paranoia or hearing voices    Safety and Wellness:  Take all medicines as directed.  Make no changes unless your doctor suggests them.      Follow treatment recommendations.  Refrain from alcohol and non-prescribed drugs.  If there is a concern for safety, call 611.    Resources:   Crisis Intervention: 349.289.8137 or 447-626-1766 (TTY: 485.507.5967).  Call anytime for help.  National [place] on Mental Illness (www.[place].kelly.org): 812.131.5052 or 980-529-5078.  Suicide Awareness Voices of Education (SAVE) (www.save.org): 209-342-TAKK (1683)  National Suicide Prevention Line (www.mentalhealthmn.org): 626-086-VWDM (5282)  Mental Health Consumer/Survivor Network of [place] (www.mhcsn.net): 487.542.8957 or 199-353-3392  Mental Health Association of [place] (www.mentalhealth.org): 476.466.5124 or 983-779-8364    The treatment team has appreciated the opportunity to work with you.     If you have any questions or concerns our unit number is [phone].        Pending Results     No orders found from 9/14/2017 to 9/17/2017.            Admission Information     Date & Time Provider Department Dept. Phone    9/16/2017 Brad Burrell MD [facility] [phone]      Your Vitals Were     Blood Pressure Pulse Temperature Respirations Height Weight    120/51 84 97.8  F (36.6  C) 16 1.626 m  "(5' 4\") 80.7 kg (178 lb)    BMI (Body Mass Index)                   30.55 kg/m2           [facility] Information     [facility] lets you send messages to your doctor, view your test results, renew your prescriptions, schedule appointments and more. To sign up, go to [email or web address] . Click on \"Log in\" on the left side of the screen, which will take you to the Welcome page. Then click on \"Sign up Now\" on the right side of the page.     You will be asked to enter the access code listed below, as well as some personal information. Please follow the directions to create your username and password.     Your access code is: [email or web address]  Expires: 2017  5:51 PM     Your access code will  in 90 days. If you need help or a new code, please call your [facility] clinic or [phone].        Care EveryWhere ID     This is your Care EveryWhere ID. This could be used by other organizations to access your [facility] medical records  [id number]        Equal Access to Services     [facility]: Hadii maddie rosales Solakshmi, waaxda luqadaha, qaybta kaalmamadelin vincent, azeb marrufo . So [facility] [phone].    ATENCIÓN: Si habla español, tiene a gray disposición servicios gratuitos de asistencia lingüística. Everette al [phone].    We comply with applicable federal civil rights laws and [place] laws. We do not discriminate on the basis of race, color, national origin, age, disability sex, sexual orientation or gender identity.               Review of your medicines      UNREVIEWED medicines. Ask your doctor about these medicines        Dose / Directions    albuterol 108 (90 BASE) MCG/ACT Inhaler   Commonly known as:  PROAIR HFA/PROVENTIL HFA/VENTOLIN HFA        Dose:  2 puff   Inhale 2 puffs into the lungs every 4 hours as needed for shortness of breath / dyspnea or wheezing   Refills:  0       CLONAZEPAM PO        Dose:  0.5 mg   Take 0.5 mg by mouth 3 times daily   Refills:  0       " fluticasone 27.5 MCG/SPRAY spray   Commonly known as:  VERAMYST        Dose:  2 spray   Spray 2 sprays into both nostrils daily   Refills:  0       fluticasone-vilanterol 200-25 MCG/INH oral inhaler   Commonly known as:  BREO ELLIPTA   Used for:  Asthma exacerbation        Dose:  1 puff   Inhale 1 puff into the lungs daily   Quantity:  1 Inhaler   Refills:  0       hydrOXYzine 25 MG tablet   Commonly known as:  ATARAX   Used for:  Anxiety        Dose:  25-50 mg   Take 1-2 tablets (25-50 mg) by mouth every 4 hours as needed for anxiety   Quantity:  30 tablet   Refills:  0       ipratropium - albuterol 0.5 mg/2.5 mg/3 mL 0.5-2.5 (3) MG/3ML neb solution   Commonly known as:  DUONEB        Dose:  3 mL   Take 1 vial (3 mLs) by nebulization every 4 hours as needed for shortness of breath / dyspnea or wheezing   Quantity:  360 mL   Refills:  0       levomilnacipran 80 MG 24 hr capsule   Commonly known as:  FETZIMA ER   Used for:  Severe recurrent major depression without psychotic features (H)        Dose:  80 mg   Take 1 capsule (80 mg) by mouth daily   Quantity:  30 capsule   Refills:  0       montelukast 10 MG tablet   Commonly known as:  SINGULAIR        Dose:  10 mg   Take 10 mg by mouth At Bedtime   Refills:  0       nicotine 14 MG/24HR 24 hr patch   Commonly known as:  NICODERM CQ   Used for:  Asthma exacerbation        Dose:  1 patch   Place 1 patch onto the skin daily   Quantity:  14 patch   Refills:  0       pantoprazole 40 MG EC tablet   Commonly known as:  PROTONIX   Used for:  Gastroesophageal reflux disease without esophagitis        Dose:  40 mg   Take 1 tablet (40 mg) by mouth every morning (before breakfast)   Quantity:  15 tablet   Refills:  0       polyethylene glycol Packet   Commonly known as:  MIRALAX/GLYCOLAX   Used for:  Slow transit constipation        Dose:  17 g   Take 17 g by mouth daily as needed for constipation   Quantity:  7 packet   Refills:  0       senna-docusate 8.6-50 MG per tablet    Commonly known as:  SENOKOT-S;PERICOLACE   Used for:  Slow transit constipation        Dose:  2 tablet   Take 2 tablets by mouth 2 times daily as needed (constipation )   Quantity:  20 tablet   Refills:  0       traZODone 50 MG tablet   Commonly known as:  DESYREL   Used for:  Severe recurrent major depression without psychotic features (H)        Dose:  50 mg   Take 1 tablet (50 mg) by mouth nightly as needed for sleep   Quantity:  15 tablet   Refills:  0       VITAMIN D (CHOLECALCIFEROL) PO        Dose:  5000 Units   Take 5,000 Units by mouth daily   Refills:  0                Protect others around you: Learn how to safely use, store and throw away your medicines at www.disposemymeds.org.             Medication List: This is a list of all your medications and when to take them. Check marks below indicate your daily home schedule. Keep this list as a reference.      Medications           Morning Afternoon Evening Bedtime As Needed    albuterol 108 (90 BASE) MCG/ACT Inhaler   Commonly known as:  PROAIR HFA/PROVENTIL HFA/VENTOLIN HFA   Inhale 2 puffs into the lungs every 4 hours as needed for shortness of breath / dyspnea or wheezing                                CLONAZEPAM PO   Take 0.5 mg by mouth 3 times daily   Last time this was given:  0.5 mg on 9/18/2017  1:21 PM                                fluticasone 27.5 MCG/SPRAY spray   Commonly known as:  VERAMYST   Spray 2 sprays into both nostrils daily                                fluticasone-vilanterol 200-25 MCG/INH oral inhaler   Commonly known as:  BREO ELLIPTA   Inhale 1 puff into the lungs daily   Last time this was given:  1 puff on 9/17/2017 11:24 AM                                hydrOXYzine 25 MG tablet   Commonly known as:  ATARAX   Take 1-2 tablets (25-50 mg) by mouth every 4 hours as needed for anxiety   Last time this was given:  50 mg on 9/18/2017  4:34 PM                                ipratropium - albuterol 0.5 mg/2.5 mg/3 mL 0.5-2.5 (3)  MG/3ML neb solution   Commonly known as:  DUONEB   Take 1 vial (3 mLs) by nebulization every 4 hours as needed for shortness of breath / dyspnea or wheezing                                levomilnacipran 80 MG 24 hr capsule   Commonly known as:  FETZIMA ER   Take 1 capsule (80 mg) by mouth daily                                montelukast 10 MG tablet   Commonly known as:  SINGULAIR   Take 10 mg by mouth At Bedtime   Last time this was given:  10 mg on 9/17/2017  7:35 PM                                nicotine 14 MG/24HR 24 hr patch   Commonly known as:  NICODERM CQ   Place 1 patch onto the skin daily                                pantoprazole 40 MG EC tablet   Commonly known as:  PROTONIX   Take 1 tablet (40 mg) by mouth every morning (before breakfast)                                polyethylene glycol Packet   Commonly known as:  MIRALAX/GLYCOLAX   Take 17 g by mouth daily as needed for constipation                                senna-docusate 8.6-50 MG per tablet   Commonly known as:  SENOKOT-S;PERICOLACE   Take 2 tablets by mouth 2 times daily as needed (constipation )                                traZODone 50 MG tablet   Commonly known as:  DESYREL   Take 1 tablet (50 mg) by mouth nightly as needed for sleep   Last time this was given:  100 mg on 9/17/2017  7:35 PM                                VITAMIN D (CHOLECALCIFEROL) PO   Take 5,000 Units by mouth daily

## 2017-09-16 NOTE — IP AVS SNAPSHOT
[address] [address]    Phone:  [phone]                                       After Visit Summary   9/16/2017    Nuha Lees    MRN: [id number]           After Visit Summary Signature Page     I have received my discharge instructions, and my questions have been answered. I have discussed any challenges I see with this plan with the nurse or doctor.    ..........................................................................................................................................  Patient/Patient Representative Signature      ..........................................................................................................................................  Patient Representative Print Name and Relationship to Patient    ..................................................               ................................................  Date                                            Time    ..........................................................................................................................................  Reviewed by Signature/Title    ...................................................              ..............................................  Date                                                            Time

## 2017-09-17 LAB
ALBUMIN SERPL-MCNC: 4 G/DL (ref 3.4–5)
ALP SERPL-CCNC: 54 U/L (ref 40–150)
ALT SERPL W P-5'-P-CCNC: 17 U/L (ref 0–50)
ANION GAP SERPL CALCULATED.3IONS-SCNC: 10 MMOL/L (ref 3–14)
AST SERPL W P-5'-P-CCNC: 11 U/L (ref 0–45)
BASOPHILS # BLD AUTO: 0 10E9/L (ref 0–0.2)
BASOPHILS NFR BLD AUTO: 0.4 %
BILIRUB SERPL-MCNC: 0.8 MG/DL (ref 0.2–1.3)
BUN SERPL-MCNC: 9 MG/DL (ref 7–30)
CALCIUM SERPL-MCNC: 8.8 MG/DL (ref 8.5–10.1)
CHLORIDE SERPL-SCNC: 106 MMOL/L (ref 94–109)
CHOLEST SERPL-MCNC: 219 MG/DL
CO2 SERPL-SCNC: 23 MMOL/L (ref 20–32)
CREAT SERPL-MCNC: 0.76 MG/DL (ref 0.52–1.04)
DIFFERENTIAL METHOD BLD: NORMAL
EOSINOPHIL # BLD AUTO: 0.3 10E9/L (ref 0–0.7)
EOSINOPHIL NFR BLD AUTO: 3.8 %
ERYTHROCYTE [DISTWIDTH] IN BLOOD BY AUTOMATED COUNT: 13 % (ref 10–15)
GFR SERPL CREATININE-BSD FRML MDRD: 88 ML/MIN/1.7M2
GLUCOSE SERPL-MCNC: 80 MG/DL (ref 70–99)
HCT VFR BLD AUTO: 39.6 % (ref 35–47)
HDLC SERPL-MCNC: 50 MG/DL
HGB BLD-MCNC: 13.3 G/DL (ref 11.7–15.7)
IMM GRANULOCYTES # BLD: 0 10E9/L (ref 0–0.4)
IMM GRANULOCYTES NFR BLD: 0.2 %
LDLC SERPL CALC-MCNC: 142 MG/DL
LYMPHOCYTES # BLD AUTO: 2.4 10E9/L (ref 0.8–5.3)
LYMPHOCYTES NFR BLD AUTO: 29.9 %
MCH RBC QN AUTO: 29.6 PG (ref 26.5–33)
MCHC RBC AUTO-ENTMCNC: 33.6 G/DL (ref 31.5–36.5)
MCV RBC AUTO: 88 FL (ref 78–100)
MONOCYTES # BLD AUTO: 0.6 10E9/L (ref 0–1.3)
MONOCYTES NFR BLD AUTO: 7.1 %
NEUTROPHILS # BLD AUTO: 4.8 10E9/L (ref 1.6–8.3)
NEUTROPHILS NFR BLD AUTO: 58.6 %
NONHDLC SERPL-MCNC: 169 MG/DL
NRBC # BLD AUTO: 0 10*3/UL
NRBC BLD AUTO-RTO: 0 /100
PLATELET # BLD AUTO: 227 10E9/L (ref 150–450)
POTASSIUM SERPL-SCNC: 3.8 MMOL/L (ref 3.4–5.3)
PROT SERPL-MCNC: 7.3 G/DL (ref 6.8–8.8)
RBC # BLD AUTO: 4.5 10E12/L (ref 3.8–5.2)
SODIUM SERPL-SCNC: 139 MMOL/L (ref 133–144)
TRIGL SERPL-MCNC: 134 MG/DL
TSH SERPL DL<=0.005 MIU/L-ACNC: 1.42 MU/L (ref 0.4–4)
WBC # BLD AUTO: 8.1 10E9/L (ref 4–11)

## 2017-09-17 PROCEDURE — 25000132 ZZH RX MED GY IP 250 OP 250 PS 637: Performed by: PSYCHIATRY & NEUROLOGY

## 2017-09-17 PROCEDURE — 12400007 ZZH R&B MH INTERMEDIATE UMMC

## 2017-09-17 PROCEDURE — 99223 1ST HOSP IP/OBS HIGH 75: CPT | Mod: AI | Performed by: NURSE PRACTITIONER

## 2017-09-17 PROCEDURE — 99221 1ST HOSP IP/OBS SF/LOW 40: CPT | Performed by: PHYSICIAN ASSISTANT

## 2017-09-17 PROCEDURE — 84443 ASSAY THYROID STIM HORMONE: CPT | Performed by: NURSE PRACTITIONER

## 2017-09-17 PROCEDURE — 36415 COLL VENOUS BLD VENIPUNCTURE: CPT | Performed by: PSYCHIATRY & NEUROLOGY

## 2017-09-17 PROCEDURE — 36415 COLL VENOUS BLD VENIPUNCTURE: CPT | Performed by: NURSE PRACTITIONER

## 2017-09-17 PROCEDURE — 25000125 ZZHC RX 250: Performed by: PSYCHIATRY & NEUROLOGY

## 2017-09-17 PROCEDURE — 25000132 ZZH RX MED GY IP 250 OP 250 PS 637: Performed by: PHYSICIAN ASSISTANT

## 2017-09-17 PROCEDURE — 25000132 ZZH RX MED GY IP 250 OP 250 PS 637: Performed by: NURSE PRACTITIONER

## 2017-09-17 PROCEDURE — 80053 COMPREHEN METABOLIC PANEL: CPT | Performed by: NURSE PRACTITIONER

## 2017-09-17 PROCEDURE — 93005 ELECTROCARDIOGRAM TRACING: CPT

## 2017-09-17 PROCEDURE — 85025 COMPLETE CBC W/AUTO DIFF WBC: CPT | Performed by: PSYCHIATRY & NEUROLOGY

## 2017-09-17 PROCEDURE — 99207 ZZC CONSULT E&M CHANGED TO INITIAL LEVEL: CPT | Performed by: PHYSICIAN ASSISTANT

## 2017-09-17 PROCEDURE — 80061 LIPID PANEL: CPT | Performed by: NURSE PRACTITIONER

## 2017-09-17 RX ORDER — CLONAZEPAM 0.5 MG/1
0.5 TABLET ORAL
Status: DISCONTINUED | OUTPATIENT
Start: 2017-09-18 | End: 2017-09-18 | Stop reason: HOSPADM

## 2017-09-17 RX ORDER — IBUPROFEN 600 MG/1
600 TABLET, FILM COATED ORAL EVERY 6 HOURS PRN
Status: DISCONTINUED | OUTPATIENT
Start: 2017-09-17 | End: 2017-09-18 | Stop reason: HOSPADM

## 2017-09-17 RX ORDER — IPRATROPIUM BROMIDE AND ALBUTEROL SULFATE 2.5; .5 MG/3ML; MG/3ML
3 SOLUTION RESPIRATORY (INHALATION)
Status: DISCONTINUED | OUTPATIENT
Start: 2017-09-18 | End: 2017-09-18 | Stop reason: HOSPADM

## 2017-09-17 RX ORDER — PANTOPRAZOLE SODIUM 40 MG/1
40 TABLET, DELAYED RELEASE ORAL EVERY MORNING
Status: DISCONTINUED | OUTPATIENT
Start: 2017-09-18 | End: 2017-09-18 | Stop reason: HOSPADM

## 2017-09-17 RX ORDER — CLONAZEPAM 0.5 MG/1
0.5 TABLET ORAL
Status: DISCONTINUED | OUTPATIENT
Start: 2017-09-17 | End: 2017-09-18 | Stop reason: HOSPADM

## 2017-09-17 RX ADMIN — TRAZODONE HYDROCHLORIDE 100 MG: 50 TABLET ORAL at 19:35

## 2017-09-17 RX ADMIN — MONTELUKAST SODIUM 10 MG: 10 TABLET, FILM COATED ORAL at 19:35

## 2017-09-17 RX ADMIN — CHOLECALCIFEROL CAP 125 MCG (5000 UNIT) 5000 UNITS: 125 CAP at 09:49

## 2017-09-17 RX ADMIN — CLONAZEPAM 0.5 MG: 0.5 TABLET ORAL at 09:49

## 2017-09-17 RX ADMIN — CLONAZEPAM 0.5 MG: 0.5 TABLET ORAL at 14:58

## 2017-09-17 RX ADMIN — FLUTICASONE FUROATE AND VILANTEROL TRIFENATATE 1 PUFF: 200; 25 POWDER RESPIRATORY (INHALATION) at 11:24

## 2017-09-17 RX ADMIN — HYDROXYZINE HYDROCHLORIDE 50 MG: 25 TABLET ORAL at 17:25

## 2017-09-17 RX ADMIN — FLUTICASONE PROPIONATE 2 SPRAY: 50 SPRAY, METERED NASAL at 09:49

## 2017-09-17 ASSESSMENT — ACTIVITIES OF DAILY LIVING (ADL)
ORAL_HYGIENE: INDEPENDENT
DRESS: SCRUBS (BEHAVIORAL HEALTH)
LAUNDRY: UNABLE TO COMPLETE
HYGIENE/GROOMING: INDEPENDENT

## 2017-09-17 NOTE — PLAN OF CARE
"Problem: Depressive Symptoms  Goal: Depressive Symptoms  Signs and symptoms of listed problems will be absent or manageable.   Outcome: No Change  Admission Assessment    /51  Pulse 84  Temp 98.3  F (36.8  C) (Oral)  Resp 17  Ht 1.626 m (5' 4\")  Wt 80.7 kg (178 lb)  BMI 30.55 kg/m2     Pt is voluntary  No elopement indicators present     Pt admitted to station [facility] at 2250 via EMS from [facility]. Per ED report, pt has history of MDD, anxiety and previous suicide attempts. Patient's most recent stay was on station 32, where she received ECT treatments as recent as July of 2017. She reports increasing depression with plan to overdose on her medications and hang herself. It was not reported that pt actively attempted to OD on medications. UTOX was positive for THC, and pregnancy test was negative. In ED, pt given scheduled dose of klonopin 0.5 mg, as well as Tylenol and Ibuprofen for headache that resulted from her crying.    Pt was visibly upset, and tearful when on the unit. She was agreeable to search of belongings and person. EHR and Consent for Treatment forms signed, and in pt file. Writer went over medication reconciliation with pt, and she was most concerned with her scheduled Trazodone for sleep. Pt was tired, so writer stated that the admission process could be completed in the morning. Pt feels safe on unit, and was provided with a tour and offered toiletries and food.     No additional concerns at this time. Will continue to monitor and assess. At the time of this note, pt has taken prn Trazodone and now resting comfortably in her room.      "
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
## 2017-09-17 NOTE — PROGRESS NOTES
09/16/17 3508   Patient Belongings   Did you bring any home meds/supplements to the hospital?  No   Patient Belongings clothing   Disposition of Belongings locker and sent to [facility]   Belongings Search Yes   Clothing Search Yes   Second Staff Mar     In locker: bra, underwear, sweatshirt, pants,socks, chapstick insurance card and papperwork.    Security: ID  ADMISSION:  I am responsible for any personal items that are not sent to the safe or pharmacy. [facility] is not responsible for loss, theft or damage of any property in my possession.    Patient Signature _____________________ Date/Time _____________________    Staff Signature _______________________ Date/Time _____________________    2nd Staff person, if patient is unable/unwilling to sign  ___________________________________ Date/Time _____________________  DISCHARGE:  All personal items have been returned to me.    Patient Signature _____________________ Date/Time _____________________    Staff Signature _______________________ Date/Time _____________________

## 2017-09-17 NOTE — PLAN OF CARE
Problem: General Plan of Care (Inpatient Behavioral)  Goal: Team Discussion  Team Plan:   BEHAVIORAL TEAM DISCUSSION     Participants: Karuna LIU, Jasmin Lane [facility]  Progress: Initial evlauation  Continued Stay Criteria/Rationale: New patient admitted for depression and SI  Medical/Physical: See medical consult  Precautions:   Behavioral Orders   Procedures     Code 1 - Restrict to Unit     Routine Programming       As clinically indicated     Status 15       Every 15 minutes.     Suicide precautions     Plan: Psychiatric Assessment. Medication Management. Therapeutic Mileu. Individual and group support.  Rationale for change in precautions or plan: Initial plan

## 2017-09-17 NOTE — PROGRESS NOTES
09/17/17 1519   Behavioral Health   Hallucinations denies / not responding to hallucinations   Thinking intact   Orientation place: oriented;date: oriented;time: oriented;person: oriented   Memory baseline memory   Insight poor   Judgement impaired   Eye Contact at examiner   Affect blunted, flat   Mood mood is calm   Physical Appearance/Attire attire appropriate to age and situation   Hygiene neglected grooming - unclean body, hair, teeth   Suicidality thoughts only   Self Injury thoughts only   Activities of Daily Living   Hygiene/Grooming independent   Oral Hygiene independent   Dress scrubs (behavioral health)   Laundry unable to complete   Room Organization independent     Pt. Seemed to be calm and isolative. PT. Spent most of the time in her room. PT.told staff that she feels suicidal and feels like hurting herself.

## 2017-09-17 NOTE — CONSULTS
[facility]  Internal Medicine Consult    Nuha Lees MRN# [id number]   Age: 32 year old YOB: 1985     Date of Admission: 9/16/2017  Date of Consult:  9/17/2017    Requesting Service: Behavioral Health - Brad Burrell MD  Reason for Consult: Pre ECT Medicine Evaluation; Asthma   Indication for ECT: Severe depression    Chief Complaint: Severe depression  HPI: Ms. Nuha Lees is a 33 yo female with hx of severe MDD admitted to [address] for SI. Please refer to psychiatry H&P dated 9/17 by Mindi Aguero NP, for further details. An internal medicine was ordered by Psychiatry to assess medical problems including asthma, and to evaluate patient for having any medical contraindications to having ECT on this admission. At this time, Mr. Lees denies acute physical concerns including fever, chills, chest pain, SOB, abd pain, nausea, bowel and bladder concerns.     Review of Systems:   Cardiovascular: negative  Pulmonary: No shortness of breath, dyspnea on exertion, cough, or hemoptysis  Neurological: negative    Past Medical History:   Prior Anesthesia: Yes  If yes, any complications: Yes; pt had an asthma attack after an ECT session last May and aspirated, resulting in transfer to medicine. She was not intubated. Anesthesia consulted prior to ECT in July and pt needs to have a Duoneb inhalation prior to each future ECT and strict NPO (after 11 pm) day prior to ECT. Only have sips of water prior to taking morning meds.     Prior ECT: Yes x 2, last this past July, 2017  Response: Improvement in depression  Side Effects: HAs; memory loss    Cardiovascular: CAD No, MI No, HTN No, CHF No, pacemaker or ICD No  Pulmonary: Asthma/COPD Yes, Prior respiratory failure or need for emergent intubation No, On theophylline No (note that theophylline use is a contraindication to proceeding with ECT, needs to be tapered off prior)  Neurological: Brain tumor No, TIA/CVA No,  "Dementia No, Neuromuscular Disease (including post polio syndrome) No, Seizures and/or Epilepsy No, Head Injury No, Intracranial Hemorrhage No    Past Surgical History:   Past Surgical History:   Procedure Laterality Date     KNEE SURGERY Right     Roller derby injury      REPAIR PTOSIS  10/5/2011    Procedure:REPAIR PTOSIS; Left Upper Lid Ptosis Repair; Surgeon:MONICA ANDREWS; Location: [facility]     TUBAL LIGATION  2016       Allergies:      Allergies   Allergen Reactions     Codeine Sulfate      Patient reported tolerated Ultram in the past     Sulfa Drugs      Trintellix [Vortioxetine]        Medication list reviewed.    Physical Exam:  /51  Pulse 84  Temp 97.8  F (36.6  C) (Oral)  Resp 16  Ht 1.626 m (5' 4\")  Wt 80.7 kg (178 lb)  BMI 30.55 kg/m2   Gen: In NAD  HEENT: Negative  Neck: No cervical lymphadenopathy  Cardiovascular: RRR. S1, S2. No murmurs, rubs, gallops.   Pulmonary: Effort normal. Lungs CTAB with no wheezing, rales, rhonchi.   Abdomen: SNTND  Skin: Superficial self-inflicted lacerations on L upper lateral leg without signs of infection  Neurological: A&Ox3. No focal deficits. PERRLA.     Data:  EKG: Unremarkable  Labs:   CBC:  Recent Labs   Lab Test  09/17/17   0722   WBC  8.1   RBC  4.50   HGB  13.3   HCT  39.6   MCV  88   MCH  29.6   MCHC  33.6   RDW  13.0   PLT  227     CMP:  Recent Labs   Lab Test  09/17/17   1134   NA  139   POTASSIUM  3.8   CHLORIDE  106   NICHOLAS  8.8   CO2  23   BUN  9   CR  0.76   GLC  80   AST  11   ALT  17   BILITOTAL  0.8   ALBUMIN  4.0   PROTTOTAL  7.3   ALKPHOS  54     HCG:   Negative    Recommendations:   Since pt had an asthma attack after an ECT session last May and aspirated, continue with Anesthesia recommendations from a consult this past July including that pt needs to have a Duoneb inhalation prior to each future ECT and strict NPO (after 11 pm) day prior to ECT. Only have sips of water prior to taking morning meds.     Patient does not have absolute " medical contraindications to proceeding with ECT at this time. Treatment plan per psychiatry.       Caesar Cee PA-C  Internal Medicine Hospitalist   [facility]  [phone]

## 2017-09-17 NOTE — H&P
"DATE OF ADMISSION:  2017      IDENTIFYING INFORMATION:  Ms. Nuha Lees is a 32-year-old female admitted to the [facility], [place], [address].  She was admitted as a voluntary patient through [facility] in [place] on 2017 due to anxiety, depressive symptoms and suicidal ideation.      CHIEF COMPLAINT:  \"I was good for about 3 weeks and then started having up and down days and days when I didn't want to get out of bed and then I couldn't get out of bed anymore.\"      HISTORY OF PRESENT ILLNESS:  Ms. Lees provides information for this assessment.  She is a mostly reliable historian.  Intake data and records from previous hospitalizations were reviewed.      Ms. Lees has historical diagnoses of major depressive disorder, panic disorder with agoraphobia and generalized anxiety disorder.  She was most recently hospitalized at [facility] from 2017 through 2017.  During that hospitalization GeneSight testing was reviewed.  She was tapered off Effexor and Fetzima was initiated.  She underwent a course of 6 ECT treatments, the last one completed in the operating room because of her severe asthma.  She discharged to home.  No medication changes were made.  She was medication compliant following discharge.  She switched her Fetzima to bedtime dosing because she was experiencing a headache throughout the day when she took it in the daytime.  The patient reports that she has had some stressors.  Her father has CHF and diabetes.  She was on a leave of absence from work and on short-term disability; however, after this short-term disability  she was approved for SSDI.  She says that she, her , and her two children all have birthdays in mid August through mid September.  She has had some difficulty coping with this, especially as her depressive symptoms were worsening and she was spending more " "and more time in bed.  Yesterday was her 's 39th birthday party.  She was unable to get out of bed to participate.  She drove herself to [facility] in [place], which is the furthest that she feels comfortable driving, and she was referred for psychiatric hospitalization.      Her mood is depressed.  She has suicidal thoughts with a plan to overdose on all of her and her 's medications, drink wine and then hang herself from the bathroom basement door so that her children do not find her.  She says she has written letters and had selected 10/01/2017 after date she would follow through with this.  She has had suicidal thoughts since early August.  She has low interest, anhedonia, hypersomnia, low energy and low motivation.  Her concentration is \"horrible.\"  She has some memory impairment for memories over the last year.  She has feelings of hopelessness, helplessness, worthlessness and guilt.  She has been crying frequently.  She has had a low appetite.  She has also purposely been restricting her food intake because she is dissatisfied with her body size and weight.  She has panic attacks about twice per week.  She is not sure what triggers them.  They are characterized by feeling clammy, sweaty, short of breath and racing heart.  She rarely leaves the home and yard.  She has social anxiety, fears that others are watching her and judging her.  She reports that she likes to do everything in threes,  for example tapping things 3 times and cutting her food 3 times.  This has been less problematic since her most recent hospitalization.  She reports that she hears a voice inside of her head saying \"just do it, just get it over with\" pertaining to suicide.  She says that for the first time ever, she had some visual hallucinations last week when she went to take her dog into the yard; she saw trees which appeared to be like neon tombs.  When she was in the Emergency Department yesterday she " "saw bugs on the floor.  She denies PTSD symptoms.  She denies homicidal ideation.  She denies gambling.      PAST PSYCHIATRIC HISTORY:  The patient reports that she has been depressed and anxious since childhood; however, previous records indicate generalized anxiety symptoms since puberty and that depression started in her early 20s.  This is her sixth psychiatric hospitalization.  She was hospitalized in [place] in 04/2017.  She had another hospitalization in [place], one in [place] and also psychiatric hospitalizations at [facility] in 05/2017 and 07/2017.  In 05/2017, she had 1 attempt at ECT but had an asthma attack during anesthesia before the ECT was actually completed, was transferred to Medicine and then went home.  She had a course of 6 ECT treatments in 07/2017, the last of which was done in the OR due to the severity of her asthma.  She started psychotherapy 11 years ago and has seen therapists on and off since then.  Her current therapist is Zuleyma Rincon in [place].  Past medications include Wellbutrin, Celexa, Cymbalta, Trintellix, Effexor, Prozac, Lexapro, melatonin, Unisom, Abilify and Paxil.  She has a history of suicide attempts by cutting her ankles.  There is a question about a possible bipolar diagnosis.  Twice in her life she had an elevated mood with a lot of energy that only lasted a couple hours.  She also states that some days following ECT last July she had a lot more energy and felt \"almost manic.\"      SUBSTANCE USE HISTORY:  She smokes marijuana daily.  She denies any other drug use.  She denies problematic drinking.  She smokes cigarettes.       PAST MEDICAL HISTORY:   1.  Asthma.   2.  Kidney stones.   3.  Ptosis, repair of left upper lid.   4.  Knee surgery to repair torn ligaments.   5.  Tubal ligation.      REVIEW OF SYSTEMS:  She is endorsing daily headaches.  A 10-point review of systems was completed and was otherwise negative with " the exception of HPI.      PHYSICAL EXAMINATION:  Please refer to the exam completed by Internal Medicine on 09/17/2017.      ALLERGIES:  Codeine, sulfa, Trintellix.        VITAL SIGNS:  Temperature 98.3, pulse 84, respirations 17, blood pressure 120/51.      LABORATORY DATA:  CBC was within normal limits.      PRIOR TO ADMISSION MEDICATIONS:   Prescriptions Prior to Admission   Medication Sig Dispense Refill Last Dose     levomilnacipran (FETZIMA ER) 80 MG 24 hr capsule Take 1 capsule (80 mg) by mouth daily 30 capsule 0 9/15/2017 at Unknown time     hydrOXYzine (ATARAX) 25 MG tablet Take 1-2 tablets (25-50 mg) by mouth every 4 hours as needed for anxiety 30 tablet 0 9/15/2017 at Unknown time     albuterol (PROAIR HFA/PROVENTIL HFA/VENTOLIN HFA) 108 (90 BASE) MCG/ACT Inhaler Inhale 2 puffs into the lungs every 4 hours as needed for shortness of breath / dyspnea or wheezing   9/15/2017 at Unknown time     fluticasone-vilanterol (BREO ELLIPTA) 200-25 MCG/INH oral inhaler Inhale 1 puff into the lungs daily 1 Inhaler 0 9/15/2017 at Unknown time     montelukast (SINGULAIR) 10 MG tablet Take 10 mg by mouth At Bedtime   9/15/2017 at Unknown time     CLONAZEPAM PO Take 0.5 mg by mouth 3 times daily   9/16/2017 at Unknown time     VITAMIN D, CHOLECALCIFEROL, PO Take 5,000 Units by mouth daily   Past Week at Unknown time     ipratropium - albuterol 0.5 mg/2.5 mg/3 mL (DUONEB) 0.5-2.5 (3) MG/3ML neb solution Take 1 vial (3 mLs) by nebulization every 4 hours as needed for shortness of breath / dyspnea or wheezing 360 mL       traZODone (DESYREL) 50 MG tablet Take 1 tablet (50 mg) by mouth nightly as needed for sleep 15 tablet 0      polyethylene glycol (MIRALAX/GLYCOLAX) Packet Take 17 g by mouth daily as needed for constipation 7 packet 0      senna-docusate (SENOKOT-S;PERICOLACE) 8.6-50 MG per tablet Take 2 tablets by mouth 2 times daily as needed (constipation ) 20 tablet 0      pantoprazole (PROTONIX) 40 MG EC tablet Take 1  tablet (40 mg) by mouth every morning (before breakfast) 15 tablet 0      nicotine (NICODERM CQ) 14 MG/24HR 24 hr patch Place 1 patch onto the skin daily 14 patch 0      fluticasone (VERAMYST) 27.5 MCG/SPRAY spray Spray 2 sprays into both nostrils daily   5/16/2017 at Unknown time       FAMILY HISTORY:  Her mother had depression and her aunt, grandmother and cousin had depression.  Her mother was an alcoholic.  Her cousin committed suicide.      SOCIAL HISTORY:  She was born in [place], [place].  She was raised in [place], [place] by her parents.  She has 3 sisters.  Her father was a .  Her mother was a housewife.  Her mother neglected the children due to her depression.  Her parents  when she was in her 20s.  Her mother moved back to [place].  The patient graduated high school and took some college classes.  She was  to her first  for 2-1/2 years and has 2 daughters, ages 10 and 12, from that marriage.  She has been with her second  for 4 years.  He has a 19-year-old son.  The patient lives in [place] with her , 19-year-old colleen and 2 daughters.  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  She is now on SSDI.  Her  was a , but is on disability from the [employer].  She denies legal problems.  She has never been in the .      MENTAL STATUS EXAMINATION:  She was awake, alert with adequate grooming and hygiene.  Attitude was cooperative.  Eye contact was fair.  Mood was depressed and anxious.  Affect was tearful.  Speech was clear and coherent, somewhat slow.  There was no evidence of tardive dyskinesia, dystonia or tics.  Thought process was linear and goal oriented.  She had no loosening of associations.  She denies homicidal ideation.  She reports some visual misperceptions.  She hears a voice inside her head telling her to kill herself.  She endorses suicidal thoughts with a plan to overdose on her and  her 's pills, drink wine and hang herself.  Insight was good.  Judgment was intact.  She was oriented to person, place, time, date and reason for hospitalization.  Attention span and concentration were somewhat impaired.  Recent and remote memory were somewhat impaired.  She had no peculiar use of language.  Fund of knowledge was appropriate.  Muscle strength and tone were normal.  Gait and station were normal.      DIAGNOSES:   1.  Major depressive disorder, severe, recurrent with psychotic features.   2.  Panic disorder with agoraphobia.   3.  Generalized anxiety disorder.   4.  Asthma.      RECOMMENDATIONS:  Ms. Lees will continue as a voluntary patient on [address] [place].  Her care will be assumed by Dr. Burrell tomorrow.  We will encourage her to be involved in unit activities.  We discussed options for medication management.  Her medications will be continued as prescribed for now.  Fetzima dosing was changed from morning to bedtime.  She will sign a release of information and we will obtain her GeneSight testing from [facility].  She is hoping for another course of ECT.  A longer course of ECT, and/or maintenance ECT if this is possible given that she lives in [place], could be considered.  An Internal Medicine consult and an ECT consult have been ordered.  She will discharge to home when stable.  She does have an outpatient therapist and has a Psychiatry appointment scheduled for November.  I provided her with information regarding the risks and benefits of this treatment plan including medications and she provided consent.         CLAY LUCIO NP             D: 2017 10:19   T: 2017 11:12   MT: SV      Name:     BRIANNA LEES   MRN:      [id number]        Account:      [id number]   :      1985           Admitted:     [id number]      Document: [id number]

## 2017-09-18 ENCOUNTER — TELEPHONE (OUTPATIENT)
Dept: BEHAVIORAL HEALTH | Facility: CLINIC | Age: 32
End: 2017-09-18

## 2017-09-18 ENCOUNTER — HOSPITAL ENCOUNTER (INPATIENT)
Facility: CLINIC | Age: 32
LOS: 4 days | Discharge: HOME | DRG: 885 | End: 2017-09-22
Attending: PSYCHIATRY & NEUROLOGY | Admitting: PSYCHIATRY & NEUROLOGY
Payer: COMMERCIAL

## 2017-09-18 VITALS
RESPIRATION RATE: 16 BRPM | HEIGHT: 64 IN | HEART RATE: 84 BPM | SYSTOLIC BLOOD PRESSURE: 120 MMHG | TEMPERATURE: 97.8 F | DIASTOLIC BLOOD PRESSURE: 51 MMHG | BODY MASS INDEX: 30.39 KG/M2 | WEIGHT: 178 LBS

## 2017-09-18 DIAGNOSIS — F33.2 SEVERE RECURRENT MAJOR DEPRESSION WITHOUT PSYCHOTIC FEATURES (H): ICD-10-CM

## 2017-09-18 PROBLEM — F32.A DEPRESSION WITH SUICIDAL IDEATION: Status: ACTIVE | Noted: 2017-09-18

## 2017-09-18 PROBLEM — R45.851 DEPRESSION WITH SUICIDAL IDEATION: Status: ACTIVE | Noted: 2017-09-18

## 2017-09-18 LAB — INTERPRETATION ECG - MUSE: NORMAL

## 2017-09-18 PROCEDURE — 25000132 ZZH RX MED GY IP 250 OP 250 PS 637: Performed by: NURSE PRACTITIONER

## 2017-09-18 PROCEDURE — 99239 HOSP IP/OBS DSCHRG MGMT >30: CPT | Performed by: PSYCHIATRY & NEUROLOGY

## 2017-09-18 PROCEDURE — 25000132 ZZH RX MED GY IP 250 OP 250 PS 637: Performed by: PSYCHIATRY & NEUROLOGY

## 2017-09-18 PROCEDURE — 12400006 ZZH R&B MH INTERMEDIATE

## 2017-09-18 RX ORDER — HYDROXYZINE HYDROCHLORIDE 25 MG/1
25-50 TABLET, FILM COATED ORAL EVERY 4 HOURS PRN
Status: DISCONTINUED | OUTPATIENT
Start: 2017-09-18 | End: 2017-09-22 | Stop reason: HOSPADM

## 2017-09-18 RX ORDER — OLANZAPINE 10 MG/2ML
5 INJECTION, POWDER, FOR SOLUTION INTRAMUSCULAR EVERY 6 HOURS PRN
Status: DISCONTINUED | OUTPATIENT
Start: 2017-09-18 | End: 2017-09-22 | Stop reason: HOSPADM

## 2017-09-18 RX ORDER — ALBUTEROL SULFATE 90 UG/1
2 AEROSOL, METERED RESPIRATORY (INHALATION) EVERY 4 HOURS PRN
Status: DISCONTINUED | OUTPATIENT
Start: 2017-09-18 | End: 2017-09-22 | Stop reason: HOSPADM

## 2017-09-18 RX ORDER — IPRATROPIUM BROMIDE AND ALBUTEROL SULFATE 2.5; .5 MG/3ML; MG/3ML
3 SOLUTION RESPIRATORY (INHALATION) EVERY 4 HOURS PRN
Status: DISCONTINUED | OUTPATIENT
Start: 2017-09-18 | End: 2017-09-19

## 2017-09-18 RX ORDER — AMOXICILLIN 250 MG
2 CAPSULE ORAL 2 TIMES DAILY PRN
Status: DISCONTINUED | OUTPATIENT
Start: 2017-09-18 | End: 2017-09-22 | Stop reason: HOSPADM

## 2017-09-18 RX ORDER — FLUTICASONE PROPIONATE 50 MCG
2 SPRAY, SUSPENSION (ML) NASAL DAILY
Status: DISCONTINUED | OUTPATIENT
Start: 2017-09-19 | End: 2017-09-22 | Stop reason: HOSPADM

## 2017-09-18 RX ORDER — MONTELUKAST SODIUM 10 MG/1
10 TABLET ORAL AT BEDTIME
Status: DISCONTINUED | OUTPATIENT
Start: 2017-09-18 | End: 2017-09-22 | Stop reason: HOSPADM

## 2017-09-18 RX ORDER — IBUPROFEN 600 MG/1
600 TABLET, FILM COATED ORAL EVERY 6 HOURS PRN
Status: DISCONTINUED | OUTPATIENT
Start: 2017-09-18 | End: 2017-09-22 | Stop reason: HOSPADM

## 2017-09-18 RX ORDER — OLANZAPINE 5 MG/1
5 TABLET, ORALLY DISINTEGRATING ORAL EVERY 6 HOURS PRN
Status: DISCONTINUED | OUTPATIENT
Start: 2017-09-18 | End: 2017-09-22 | Stop reason: HOSPADM

## 2017-09-18 RX ORDER — PANTOPRAZOLE SODIUM 40 MG/1
40 TABLET, DELAYED RELEASE ORAL
Status: DISCONTINUED | OUTPATIENT
Start: 2017-09-19 | End: 2017-09-19

## 2017-09-18 RX ORDER — CLONAZEPAM 0.5 MG/1
0.5 TABLET ORAL 2 TIMES DAILY
Status: DISCONTINUED | OUTPATIENT
Start: 2017-09-18 | End: 2017-09-22 | Stop reason: HOSPADM

## 2017-09-18 RX ORDER — TRAZODONE HYDROCHLORIDE 50 MG/1
50-100 TABLET, FILM COATED ORAL
Status: DISCONTINUED | OUTPATIENT
Start: 2017-09-18 | End: 2017-09-22 | Stop reason: HOSPADM

## 2017-09-18 RX ORDER — NICOTINE 21 MG/24HR
1 PATCH, TRANSDERMAL 24 HOURS TRANSDERMAL DAILY
Status: DISCONTINUED | OUTPATIENT
Start: 2017-09-19 | End: 2017-09-19

## 2017-09-18 RX ADMIN — CLONAZEPAM 0.5 MG: 0.5 TABLET ORAL at 09:12

## 2017-09-18 RX ADMIN — CLONAZEPAM 0.5 MG: 0.5 TABLET ORAL at 22:37

## 2017-09-18 RX ADMIN — CLONAZEPAM 0.5 MG: 0.5 TABLET ORAL at 13:21

## 2017-09-18 RX ADMIN — HYDROXYZINE HYDROCHLORIDE 50 MG: 25 TABLET ORAL at 16:34

## 2017-09-18 RX ADMIN — TRAZODONE HYDROCHLORIDE 100 MG: 50 TABLET ORAL at 22:37

## 2017-09-18 ASSESSMENT — ACTIVITIES OF DAILY LIVING (ADL)
DRESS: SCRUBS (BEHAVIORAL HEALTH)
ORAL_HYGIENE: INDEPENDENT
ORAL_HYGIENE: INDEPENDENT
GROOMING: INDEPENDENT
LAUNDRY: WITH SUPERVISION
LAUNDRY: WITH SUPERVISION
GROOMING: INDEPENDENT
DRESS: INDEPENDENT

## 2017-09-18 NOTE — PROGRESS NOTES
"Pt approached writer, crying and stating that she needed Hydroxyzine. When writer asked if pt needed to talk, she was agreeable to this. Pt stated \"I just want to die.\" Writer responded that pt is safe here, and that her stay here will help her find ways to cope with her feelings of depression. Pt continued to cry, and told writer that she just wanted to be alone.    Pt requested her HS medications early so she could go to bed. Pt reported to writer that she thinks her feelings earlier in the shift were a mild panic attack.     No additional concerns at this time. Will continue to monitor and assess.  "

## 2017-09-18 NOTE — IP AVS SNAPSHOT
MRN:[id number]                      After Visit Summary   9/18/2017    Nuha Lees    MRN: [id number]           Thank you!     Thank you for choosing [facility] for your care. Our goal is always to provide you with excellent care.        Patient Information     Date Of Birth          1985        Designated Caregiver       Most Recent Value    Caregiver    Will someone help with your care after discharge? no      About your hospital stay     You were admitted on:  September 18, 2017 You last received care in the:  [facility]    You were discharged on:  September 22, 2017       Who to Call     For medical emergencies, please call 911.  For non-urgent questions about your medical care, please call your primary care provider or clinic, [phone]          Attending Provider     Provider Specialty    Danilo Leon MD Psychiatry       Primary Care Provider Office Phone # Fax #    Anabel Lord MD, [phone]      Your next 10 appointments already scheduled     Sep 25, 2017  6:45 AM CDT   Electroconvulsive Therapy with  PACU/PROC ROOM   [facility] PACU ([facility])    [address] [place] [address]   [phone]            Oct 27, 2017  7:00 AM CDT   Electroconvulsive Therapy with  PACU/PROC ROOM   [facility] PACU ([facility])    [address] [place] [address]   [phone]              Further instructions from your care team       Behavioral Discharge Planning and Instructions    Summary:   Admitted for ECT    Main Diagnosis:   Major Depressive Disorder, recurrent, severe; Anxiety, NOS; Panic Disorder; Cannabis Dependence; Tobacco Use Disorder; Borderline Personality Disorder    Major Treatments, Procedures and Findings:  Psychiatric assessment. Medication adjustment. ECT    Symptoms to Report: Losing more sleep, Mood getting worse or Thoughts of suicide    Lifestyle  Adjustment:  Follow all treatment recommendations. Develop and follow treatment plan. Utilize positive coping skills to manage symptoms of depression and anxiety.     Psychiatry Follow-up:     If we are unable to schedule a follow up appointment for you prior to discharge, please call as soon as possible to schedule an appointment with one of Dr. Lemus's partners. You will need to be seen within 30 days in order to get your medications refilled.     [facility]   [address] [place] [address]  [address]  [place], [place] [address]  Phone: [phone] / Fax: [phone]     Please follow up with your therapist, Zuleyma Rincno, as previously scheduled.     Zuleyma Rincon  [address] [place] [address], [address]  [place] [address]  [phone]    Resources:   Crisis Intervention: 894.783.7981 or 868-735-2593 (TTY: 610.165.4132).  Call anytime for help.  National [place] on Mental Illness (www.[place].kelly.org): 585.966.6296 or 409-564-9043.  Alcoholics Anonymous (www.alcoholics-anonymous.org): Check your phone book for your local chapter.  National Suicide Prevention Line (www.mentalhealthmn.org): 461-933-PNEE (8146)  [facility] hospital / 474.803.2001    General Medication Instructions:   See your medication sheet(s) for instructions.   Take all medicines as directed.  Make no changes unless your doctor suggests them.   Go to all your doctor visits.  Be sure to have all your required lab tests. This way, your medicines can be refilled on time.  Do not use any drugs not prescribed by your doctor.  Avoid alcohol.      Pending Results     No orders found from 9/16/2017 to 9/19/2017.            Statement of Approval     Ordered          09/22/17 0946  I have reviewed and agree with all the recommendations and orders detailed in this document.  EFFECTIVE NOW     Approved and electronically signed by:  Danilo Leon MD             Admission Information     Date & Time Provider Department Dept. Phone     "2017 Danilo Leon MD [facility] [phone]      Your Vitals Were     Blood Pressure Pulse Temperature Respirations Height Weight    102/62 64 97.9  F (36.6  C) (Oral) 16 1.626 m (5' 4\") 79.5 kg (175 lb 3.2 oz)    Pulse Oximetry BMI (Body Mass Index)                95% 30.07 kg/m2          [email or web address] Information     [employer] lets you send messages to your doctor, view your test results, renew your prescriptions, schedule appointments and more. To sign up, go to [email or web address] . Click on \"Log in\" on the left side of the screen, which will take you to the Welcome page. Then click on \"Sign up Now\" on the right side of the page.     You will be asked to enter the access code listed below, as well as some personal information. Please follow the directions to create your username and password.     Your access code is: [email or web address]  Expires: 2017  5:51 PM     Your access code will  in 90 days. If you need help or a new code, please call your [facility] clinic or [phone].        Care EveryWhere ID     This is your Care EveryWhere ID. This could be used by other organizations to access your [facility] medical records  [id number]        Equal Access to Services     JEET MATIAS AH: Hadgabriel Samuels, waaxda luqadaha, qaybta kaalemil vincent, azeb gastelum. [facility] [phone].    ATENCIÓN: Si habla español, tiene a gray disposición servicios gratuitos de asistencia lingüística. Everette al [phone].    We comply with applicable federal civil rights laws and [place] laws. We do not discriminate on the basis of race, color, national origin, age, disability sex, sexual orientation or gender identity.               Review of your medicines      START taking        Dose / Directions    vilazodone 20 MG Tabs tablet   Commonly known as:  VIIBRYD   Used for:  Severe recurrent major depression without psychotic features (H)        Dose:  " 20 mg   Start taking on:  9/23/2017   Take 1 tablet (20 mg) by mouth daily   Quantity:  30 tablet   Refills:  0         CONTINUE these medicines which may have CHANGED, or have new prescriptions. If we are uncertain of the size of tablets/capsules you have at home, strength may be listed as something that might have changed.        Dose / Directions    traZODone 50 MG tablet   Commonly known as:  DESYREL   This may have changed:  how much to take   Used for:  Severe recurrent major depression without psychotic features (H)        Dose:  50 mg   Take 1 tablet (50 mg) by mouth nightly as needed for sleep   Quantity:  15 tablet   Refills:  0         CONTINUE these medicines which have NOT CHANGED        Dose / Directions    albuterol 108 (90 BASE) MCG/ACT Inhaler   Commonly known as:  PROAIR HFA/PROVENTIL HFA/VENTOLIN HFA        Dose:  2 puff   Inhale 2 puffs into the lungs every 4 hours as needed for shortness of breath / dyspnea or wheezing   Refills:  0       CLONAZEPAM PO        Dose:  0.5 mg   Take 0.5 mg by mouth 3 times daily   Refills:  0       fluticasone 27.5 MCG/SPRAY spray   Commonly known as:  VERAMYST        Dose:  2 spray   Spray 2 sprays into both nostrils daily   Refills:  0       fluticasone-vilanterol 200-25 MCG/INH oral inhaler   Commonly known as:  BREO ELLIPTA   Used for:  Asthma exacerbation        Dose:  1 puff   Inhale 1 puff into the lungs daily   Quantity:  1 Inhaler   Refills:  0       hydrOXYzine 25 MG tablet   Commonly known as:  ATARAX   Used for:  Anxiety        Dose:  25-50 mg   Take 1-2 tablets (25-50 mg) by mouth every 4 hours as needed for anxiety   Quantity:  30 tablet   Refills:  0       IBUPROFEN PO        Dose:  600 mg   Take 600 mg by mouth every 6 hours as needed for moderate pain   Refills:  0       ipratropium - albuterol 0.5 mg/2.5 mg/3 mL 0.5-2.5 (3) MG/3ML neb solution   Commonly known as:  DUONEB        Dose:  3 mL   Take 1 vial (3 mLs) by nebulization every 4 hours as  needed for shortness of breath / dyspnea or wheezing   Quantity:  360 mL   Refills:  0       MILK OF MAGNESIA PO        Dose:  30 mL   Take 30 mLs by mouth nightly as needed   Refills:  0       montelukast 10 MG tablet   Commonly known as:  SINGULAIR        Dose:  10 mg   Take 10 mg by mouth At Bedtime   Refills:  0       nicotine 14 MG/24HR 24 hr patch   Commonly known as:  NICODERM CQ   Used for:  Asthma exacerbation        Dose:  1 patch   Place 1 patch onto the skin daily   Quantity:  14 patch   Refills:  0       pantoprazole 40 MG EC tablet   Commonly known as:  PROTONIX   Used for:  Gastroesophageal reflux disease without esophagitis        Dose:  40 mg   Take 1 tablet (40 mg) by mouth every morning (before breakfast)   Quantity:  15 tablet   Refills:  0       senna-docusate 8.6-50 MG per tablet   Commonly known as:  SENOKOT-S;PERICOLACE   Used for:  Slow transit constipation        Dose:  2 tablet   Take 2 tablets by mouth 2 times daily as needed (constipation )   Quantity:  20 tablet   Refills:  0       VITAMIN D (CHOLECALCIFEROL) PO        Dose:  5000 Units   Take 5,000 Units by mouth daily   Refills:  0         STOP taking     levomilnacipran 80 MG 24 hr capsule   Commonly known as:  FETZIMA ER                Where to get your medicines      These medications were sent to [facility] - [address]  [address] [facility] [address] [address]     Phone:  [phone]     vilazodone 20 MG Tabs tablet                Protect others around you: Learn how to safely use, store and throw away your medicines at www.disposemymeds.org.             Medication List: This is a list of all your medications and when to take them. Check marks below indicate your daily home schedule. Keep this list as a reference.      Medications           Morning Afternoon Evening Bedtime As Needed    albuterol 108 (90 BASE) MCG/ACT Inhaler   Commonly known as:  PROAIR HFA/PROVENTIL HFA/VENTOLIN HFA    Inhale 2 puffs into the lungs every 4 hours as needed for shortness of breath / dyspnea or wheezing                                   CLONAZEPAM PO   Take 0.5 mg by mouth 3 times daily   Last time this was given:  0.5 mg on 9/22/2017 11:20 AM                                         fluticasone 27.5 MCG/SPRAY spray   Commonly known as:  VERAMYST   Spray 2 sprays into both nostrils daily         Return to home schedule.                       fluticasone-vilanterol 200-25 MCG/INH oral inhaler   Commonly known as:  BREO ELLIPTA   Inhale 1 puff into the lungs daily   Last time this was given:  1 puff on 9/22/2017 11:19 AM                                   hydrOXYzine 25 MG tablet   Commonly known as:  ATARAX   Take 1-2 tablets (25-50 mg) by mouth every 4 hours as needed for anxiety   Last time this was given:  50 mg on 9/19/2017  9:03 PM                                   IBUPROFEN PO   Take 600 mg by mouth every 6 hours as needed for moderate pain   Last time this was given:  600 mg on 9/22/2017 11:22 AM                                   ipratropium - albuterol 0.5 mg/2.5 mg/3 mL 0.5-2.5 (3) MG/3ML neb solution   Commonly known as:  DUONEB   Take 1 vial (3 mLs) by nebulization every 4 hours as needed for shortness of breath / dyspnea or wheezing   Last time this was given:  3 mLs on 9/22/2017  8:02 AM                                   MILK OF MAGNESIA PO   Take 30 mLs by mouth nightly as needed                                   montelukast 10 MG tablet   Commonly known as:  SINGULAIR   Take 10 mg by mouth At Bedtime   Last time this was given:  10 mg on 9/21/2017  9:01 PM                                   nicotine 14 MG/24HR 24 hr patch   Commonly known as:  NICODERM CQ   Place 1 patch onto the skin daily                                pantoprazole 40 MG EC tablet   Commonly known as:  PROTONIX   Take 1 tablet (40 mg) by mouth every morning (before breakfast)   Last time this was given:  40 mg on 9/22/2017  5:46 AM             Take before breakfast.                       senna-docusate 8.6-50 MG per tablet   Commonly known as:  SENOKOT-S;PERICOLACE   Take 2 tablets by mouth 2 times daily as needed (constipation )                                   traZODone 50 MG tablet   Commonly known as:  DESYREL   Take 1 tablet (50 mg) by mouth nightly as needed for sleep   Last time this was given:  100 mg on 9/21/2017  9:01 PM                                   vilazodone 20 MG Tabs tablet   Commonly known as:  VIIBRYD   Take 1 tablet (20 mg) by mouth daily   Start taking on:  9/23/2017   Last time this was given:  10 mg on 9/22/2017 11:19 AM                                   VITAMIN D (CHOLECALCIFEROL) PO   Take 5,000 Units by mouth daily         Return to home schedule.                                 More Information        Depression: Tips to Help Yourself  As your healthcare providers help treat your depression, you can also help yourself. Keep in mind that your illness affects you emotionally, physically, mentally, and socially. So full recovery will take time. Take care of your body and your soul, and be patient with yourself as you get better.    Self-care    Educate yourself. Read about treatment and medicine options. If you have the energy, attend local conferences or support groups. Keep a list of useful websites and helpful books and use them as needed. This illness is not your fault. Don t blame yourself for your depression.    Manage early symptoms. If you notice symptoms returning, experience triggers, or identify other factors that may lead to a depressive episode, get help as soon as possible. Ask trusted friends and family to monitor your behavior and let you know if they see anything of concern.    Work with your provider. Find a provider you can trust. Communicate honestly with that person and share information on your treatment for depression and your reaction to medicines.    Be prepared for a crisis. Know what to do if  you experience a crisis. Keep the phone number of a crisis hotline and know the location of your community's urgent care centers and the closest emergency department.    Hold off on big decisions. Depression can cloud your judgment. So wait until you feel better before making major life decisions, such as changing jobs, moving, or getting  or .    Be patient. Recovering from depression is a process. Don t be discouraged if it takes some time to feel better.    Keep it simple. Depression saps your energy and concentration. So you won t be able to do all the things you used to do. Set small goals and do what you can.    Be with others. Don t isolate yourself--you ll only feel worse. Try to be with other people. And take part in fun activities when you can. Go to a movie, [employer]game, [employer] service, or social event. Talk openly with people you can trust. And accept help when it s offered.  Take care of your body  People with depression often lose the desire to take care of themselves. That only makes their problems worse. During treatment and afterward, make a point to:    Exercise. It s a great way to take care of your body. And studies have shown that exercise helps fight depression.    Avoid drugs and alcohol. These may ease the pain in the short term. But they ll only make your problems worse in the long run.    Get relief from stress. Ask your healthcare provider for relaxation exercises and techniques to help relieve stress.    Eat right. A balanced and healthy diet helps keep your body healthy.  Date Last Reviewed: 1/1/2017 2000-2017 [employer]. [address] [place] [address], [place] [address]. All rights reserved. This information is not intended as a substitute for professional medical care. Always follow your healthcare professional's instructions.                Patient Education    Vilazodone hydrochloride Oral tablet    Vilazodone hydrochloride Oral tablet, Vilazodone  hydrochloride Oral tablet, Vilazodone hydrochloride Oral tablet  Vilazodone hydrochloride Oral tablet  What is this medicine?  VILAZODONE (karine AZ oh done) is used to treat depression.  This medicine may be used for other purposes; ask your health care provider or pharmacist if you have questions.  What should I tell my health care provider before I take this medicine?  They need to know if you have any of these conditions:    bipolar disorder or a family history of bipolar disorder    glaucoma    liver disease    low levels of sodium in the blood    receiving electroconvulsive therapy    seizures (convulsions)    suicidal thoughts, plans, or attempt by you or a family member    an unusual or allergic reaction to vilazodone, other medicines, foods, dyes or preservatives    pregnant or trying to get pregnant    breast-feeding  How should I use this medicine?  Take this medicine by mouth with a glass of water. Follow the directions on the prescription label. Take this medicine with food. Take your medicine at regular intervals. Do not take your medicine more often than directed. Do not stop taking this medicine suddenly except upon the advice of your doctor. Stopping this medicine too quickly may cause serious side effects or your condition may worsen.  A special MedGuide will be given to you by the pharmacist with each prescription and refill. Be sure to read this information carefully each time.  Overdosage: If you think you've taken too much of this medicine contact a poison control center or emergency room at once.  NOTE: This medicine is only for you. Do not share this medicine with others.  What if I miss a dose?  If you miss a dose, take it as soon as you can. If it is almost time for your next dose, take only that dose. Do not take double or extra doses.  What may interact with this medicine?  Do not take this medicine with any of the following medications:    linezolid    MAOIs like Carbex, Eldepryl, Marplan,  Nardil, and Parnate    methylene blue (injected into a vein)Carleen's wort  This medicine may also interact with the following medications:    aspirin and aspirin-like medicines    buspirone    certain diet drugs like dexfenfluramine, fenfluramine, phentermine, sibutramine    certain migraine headache medicines like almotriptan, eletriptan, frovatriptan, naratriptan, rizatriptan, sumatriptan, zolmitriptan    certain medicines that treat or prevent blood clots like warfarin, enoxaparin, and dalteparin    erythromycin    ketoconazole    NSAIDS, medicines for pain and inflammation, like ibuprofen or naproxen    other medicines for depression, anxiety, or psychotic disturbances    ritonavir    tramadol    tryptophan  This list may not describe all possible interactions. Give your health care provider a list of all the medicines, herbs, non-prescription drugs, or dietary supplements you use. Also tell them if you smoke, drink alcohol, or use illegal drugs. Some items may interact with your medicine.  What should I watch for while using this medicine?  Tell your doctor if your symptoms do not get better or if they get worse. Visit your doctor or health care professional for regular checks on your progress. Because it may take several weeks to see the full effects of this medicine, it is important to continue your treatment as prescribed by your doctor.  Patients and their families should watch out for new or worsening thoughts of suicide or depression. Also watch out for sudden changes in feelings such as feeling anxious, agitated, panicky, irritable, hostile, aggressive, impulsive, severely restless, overly excited and hyperactive, or not being able to sleep. If this happens, especially at the beginning of treatment or after a change in dose, call your health care professional.  You may get drowsy or dizzy. Do not drive, use machinery, or do anything that needs mental alertness until you know how this medicine affects  you. Do not stand or sit up quickly, especially if you are an older patient. This reduces the risk of dizzy or fainting spells. Alcohol may interfere with the effect of this medicine. Avoid alcoholic drinks.  Your mouth may get dry. Chewing sugarless gum or sucking hard candy, and drinking plenty of water may help. Contact your doctor if the problem does not go away or is severe.  What side effects may I notice from receiving this medicine?  Side effects that you should report to your doctor or health care professional as soon as possible:    allergic reactions like skin rash, itching or hives, swelling of the face, lips, or tongue    fast, irregular heartbeat    seizures    suicidal thoughts or other mood changes    unusual bleeding or bruising    vomiting  Side effects that usually do not require medical attention (Report these to your doctor or health care professional if they continue or are bothersome.):    change in sex drive or performance    diarrhea    drowsiness    dizziness    dry mouth    increased sweating    insomnia    nausea    tremors  This list may not describe all possible side effects. Call your doctor for medical advice about side effects. You may report side effects to FDA at 5-240-FDA-8011.  Where should I keep my medicine?  Keep out of the reach of children.  Store at room temperature between 15 and 30 degrees C (59 to 86 degrees F). Throw away any unused medicine after the expiration date.  NOTE: This sheet is a summary. It may not cover all possible information. If you have questions about this medicine, talk to your doctor, pharmacist, or health care provider.  NOTE:This sheet is a summary. It may not cover all possible information. If you have questions about this medicine, talk to your doctor, pharmacist, or health care provider. Copyright  2016 [employer]

## 2017-09-18 NOTE — TELEPHONE ENCOUNTER
Dr. Patel requested transfer of patient from [facility] to [facility] as patient needs more medical monitoring for ECT treatments.  Dr. Patel talked with Dr. Leon at [place] [address] who accepted care once bed available.  [place]an [place] [address].

## 2017-09-18 NOTE — PROGRESS NOTES
Nuha will be transferred to station 77 at [facility] this evening.  A nurse from that unit will call us about when to transfer her.  Unit 77's phone number is [phone].  Dr. Timmons will do the discharge order and medication reconciliation for discharge.

## 2017-09-18 NOTE — DISCHARGE INSTRUCTIONS
Behavioral Discharge Planning and Instructions      Summary:  You were admitted on 9/16/2017  due to Depression and Suicidal Ideations.  You were treated by Dr. Brad Burrell MD and discharged on 9/18/17 from [facility] to [place] [address] for ECT treatments.      Principal Diagnosis:   1.  Major depressive disorder, severe, recurrent with psychotic features.   2.  Panic disorder with agoraphobia.   3.  Generalized anxiety disorder.   4.  Asthma.     Health Care Follow-up Appointments:     [place] [address] [phone] for ECT treatments.     Psychiatry: Dr. Lemus at [facility]. Appointment set in November [address] [facility] [address], [place] [address]  Phone number: [phone]  Fax: [phone]   Therapy: [facility], [address] [place] [address], [facility] [address], [place] [address], [phone] (weekly appointments, Wednesday's at 11:00)  PCP: Martha Denise NP at [facility], [address] [place] [address], [phone]    Attend all scheduled appointments with your outpatient providers. Call at least 24 hours in advance if you need to reschedule an appointment to ensure continued access to your outpatient providers.   Major Treatments, Procedures and Findings:  You were provided with: a psychiatric assessment, assessed for medical stability, medication evaluation and/or management and group therapy    Symptoms to Report: feeling more aggressive, increased confusion, losing more sleep, mood getting worse or thoughts of suicide    Early warning signs can include: increased depression or anxiety sleep disturbances increased thoughts or behaviors of suicide or self-harm  increased unusual thinking, such as paranoia or hearing voices    Safety and Wellness:  Take all medicines as directed.  Make no changes unless your doctor suggests them.      Follow treatment recommendations.  Refrain from alcohol and non-prescribed drugs.  If there is a concern for  safety, call 911.    Resources:   Crisis Intervention: 481.505.1403 or 015-522-8495 (TTY: 138.373.4122).  Call anytime for help.  National [place] on Mental Illness (www.[place].kelly.org): 712.685.6846 or 307-535-4047.  Suicide Awareness Voices of Education (SAVE) (www.save.org): 618-733-QHNA (3887)  National Suicide Prevention Line (www.mentalhealthmn.org): 251-097-ZGFF (1955)  Mental Health Consumer/Survivor Network of [place] (www.mhcsn.net): 103.528.7754 or 020-372-2646  Mental Health Association of [place] (www.mentalhealth.org): 172.542.7520 or 248-405-8923    The treatment team has appreciated the opportunity to work with you.     If you have any questions or concerns our unit number is [phone].

## 2017-09-18 NOTE — PROGRESS NOTES
"   09/18/17 1227   Behavioral Health   Hallucinations (\"i feel like im losing touch with reality\")   Thinking poor concentration   Orientation person: oriented;place: oriented;date: oriented;time: oriented   Memory baseline memory   Insight poor   Judgement impaired   Eye Contact at examiner   Affect blunted, flat   Mood mood is calm   Physical Appearance/Attire attire appropriate to age and situation   Hygiene neglected grooming - unclean body, hair, teeth   Suicidality thoughts only   Self Injury thoughts only   Elopement (none shown)   Activity isolative;withdrawn   Speech clear;coherent   Medication Sensitivity no stated side effects   Psychomotor / Gait balanced   Psycho Education   Type of Intervention 1:1 intervention   Response participates, initiates socially appropriate   Hours 0.5   Activities of Daily Living   Hygiene/Grooming independent   Oral Hygiene independent   Dress independent   Laundry with supervision   Room Organization independent   pt isolative and withdrawn in room. Pt stated \"feels like im losing touch with reality\" and unable to expand on it. Pt unable to make decisions. Pt needing help to find motivation and distraction from thoughts of SI. Pt stated she has thoughts of SI and are strong but can contract for safety on the unit. Staff able to encourage pt to shower to help not isolate and then pt visible in milieu. Pt did not eat breakfast.   "

## 2017-09-18 NOTE — CONSULTS
I reviewed the chart but did not personally see the patient today.    The patient was admitted for severe depression and suicidal thoughts with clear plan and intent. She had started ECT in the past, but only received 6 treatments per chart review. Based on the excellent documentation provided in the H&P by Brandie Aguero, Ms Lees appears to be an appropriate candidate for ECT.    During past treatment, she had a severe asthma attack following her first treatment, so she was given nebulizer pre-treatment for subsequent treatments. In addition, treatments were completed in the OR. Given the potential risk, I reached out to Dr. Leon at [facility] as their ECT is done in the PACU. This allows for faster response to issues than we would have in our ECT suite. He agreed to this plan and accepted the transfer. A bed became available later in the day. Dr. Burrell had spoken with the patient about the transfer, and she had agreed per his report to me.     Patient is to be discharged and transferred to [facility] psychiatric unit for ongoing treatment and ECT.

## 2017-09-18 NOTE — IP AVS SNAPSHOT
[place] [address] [address] [place] [address]    Phone:  [phone]                                       After Visit Summary   9/18/2017    Nuha Lees    MRN: [id number]           After Visit Summary Signature Page     I have received my discharge instructions, and my questions have been answered. I have discussed any challenges I see with this plan with the nurse or doctor.    ..........................................................................................................................................  Patient/Patient Representative Signature      ..........................................................................................................................................  Patient Representative Print Name and Relationship to Patient    ..................................................               ................................................  Date                                            Time    ..........................................................................................................................................  Reviewed by Signature/Title    ...................................................              ..............................................  Date                                                            Time

## 2017-09-19 PROCEDURE — 25000132 ZZH RX MED GY IP 250 OP 250 PS 637: Performed by: PSYCHIATRY & NEUROLOGY

## 2017-09-19 PROCEDURE — 99207 ZZC NON-BILLABLE SERV PER CHARTING: CPT | Performed by: PHYSICIAN ASSISTANT

## 2017-09-19 PROCEDURE — 25000125 ZZHC RX 250: Performed by: INTERNAL MEDICINE

## 2017-09-19 PROCEDURE — 12400006 ZZH R&B MH INTERMEDIATE

## 2017-09-19 RX ORDER — IPRATROPIUM BROMIDE AND ALBUTEROL SULFATE 2.5; .5 MG/3ML; MG/3ML
3 SOLUTION RESPIRATORY (INHALATION)
Status: DISCONTINUED | OUTPATIENT
Start: 2017-09-20 | End: 2017-09-22 | Stop reason: HOSPADM

## 2017-09-19 RX ORDER — IPRATROPIUM BROMIDE AND ALBUTEROL SULFATE 2.5; .5 MG/3ML; MG/3ML
3 SOLUTION RESPIRATORY (INHALATION) EVERY 4 HOURS PRN
Status: DISCONTINUED | OUTPATIENT
Start: 2017-09-19 | End: 2017-09-22 | Stop reason: HOSPADM

## 2017-09-19 RX ORDER — PANTOPRAZOLE SODIUM 40 MG/1
40 TABLET, DELAYED RELEASE ORAL
Status: DISCONTINUED | OUTPATIENT
Start: 2017-09-20 | End: 2017-09-22 | Stop reason: HOSPADM

## 2017-09-19 RX ORDER — PREDNISONE 20 MG/1
20 TABLET ORAL ONCE
Status: COMPLETED | OUTPATIENT
Start: 2017-09-19 | End: 2017-09-19

## 2017-09-19 RX ADMIN — OLANZAPINE 5 MG: 5 TABLET, ORALLY DISINTEGRATING ORAL at 21:05

## 2017-09-19 RX ADMIN — FLUTICASONE PROPIONATE 2 SPRAY: 50 SPRAY, METERED NASAL at 08:59

## 2017-09-19 RX ADMIN — CLONAZEPAM 0.5 MG: 0.5 TABLET ORAL at 15:40

## 2017-09-19 RX ADMIN — HYDROXYZINE HYDROCHLORIDE 50 MG: 25 TABLET ORAL at 15:41

## 2017-09-19 RX ADMIN — CHOLECALCIFEROL CAP 125 MCG (5000 UNIT) 5000 UNITS: 125 CAP at 08:59

## 2017-09-19 RX ADMIN — CLONAZEPAM 0.5 MG: 0.5 TABLET ORAL at 08:59

## 2017-09-19 RX ADMIN — FLUTICASONE FUROATE AND VILANTEROL TRIFENATATE 1 PUFF: 200; 25 POWDER RESPIRATORY (INHALATION) at 08:58

## 2017-09-19 RX ADMIN — TRAZODONE HYDROCHLORIDE 100 MG: 50 TABLET ORAL at 21:05

## 2017-09-19 RX ADMIN — PREDNISONE 20 MG: 20 TABLET ORAL at 17:51

## 2017-09-19 RX ADMIN — HYDROXYZINE HYDROCHLORIDE 50 MG: 25 TABLET ORAL at 21:03

## 2017-09-19 NOTE — PLAN OF CARE
"Problem: Depressive Symptoms  Goal: Depressive Symptoms  Signs and symptoms of listed problems will be absent or manageable.   Outcome: No Change  Discharge Summary     /51  Pulse 84  Temp 97.8  F (36.6  C)  Resp 16  Ht 1.626 m (5' 4\")  Wt 80.7 kg (178 lb)  BMI 30.55 kg/m2     Pt being discharged and transferred to [facility] at [facility] for ECT treatments. Report was given to Naina on [facility]. Printout of MAR and AVS sent with patient, as well as copy of transport hold paperwork.    Pt states still having SI, but is looking forward to getting treatment.    Pt ate dinner before leaving unit. Was in possession of all belongings upon discharge. Picked up by EMS at 2010 on 09/18/17.    Pt in no acute distress.      "

## 2017-09-19 NOTE — PROGRESS NOTES
09/18/17 9347   Patient Belongings   Did you bring any home meds/supplements to the hospital?  No   Patient Belongings other (see comments)   Disposition of Belongings in locker   Belongings Search Yes   Clothing Search Yes   Second Staff Clara Medrano x3  Underwear  Sweatshirt  PJ pants w string  [place]  Insurance card    Admission:  I am responsible for any personal items that are not sent to the safe or pharmacy.  [facility] is not responsible for loss, theft or damage of any property in my possession.    Signature:  _________________________________ Date: _______  Time: _____                                              Staff Signature:  ____________________________ Date: ________  Time: _____      2nd Staff person, if patient is unable/unwilling to sign:    Signature: ________________________________ Date: ________  Time: _____     Discharge:  [facility] has returned all of my personal belongings:    Signature: _________________________________ Date: ________  Time: _____                                          Staff Signature:  ____________________________ Date: ________  Time: _____

## 2017-09-19 NOTE — H&P
[facility] Psychiatric H&P Note       Initial History   The patient's care was discussed with the treatment team and chart notes were reviewed.     Patient examined for psychiatric admission.     IDENTIFICATION    Ms. Nuha Lees is a 32-year-old female with a history of depression, anxiety, panic disorder, and Cannabis dependence who was admitted to [address] and transferred to [facility]  for ECT. Pt sees PCP Anabel Monzon MD. Pt's outpatient Psychiatrist is Dr. Lemus.      HISTORY OF PRESENT ILLNESS    Ms. Lees has historical diagnoses of major depressive disorder, panic disorder with agoraphobia and generalized anxiety disorder. She was transferred to [facility] from [facility] for ECT in the OR due to concerns about complications related to Pt's asthma. She was most recently hospitalized at [facility] from 2017 through 2017.  During that hospitalization GeneSight testing was reviewed.  She was tapered off Effexor and Fetzima was initiated.  She underwent a course of 6 ECT treatments, the last one completed in the operating room because of her severe asthma.  She discharged to home.  No medication changes were made.  She was medication compliant following discharge.  She switched her Fetzima to bedtime dosing because she was experiencing a headache throughout the day when she took it in the daytime.  The patient reports that she has had some stressors.  Her father has CHF and diabetes.  She was on a leave of absence from work and on short-term disability; however, after this short-term disability  she was approved for SSDI.  She says that she, her , and her two children all have birthdays in mid August through mid September.  She has had some difficulty coping with this, especially as her depressive symptoms were worsening and she was spending more and more time in bed.  was her  "'s 39th birthday party.  She was unable to get out of bed to participate.  She drove herself to [facility] in [place], and she was referred for psychiatric hospitalization. Her mood is depressed.  She has suicidal thoughts with a plan to overdose on all of her and her 's medications, drink wine and then hang herself from the bathroom basement door so that her children do not find her.  She says she has written letters and had selected 10/01/2017 after date she would follow through with this.  She has had suicidal thoughts since early August.  She has low interest, anhedonia, hypersomnia, low energy and low motivation.  Her concentration is \"horrible.\"  She has some memory impairment for memories over the last year.  She has feelings of hopelessness, helplessness, worthlessness and guilt.  She has been crying frequently.  She has had a low appetite.  She has also purposely been restricting her food intake because she is dissatisfied with her body size and weight.  She has panic attacks about twice per week.  She is not sure what triggers them.  They are characterized by feeling clammy, sweaty, short of breath and racing heart.  She rarely leaves the home and yard.  She has social anxiety, fears that others are watching her and judging her.  She reports that she likes to do everything in threes,  for example tapping things 3 times and cutting her food 3 times.  This has been less problematic since her most recent hospitalization.  She reports that she hears a voice inside of her head saying \"just do it, just get it over with\" pertaining to suicide.  She says that for the first time ever, she had some visual hallucinations last week when she went to take her dog into the yard; she saw trees which appeared to be like neon tombs.  When she was in the Emergency Department yesterday she saw bugs on the floor. Discussed Emsam. Ordered a Pulmonology consult due to history of severe asthma and asthma attack " during ECT. Discontinued Fetzima. Start ECT tomorrow.     CHEMICAL DEPENDENCY HISTORY    Pt smokes Cannabis daily. She denies any other drug use. She denies alcohol use. The patient smokes a pack of cigarettes per day. Dr. Leon reviewed the side effects and complications of cannabis, particularly for an individual with a mental illness. Pt acknowledged.      PAST  PSYCHIATRIC HISTORY    Pt is currently taking Klonopin, Hydroxyzine, and Fetzima 80mg. Pt was transferred to [facility] from [facility]. She started a course of ECT in May 2017 but it was aborted because she had an asthma attack during the procedure. Depression started in her early 20s.  She has had periods of remission lasting up to 1-1/2 years several times in the past.  Current depressive episode has been going on for over 2 years.  It started when her dad got very sick. She has a history of panic attacks. Generalized anxiety symptoms since childhood. These occur out of the blue about once a week lasting 5-15 minutes. This is her sixth psychiatric hospitalization. She was hospitalized in [place] in 04/2017.  She had another hospitalization in [place], one in [place], and also psychiatric hospitalizations at [facility] in 5/2017 and 7/2017. In 5/2017 she had 1 attempt at ECT but had an asthma attack during anesthesia before the ECT was actually completed, was transferred to Medicine, and then went home.  She had a course of 6 ECT treatments in 07/2017, the last of which was done in the OR due to the severity of her asthma.  She started psychotherapy 11 years ago and has seen therapists on and off since then. Her current therapist is [facility] Rincon in [place]. Past medications include Wellbutrin, Celexa, Cymbalta, Trintellix, Effexor, Prozac, Lexapro, Unisom, Abilify, Fetzima, and Paxil. Abilify made her gain 50lbs. Wellbutrin made her more depressed. Celexa helped originally, but stopped working.  Fetzima gives her high blood pressure and headaches. She has been on Trintellix but had an allergic reaction. The rest were ineffective. She had a suicide attempt in the past by trying to slit her ankles.     FAMILY HISTORY    Her mother had depression and her aunt, grandmother and cousin had depression. Her mother was an alcoholic. Her cousin committed suicide.     SOCIAL HISTORY    Pt lives with her  and 3 children in [place]. Pt reports her parents were neglectful. She was born in [place], [place]. She was raised in [place], [place] by her parents. She has 3 sisters. Her father was a , her mother was a housewife. Her mother neglected the children due to her depression. Her parents  when she was in her 20s. Her mother moved back to [place]. Pt graduated high school and took some college classes.  She was  to her first  for 2.5 years and has 2 daughters (ages 10 and 12) from that marriage. She has been with her second  for 4 years, he has a 19-year-old son from a previous partner. The patient lives in [place] with her , 19-year-old colleen, and 2 daughters. 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 She is now on SSDI. Her  was a , but is on disability from the [employer]. She denies legal problems. She has never been in the .      Hospital Course     Pt was transferred to [facility] for ECT in the OR due to concerns about complications related to her asthma. On 9/19 She presents with severe depression, and borderline traits. Discontinued Fetzima and arranged to begin a course of 6 ECT treatments tomorrow 9/20.      Medications     Prescriptions Prior to Admission   Medication Sig Dispense Refill Last Dose     levomilnacipran (FETZIMA ER) 80 MG 24 hr capsule Take 1 capsule (80 mg) by mouth daily 30 capsule 0 Past Week at Unknown time     hydrOXYzine (ATARAX) 25 MG tablet Take 1-2 tablets (25-50 mg) by  mouth every 4 hours as needed for anxiety 30 tablet 0 9/18/2017 at Unknown time     traZODone (DESYREL) 50 MG tablet Take 1 tablet (50 mg) by mouth nightly as needed for sleep (Patient taking differently: Take  mg by mouth nightly as needed for sleep ) 15 tablet 0 Past Week at Unknown time     pantoprazole (PROTONIX) 40 MG EC tablet Take 1 tablet (40 mg) by mouth every morning (before breakfast) 15 tablet 0 Past Week at Unknown time     nicotine (NICODERM CQ) 14 MG/24HR 24 hr patch Place 1 patch onto the skin daily 14 patch 0 Past Week at Unknown time     montelukast (SINGULAIR) 10 MG tablet Take 10 mg by mouth At Bedtime   Past Week at Unknown time     CLONAZEPAM PO Take 0.5 mg by mouth 3 times daily   9/18/2017 at Unknown time     Magnesium Hydroxide (MILK OF MAGNESIA PO) Take 30 mLs by mouth nightly as needed   Unknown at Unknown time     IBUPROFEN PO Take 600 mg by mouth every 6 hours as needed for moderate pain   Unknown at Unknown time     albuterol (PROAIR HFA/PROVENTIL HFA/VENTOLIN HFA) 108 (90 BASE) MCG/ACT Inhaler Inhale 2 puffs into the lungs every 4 hours as needed for shortness of breath / dyspnea or wheezing   9/15/2017 at Unknown time     fluticasone-vilanterol (BREO ELLIPTA) 200-25 MCG/INH oral inhaler Inhale 1 puff into the lungs daily 1 Inhaler 0 Unknown at Unknown time     ipratropium - albuterol 0.5 mg/2.5 mg/3 mL (DUONEB) 0.5-2.5 (3) MG/3ML neb solution Take 1 vial (3 mLs) by nebulization every 4 hours as needed for shortness of breath / dyspnea or wheezing 360 mL  Unknown at Unknown time     senna-docusate (SENOKOT-S;PERICOLACE) 8.6-50 MG per tablet Take 2 tablets by mouth 2 times daily as needed (constipation ) 20 tablet 0 Unknown at Unknown time     fluticasone (VERAMYST) 27.5 MCG/SPRAY spray Spray 2 sprays into both nostrils daily   Unknown at Unknown time     VITAMIN D, CHOLECALCIFEROL, PO Take 5,000 Units by mouth daily   Unknown at Unknown time       Scheduled Medications:     "fluticasone-vilanterol  1 puff Inhalation Daily     levomilnacipran  80 mg Oral Daily     montelukast  10 mg Oral At Bedtime     pantoprazole  40 mg Oral QAM AC     cholecalciferol  5,000 Units Oral Daily     clonazePAM  0.5 mg Oral BID     nicotine   Transdermal Q8H     nicotine   Transdermal Daily     nicotine  1 patch Transdermal Daily     fluticasone  2 spray Both Nostrils Daily     PRNs:  albuterol, hydrOXYzine, ibuprofen (ADVIL/MOTRIN) tablet 600 mg, ipratropium - albuterol 0.5 mg/2.5 mg/3 mL, magnesium hydroxide, senna-docusate, traZODone, OLANZapine **OR** OLANZapine zydis      Allergies      Allergies   Allergen Reactions     Codeine Sulfate      Patient reported tolerated Ultram in the past     Sulfa Drugs      Trintellix [Vortioxetine]         Previous Medical History     Past Medical History:   Diagnosis Date     Asthma         Medical Review of Systems   /72  Pulse 54  Temp 98.1  F (36.7  C) (Oral)  Resp 16  Ht 1.626 m (5' 4\")  Wt 80.2 kg (176 lb 14.4 oz)  BMI 30.36 kg/m2  Body mass index is 30.36 kg/(m^2).  Previous 10-point ROS completed by NATHAN Campos CNP on 9/17/2017 reviewed by Danilo Leon MD on September 19, 2017 and is unchanged except for those problems mentioned within the HPI.     Mental Status Examination     Appearance Sitting in chair, dressed in scrubs. Appears stated age.   Attitude Cooperative   Orientation Oriented to person, place, time   Eye Contact Poor   Speech Lachrymose   Language Normal   Psychomotor Behavior Normal   Mood Depressed, anxious   Affect Tearful   Thought Process Goal-Oriented, Intact   Associations Intact   Thought Content Patient is currently negative for suicide ideation, negative for plan or intent, able to contract no self harm and identify barriers to suicide.  Negative for obsessions, compulsions or psychosis.      Fund of Knowledge Limited   Insight Very poor   Judgement Impaired   Attention Span & Concentration Alert "   Recent & Remote Memory Intact   Gait Normal   Muscle Tone Intact      Labs   Labs reviewed.  No results found for this or any previous visit (from the past 24 hour(s)).       Impression     Ms. Nuha Lees is a 32-year-old female with a history of depression, anxiety, panic disorder, and Cannabis dependence who was admitted to [address] and transferred to [facility]  for ECT, as ECT at [facility] is performed in the OR and there are concerns about complications related to Pt's asthma. Pt presents with severe depression and borderline traits. Discontinued Fetzima due to a history of side effects, including headaches and high blood pressure, and lack of efficacy. Arranged to commence a course of 6 ECTs starting tomorrow 9/20/2017 for treatment resistant depression.      Diagnoses   1. Major depressive disorder, recurrent, severe  2. Anxiety, NOS  3. Panic disorder  4. Cannabis dependence  5. Tobacco use disorder  6. Borderline personality disorder     Plan     1. Explained side effects, benefits, and complications of medications to the patient, Pt gave verbal consent.  2. Medication changes: continue medications  3. Discussed treatment plan with patient and team.  4. Projected length of stay: until Pt is stabilized  5. Ordered Pulmonology consult due to severe asthma and asthma attack during ECT  6. Begin a course of 6 ECTs tomorrow 9/20/2017      Attestation:   Patient has been seen and evaluated by me, Danilo Leon MD.    Patient ID:  Name: Nuha Lees    MRN: [id number]  Admission: 9/18/2017     YOB: 1985

## 2017-09-19 NOTE — CONSULTS
Pulmonary Medicine Consultation        Date of Admission: 9/18/2017  Primary Attending:  Danilo Leon MD  Consulting Physician: Milton Pablo MD      History:    Nuha is a 32 year old  Female with PMHx of severe depression, Anxiety, suicidal ideationsaspiration pneumonitis following ECT, Asthma.  She is a former smoker and takes Breo 200/25, Singulair, flonase nasal spray and albuterol as needed.  She admits to having an episode of aspiration following her last ECT  Denies any dyspnea and feels at her respiratory baseline. Denies Chest pain or palpitations, Orthopnea or PND.  Constipation or diarrhea with Regular bowel movements. Denies Cough,no  Hemoptysis,no sputum. Denies Fever/Chills/Sweats, no Wt. Loss.  We are consulted for further recommendations for Asthma  Control and to minimize risk of asthma exacerbation following ECT planned for tomorrow at 6 am    Review of system:   ROS is negative except for items mentioned above and in HPI.           Prior medical history:  Past Medical History:   Diagnosis Date     Asthma        Past Surgical History:   Procedure Laterality Date     KNEE SURGERY Right     Roller derby injury      REPAIR PTOSIS  10/5/2011    Procedure:REPAIR PTOSIS; Left Upper Lid Ptosis Repair; Surgeon:MONICA ANDREWS; Location: [facility]     TUBAL LIGATION  2016       Patient Active Problem List   Diagnosis     MENTAL HEALTH     Severe recurrent major depression without psychotic features (H)     Aspiration pneumonitis (H)     Depression     Suicidal ideation     Depression with suicidal ideation       Social History     Social History     Social History     Marital status:      Spouse name: N/A     Number of children: N/A     Years of education: N/A     Occupational History     Not on file.     Social History Main Topics     Smoking status: Former Smoker     Smokeless tobacco: Not on file     Alcohol use Yes     Drug use: No     Sexual activity: Not on file     Other Topics  Concern     Not on file     Social History Narrative         Family History  Family History   Problem Relation Age of Onset     Coronary Artery Disease Father      Heart Failure Father      DIABETES Father      CANCER Father      DIABETES Maternal Grandmother      Depression Mother      Anxiety Disorder Mother            Medications  No current outpatient prescriptions on file.     Current Facility-Administered Medications Ordered in Epic   Medication Dose Route Frequency Last Rate Last Dose     [START ON 9/20/2017] pantoprazole (PROTONIX) EC tablet 40 mg  40 mg Oral Once per day on Mon Wed Fri         albuterol (PROAIR HFA/PROVENTIL HFA/VENTOLIN HFA) Inhaler 2 puff  2 puff Inhalation Q4H PRN         fluticasone-vilanterol (BREO ELLIPTA) 200-25 MCG/INH oral inhaler 1 puff  1 puff Inhalation Daily   1 puff at 09/19/17 0858     hydrOXYzine (ATARAX) tablet 25-50 mg  25-50 mg Oral Q4H PRN   50 mg at 09/19/17 1541     ibuprofen (ADVIL/MOTRIN) tablet 600 mg  600 mg Oral Q6H PRN         ipratropium - albuterol 0.5 mg/2.5 mg/3 mL (DUONEB) neb solution 3 mL  3 mL Nebulization Q4H PRN         magnesium hydroxide (MILK OF MAGNESIA) suspension 30 mL  30 mL Oral At Bedtime PRN         montelukast (SINGULAIR) tablet 10 mg  10 mg Oral At Bedtime         senna-docusate (SENOKOT-S;PERICOLACE) 8.6-50 MG per tablet 2 tablet  2 tablet Oral BID PRN         traZODone (DESYREL) tablet  mg   mg Oral At Bedtime PRN   100 mg at 09/18/17 2237     cholecalciferol (vitamin D3) capsule CAPS 5,000 Units  5,000 Units Oral Daily   5,000 Units at 09/19/17 0859     clonazePAM (klonoPIN) tablet 0.5 mg  0.5 mg Oral BID   0.5 mg at 09/19/17 1540     OLANZapine (zyPREXA) injection 5 mg  5 mg Intramuscular Q6H PRN        Or     OLANZapine zydis (zyPREXA) ODT tab 5 mg  5 mg Oral Q6H PRN         fluticasone (FLONASE) 50 MCG/ACT spray 2 spray  2 spray Both Nostrils Daily   2 spray at 09/19/17 0859     No current [facility]-ordered outpatient  "prescriptions on file.       Allergies   Allergen Reactions     Codeine Sulfate      Patient reported tolerated Ultram in the past     Sulfa Drugs      Trintellix [Vortioxetine]                Physical Examination:   Vitals:    17 2045 17 0822   BP: 114/72 97/68   Pulse: 54 85   Resp:    Temp: 98.1  F (36.7  C) 98.1  F (36.7  C)   TempSrc: Oral Oral   Weight: 80.2 kg (176 lb 14.4 oz) 79.5 kg (175 lb 3.2 oz)   Height: 1.626 m (5' 4\")      Body mass index is 30.07 kg/(m^2).  Temp (24hrs), Av  F (36.7  C), Min:97.8  F (36.6  C), Max:98.1  F (36.7  C)        Constitutional:  Appears comfortable, but depresed  HENT:  mucous membranes moist.  Eyes: PERRLA, no icterus, no pallor.   Neck: No lymphadenopathy or thyromegaly, trachea midline, no carotid bruits.  Cardiovascular: Regular rate and rythym, no murmurs, rubs or gallops, no peripheral edema.  Respiratory/Chest: No use of accessory muscle, chest was clear to auscultation bilaterally, no wheezing, no crackles.  Gastrointestinal: Abdomen was soft, non-tender, non-distended, no masses felt, no hepatosplenomegaly.  Musculoskeletal: No clubbing or cyanosis, full range of motion in all extremities.  Neurological: No focal motor or sensory deficits. DTR's are 2+ and symmetric.  Skin: No skin rash, hives, petechiae, or breakdown.        CMP  Recent Labs  Lab 17  1134      POTASSIUM 3.8   CHLORIDE 106   CO2 23   ANIONGAP 10   GLC 80   BUN 9   CR 0.76   GFRESTIMATED 88   GFRESTBLACK >90   NICHOLAS 8.8   PROTTOTAL 7.3   ALBUMIN 4.0   BILITOTAL 0.8   ALKPHOS 54   AST 11   ALT 17     CBC  Recent Labs  Lab 17  0722   WBC 8.1   RBC 4.50   HGB 13.3   HCT 39.6   MCV 88   MCH 29.6   MCHC 33.6   RDW 13.0        INRNo lab results found in last 7 days.  Arterial Blood GasNo lab results found in last 7 days.  No results for input(s): CULT in the last 168 hours.    Diagnostic Studies:  Chest Radiology:     CHEST TWO VIEWS   2017 10:13 " AM     HISTORY:  Left chest pain and cough.     COMPARISON:  5/19/2017.     FINDINGS:  The heart size is normal. No mediastinal pathology is seen.  The lungs are clear. The pulmonary vasculature is normal. No  pneumothorax or pleural effusion is seen.          IMPRESSION:  Unremarkable chest. I see no definite change since the  previous examination.              Assessment:     32 year old  Female with PMHx of severe depression, Anxiety, suicidal ideationsaspiration pneumonitis following ECT, Asthma.  She is a former smoker and takes Breo 200/25, Singulair, flonase nasal spray and albuterol as needed.  She admits to having an episode of aspiration following her last ECT  Denies any dyspnea and feels at her respiratory baseline. Denies Chest pain or palpitations, Orthopnea or PND.  Constipation or diarrhea with Regular bowel movements. Denies Cough,no  Hemoptysis,no sputum. Denies Fever/Chills/Sweats, no Wt. Loss.  We are consulted for further recommendations for Asthma  Control and to minimize risk of asthma exacerbation following ECT planned for tomorrow at 6 am    Pulmonary Diagnoses: Asthma unspec J45.909    Recommendations        Patient has normal lung exam and no respiratory complaints at the moment  Chances of recurrent aspiration following ECT are low   Significant anxiety may play a role on dyspnea complaints, make sure anxiety therapy is optimized   Recommend Duoneb treatment 30 minutes prior to ECT  Continue Breo 200/25 once per day a day, should take prior to ECT tomorrow am  Give prednisone 20 mg daily wm orally for 2 doses  First dose now and one tomorrow pm.  Physical activity as tolerated  Continue Singulair 10 mg once per day at night  Flonase 50 mcg twice per day  please call if questions         Milton Tomas M.D.  Pulmonary, Critical Care and Sleep Medicine  [facility]   Pager: [phone]  Office:[phone]

## 2017-09-19 NOTE — PROGRESS NOTES
"Pt was admitted on a voluntary status from station 20 for depression with SI.  She was transferred to receive ECT in an OR.  Pt reports that ECT has helped in the past for only 2 weeks.  Depressed, hopeless, helpless, endorses current SI and SIB.  She would not clearly verbally contract for safety, eventually stated, \"If I have to.\"  Reports hearing voices that state, \"Either my name or this is stupid or just kill yourself.\"  States she is being admitted, \"Because they don't want to deal with me over there.\" Overt personality disorder traits. \"I don't see the point nothing is going to help anyways.\" Very irritable and sarcastic throughout the admission process.  When asked about current stressors she replied, \"Nothing, I've got a great fucking life ok.\"  Identifies her  as a source of support and signed a PETE for him.  States she smoke marijuana daily. Attempted suicide via \"slitting my ankles.\"  States that almost everyone on her maternal side has attempted suicide.  Says she has a therapist and psychiatrist that do not help.    Nursing assessment complete including patient and medication profiles. Risk assessments completed addressing suicide,fall,skin,nutrition and safety issues. Care plan initiated. Assessments reviewed with physician and admit orders received.  Welcome packet reviewed with patient. Information reviewed includes getting emergency help, preventing infections, understanding your care, using medication safely, reducing falls, preventing pressure ulcers, smoking cessation, powerful choices and Patients Bill of Rights. Pt. given tour of the unit and instruction on use of facility including emergency call light. Program schedule reviewed with patient. Questions regarding the unit addressed. Pt. Search completed and belongings inventoried.        "

## 2017-09-19 NOTE — PLAN OF CARE
Problem: Depressive Symptoms  Goal: Depressive Symptoms  Signs and symptoms of listed problems will be absent or manageable.   Outcome: No Change  Pt has been isolative and withdrawn in her room the whole shift today. Attended no groups. Presents a flat, depressed, irritable affect. Pt stated during morning vitals that she did not sleep well at all. Pt filled out her menu, but only filled out for lunch and dinner; did not eat her breakfast. Pt is not bright upon approach and communication.

## 2017-09-20 ENCOUNTER — ANESTHESIA EVENT (OUTPATIENT)
Dept: SURGERY | Facility: CLINIC | Age: 32
End: 2017-09-20

## 2017-09-20 ENCOUNTER — ANESTHESIA (OUTPATIENT)
Dept: SURGERY | Facility: CLINIC | Age: 32
End: 2017-09-20

## 2017-09-20 PROCEDURE — 25000132 ZZH RX MED GY IP 250 OP 250 PS 637: Performed by: PSYCHIATRY & NEUROLOGY

## 2017-09-20 PROCEDURE — 97150 GROUP THERAPEUTIC PROCEDURES: CPT | Mod: GO

## 2017-09-20 PROCEDURE — 12400006 ZZH R&B MH INTERMEDIATE

## 2017-09-20 PROCEDURE — 25000128 H RX IP 250 OP 636: Performed by: NURSE ANESTHETIST, CERTIFIED REGISTERED

## 2017-09-20 PROCEDURE — 25000128 H RX IP 250 OP 636: Performed by: ANESTHESIOLOGY

## 2017-09-20 PROCEDURE — 25000125 ZZHC RX 250: Performed by: ANESTHESIOLOGY

## 2017-09-20 PROCEDURE — 25000125 ZZHC RX 250: Performed by: NURSE ANESTHETIST, CERTIFIED REGISTERED

## 2017-09-20 PROCEDURE — 40000671 ZZH STATISTIC ANESTHESIA CASE

## 2017-09-20 PROCEDURE — 90870 ELECTROCONVULSIVE THERAPY: CPT

## 2017-09-20 PROCEDURE — 25000125 ZZHC RX 250: Performed by: INTERNAL MEDICINE

## 2017-09-20 PROCEDURE — 90853 GROUP PSYCHOTHERAPY: CPT

## 2017-09-20 RX ORDER — SODIUM CHLORIDE, SODIUM LACTATE, POTASSIUM CHLORIDE, CALCIUM CHLORIDE 600; 310; 30; 20 MG/100ML; MG/100ML; MG/100ML; MG/100ML
500 INJECTION, SOLUTION INTRAVENOUS CONTINUOUS
Status: DISCONTINUED | OUTPATIENT
Start: 2017-09-20 | End: 2017-09-22 | Stop reason: HOSPADM

## 2017-09-20 RX ADMIN — TRAZODONE HYDROCHLORIDE 50 MG: 50 TABLET ORAL at 21:35

## 2017-09-20 RX ADMIN — CLONAZEPAM 0.5 MG: 0.5 TABLET ORAL at 21:32

## 2017-09-20 RX ADMIN — MONTELUKAST SODIUM 10 MG: 10 TABLET, FILM COATED ORAL at 21:32

## 2017-09-20 RX ADMIN — SODIUM CHLORIDE, SODIUM LACTATE, POTASSIUM CHLORIDE, CALCIUM CHLORIDE: 600; 310; 30; 20 INJECTION, SOLUTION INTRAVENOUS at 07:10

## 2017-09-20 RX ADMIN — CLONAZEPAM 0.5 MG: 0.5 TABLET ORAL at 08:19

## 2017-09-20 RX ADMIN — LIDOCAINE HYDROCHLORIDE 1 ML: 10 INJECTION, SOLUTION EPIDURAL; INFILTRATION; INTRACAUDAL; PERINEURAL at 06:28

## 2017-09-20 RX ADMIN — CHOLECALCIFEROL CAP 125 MCG (5000 UNIT) 5000 UNITS: 125 CAP at 08:19

## 2017-09-20 RX ADMIN — SODIUM CHLORIDE, SODIUM LACTATE, POTASSIUM CHLORIDE, CALCIUM CHLORIDE 500 ML: 600; 310; 30; 20 INJECTION, SOLUTION INTRAVENOUS at 06:28

## 2017-09-20 RX ADMIN — FLUTICASONE PROPIONATE 2 SPRAY: 50 SPRAY, METERED NASAL at 08:19

## 2017-09-20 RX ADMIN — SUCCINYLCHOLINE CHLORIDE 100 MG: 20 INJECTION, SOLUTION INTRAMUSCULAR; INTRAVENOUS at 07:19

## 2017-09-20 RX ADMIN — IPRATROPIUM BROMIDE AND ALBUTEROL SULFATE 3 ML: .5; 3 SOLUTION RESPIRATORY (INHALATION) at 06:31

## 2017-09-20 RX ADMIN — FLUTICASONE FUROATE AND VILANTEROL TRIFENATATE 1 PUFF: 200; 25 POWDER RESPIRATORY (INHALATION) at 08:19

## 2017-09-20 RX ADMIN — METHOHEXITAL SODIUM 100 MG: 500 INJECTION, POWDER, LYOPHILIZED, FOR SOLUTION INTRAMUSCULAR; INTRAVENOUS; RECTAL at 07:19

## 2017-09-20 RX ADMIN — PANTOPRAZOLE SODIUM 40 MG: 40 TABLET, DELAYED RELEASE ORAL at 05:50

## 2017-09-20 NOTE — PROCEDURES
[facility] ECT Procedure Note     Nuha Lees [id number]   32 year old 1985     Patient Status: Inpatient    Allergies   Allergen Reactions     Codeine Sulfate      Patient reported tolerated Ultram in the past     Sulfa Drugs      Trintellix [Vortioxetine]        Weight:  175 lbs 3.2 oz    Patient Preparations: Other (comment)         Diagnosis:   Major depression       Indications for ECT:   Medications ineffective and History of good ECT response in one or more previous episodes of illness       Pause for the Cause:     Right patient Yes   Right procedure/laterality settings: Yes   Right diagnosis Yes          Intra-Procedure Documentation:     Date:  9/20/2017  Time:  6:46 AM    ECT #    Treatment number this series: 1   Total treatment number: 7   Type of ECT:  Bilateral, standard    ECT Medications administered: Brevital: 100mg  Succinyl Choline: 100mg         Clinical Narrative:     ECT was administered by Thymatron machine.  Pt has been medically cleared for procedure, consent signed. Side effects, Risks and benefits reviewed.    ECT Strip Summary:   Energy Level: 50 percent  Motor Seizure Duration: 30 seconds  EEG Seizure Duration: 30 seconds    Complications: Yes Was intubated in the past due to her asthma    Plan: 2nd ECT 9/22/17  Outpatient Psychiatrist: Dr Lemus

## 2017-09-20 NOTE — PLAN OF CARE
"Problem: Depressive Symptoms  Goal: Depressive Symptoms  Signs and symptoms of listed problems will be absent or manageable.   Outcome: No Change  Pt was upset at the beginning of the shift since her Klonopin is scheduled BID.  Her PTA med lists it as scheduled TID and it was ordered differently on the [facility] paperwork.  Admitting psychiatrist ordered it BID.  Pt wants to discuss this with her attending psychiatrist tomorrow.  She was given her hs Klonopin early since she can't have it after 1600 on days prior to ECT anyways.  She was agitated and crying.  Stated, \"I'm tired of fighting this.\"  Reported having a horrible day and that she just wants to go home.  Reported auditory hallucinations.  Explained that she has Zyprexa PRN available and potential benefits.  She was seen by Pulmonary and cleared for ECT- see previous note.  Pt is supposed to receive Prontonix in the morning prior to ECT.  She is scheduled for a neb tx and PACU will administer that.  Mostly isolative and withdrawn in her room.  Stated, \"I just want to be left alone.\"  Spent a little bit of time watching tv.  Cont to appear anxious, depressed, and irritable.      "

## 2017-09-20 NOTE — ANESTHESIA PREPROCEDURE EVALUATION
Procedure: * No procedures listed *  Preop diagnosis: * No pre-op diagnosis entered *    Allergies   Allergen Reactions     Codeine Sulfate      Patient reported tolerated Ultram in the past     Sulfa Drugs      Trintellix [Vortioxetine]      Past Medical History:   Diagnosis Date     Asthma      Past Surgical History:   Procedure Laterality Date     KNEE SURGERY Right     Roller derby injury      REPAIR PTOSIS  10/5/2011    Procedure:REPAIR PTOSIS; Left Upper Lid Ptosis Repair; Surgeon:MONICA ANDREWS; Location: [facility]     TUBAL LIGATION  2016     Prior to Admission medications    Medication Sig Start Date End Date Taking? Authorizing Provider   levomilnacipran (FETZIMA ER) 80 MG 24 hr capsule Take 1 capsule (80 mg) by mouth daily 7/27/17  Yes Heber Lemus MD   hydrOXYzine (ATARAX) 25 MG tablet Take 1-2 tablets (25-50 mg) by mouth every 4 hours as needed for anxiety 5/21/17  Yes Camilo Ross MD   traZODone (DESYREL) 50 MG tablet Take 1 tablet (50 mg) by mouth nightly as needed for sleep  Patient taking differently: Take  mg by mouth nightly as needed for sleep  5/21/17  Yes Camilo Ross MD   pantoprazole (PROTONIX) 40 MG EC tablet Take 1 tablet (40 mg) by mouth every morning (before breakfast) 5/21/17  Yes Camilo Ross MD   nicotine (NICODERM CQ) 14 MG/24HR 24 hr patch Place 1 patch onto the skin daily 5/21/17  Yes Camilo Ross MD   montelukast (SINGULAIR) 10 MG tablet Take 10 mg by mouth At Bedtime   Yes Reported, Patient   CLONAZEPAM PO Take 0.5 mg by mouth 3 times daily   Yes Reported, Patient   Magnesium Hydroxide (MILK OF MAGNESIA PO) Take 30 mLs by mouth nightly as needed 9/18/17   Reported, Patient   IBUPROFEN PO Take 600 mg by mouth every 6 hours as needed for moderate pain    Reported, Patient   albuterol (PROAIR HFA/PROVENTIL HFA/VENTOLIN HFA) 108 (90 BASE) MCG/ACT Inhaler Inhale 2 puffs into the lungs every 4 hours as needed for shortness of breath / dyspnea or  wheezing 5/21/17   Camilo Ross MD   fluticasone-vilanterol (BREO ELLIPTA) 200-25 MCG/INH oral inhaler Inhale 1 puff into the lungs daily 5/21/17   Camilo Ross MD   ipratropium - albuterol 0.5 mg/2.5 mg/3 mL (DUONEB) 0.5-2.5 (3) MG/3ML neb solution Take 1 vial (3 mLs) by nebulization every 4 hours as needed for shortness of breath / dyspnea or wheezing 5/21/17   Camilo Ross MD   senna-docusate (SENOKOT-S;PERICOLACE) 8.6-50 MG per tablet Take 2 tablets by mouth 2 times daily as needed (constipation ) 5/21/17   Camilo Ross MD   fluticasone (VERAMYST) 27.5 MCG/SPRAY spray Spray 2 sprays into both nostrils daily    Reported, Patient   VITAMIN D, CHOLECALCIFEROL, PO Take 5,000 Units by mouth daily    Reported, Patient     Current Facility-Administered Medications Ordered in Epic   Medication Dose Route Frequency Last Rate Last Dose     lidocaine 1 % 1 mL  1 mL Other Q1H PRN   1 mL at 09/20/17 0628     lactated ringers infusion  500 mL Intravenous Continuous 25 mL/hr at 09/20/17 0628       pantoprazole (PROTONIX) EC tablet 40 mg  40 mg Oral Once per day on Mon Wed Fri   40 mg at 09/20/17 0550     ipratropium - albuterol 0.5 mg/2.5 mg/3 mL (DUONEB) neb solution 3 mL  3 mL Nebulization Q4H PRN         ipratropium - albuterol 0.5 mg/2.5 mg/3 mL (DUONEB) neb solution 3 mL  3 mL Nebulization Once per day on Mon Wed Fri   3 mL at 09/20/17 0631     albuterol (PROAIR HFA/PROVENTIL HFA/VENTOLIN HFA) Inhaler 2 puff  2 puff Inhalation Q4H PRN         fluticasone-vilanterol (BREO ELLIPTA) 200-25 MCG/INH oral inhaler 1 puff  1 puff Inhalation Daily   1 puff at 09/20/17 0819     hydrOXYzine (ATARAX) tablet 25-50 mg  25-50 mg Oral Q4H PRN   50 mg at 09/19/17 2103     ibuprofen (ADVIL/MOTRIN) tablet 600 mg  600 mg Oral Q6H PRN         magnesium hydroxide (MILK OF MAGNESIA) suspension 30 mL  30 mL Oral At Bedtime PRN         montelukast (SINGULAIR) tablet 10 mg  10 mg Oral At Bedtime          senna-docusate (SENOKOT-S;PERICOLACE) 8.6-50 MG per tablet 2 tablet  2 tablet Oral BID PRN         traZODone (DESYREL) tablet  mg   mg Oral At Bedtime PRN   100 mg at 09/19/17 2105     cholecalciferol (vitamin D3) capsule CAPS 5,000 Units  5,000 Units Oral Daily   5,000 Units at 09/20/17 0819     clonazePAM (klonoPIN) tablet 0.5 mg  0.5 mg Oral BID   0.5 mg at 09/20/17 0819     OLANZapine (zyPREXA) injection 5 mg  5 mg Intramuscular Q6H PRN        Or     OLANZapine zydis (zyPREXA) ODT tab 5 mg  5 mg Oral Q6H PRN   5 mg at 09/19/17 2105     fluticasone (FLONASE) 50 MCG/ACT spray 2 spray  2 spray Both Nostrils Daily   2 spray at 09/20/17 0819     No current [facility]-ordered outpatient prescriptions on file.     Wt Readings from Last 1 Encounters:   09/19/17 79.5 kg (175 lb 3.2 oz)     Temp Readings from Last 1 Encounters:   09/20/17 36.9  C (98.5  F) (Oral)     BP Readings from Last 6 Encounters:   09/20/17 111/65   09/16/17 120/51   07/28/17 106/74   05/21/17 102/59   10/05/11 99/53     Pulse Readings from Last 4 Encounters:   09/20/17 74   09/16/17 84   07/28/17 120   05/21/17 52     Resp Readings from Last 1 Encounters:   09/20/17 16     SpO2 Readings from Last 1 Encounters:   09/20/17 96%     Recent Labs   Lab Test  09/17/17   1134  07/17/17   0609   NA  139  142   POTASSIUM  3.8  3.6   CHLORIDE  106  107   CO2  23  24   ANIONGAP  10  11   GLC  80  97   BUN  9  13   CR  0.76  1.01   NICHOLAS  8.8  9.1     Recent Labs   Lab Test  09/17/17   1134  05/18/17   1700   AST  11  15   ALT  17  22     Recent Labs   Lab Test  09/17/17   0722  07/17/17   0609   WBC  8.1  9.2   HGB  13.3  15.5   PLT  227  229     No results for input(s): INR in the last 92145 hours.    Invalid input(s): APTT   Recent Labs   Lab Test  05/21/17   0940  05/21/17   0656   TROPI  <0.015  The 99th percentile for upper reference range is 0.045 ug/L.  Troponin values in   the range of 0.045 - 0.120 ug/L may be associated with risks of adverse    clinical events.    <0.015  The 99th percentile for upper reference range is 0.045 ug/L.  Troponin values in   the range of 0.045 - 0.120 ug/L may be associated with risks of adverse   clinical events.       RECENT LABS:   ECG:   ECHO:   CXR:    Anesthesia Evaluation     . Pt has had prior anesthetic.     No history of anesthetic complications          ROS/MED HX    ENT/Pulmonary: Comment: Previous history of aspiration pneumonia with ECT.    (+)Intermittent asthma , . .    Neurologic:       Cardiovascular:         METS/Exercise Tolerance:     Hematologic:         Musculoskeletal:         GI/Hepatic:         Renal/Genitourinary:         Endo:         Psychiatric:         Infectious Disease:         Malignancy:         Other:                     Physical Exam  Normal systems: cardiovascular, pulmonary and dental    Airway   Mallampati: I  Neck ROM: full    Dental     Cardiovascular       Pulmonary                     Anesthesia Plan      History & Physical Review  History and physical reviewed and following examination; no interval change.    ASA Status:  2 .    NPO Status:  > 8 hours    Plan for Other, General and ETT with Intravenous induction.     Additional equipment: Videolaryngoscope      Postoperative Care  Postoperative pain management:  IV analgesics.      Consents  Anesthetic plan, risks, benefits and alternatives discussed with:  Patient..                          .

## 2017-09-20 NOTE — PLAN OF CARE
"Problem: General Rehab Plan of Care  Goal: Occupational Therapy Goals  The patient and/or their representative will achieve their patient-specific goals related to the plan of care.  The patient-specific goals include:  INITIAL O.T. ASSESSMENT   Details:  Pt participated in 2/3 OT groups today. Affect was appropriate to situation, appeared anxious at times. In OT group, pt needed choices limited to three. She gathered supplies and initiated an activity independently. Behavior was organized. Attention was good during activity performance. Pt talked a bit about her two daughters. She explained that their birthdays are a week apart, and their birthday parties were a contributing factor to her admission. Affect appeared sad; pt was almost tearful as she spoke. In Life Skills group, pt was alert and attentive. Remarks during group conversation demontrated good tracking. After group, pt talked to this writer about her depression. She expressed frustration. \"I just want to be able to leave the house!\" Pt explained that she has been in and out of the hospital since last winter. She very much wants her life back and to feel like herself again. Pt acknowledged that today she was able to smile and to have a conversation. She credited ECT for her shift in mood and behavior.      "

## 2017-09-20 NOTE — ANESTHESIA POSTPROCEDURE EVALUATION
Patient: Nuha Lees    * No procedures listed *    Diagnosis:* No pre-op diagnosis entered *  Diagnosis Additional Information: No value filed.    Anesthesia Type:  Other, General, ETT    Note:  Anesthesia Post Evaluation    Patient location during evaluation: PACU  Patient participation: Able to fully participate in evaluation  Level of consciousness: awake and alert  Pain management: adequate  Airway patency: patent  Cardiovascular status: acceptable  Respiratory status: acceptable  Hydration status: acceptable  PONV: none and controlled     Anesthetic complications: None          Last vitals:  Vitals:    09/20/17 0800 09/20/17 0805 09/20/17 0812   BP: 118/71 106/69 111/65   Pulse:      Resp: 22 13 16   Temp:   36.9  C (98.5  F)   SpO2: 94% 96%          Electronically Signed By: Dinh Kinney MD  September 20, 2017  4:18 PM

## 2017-09-20 NOTE — PLAN OF CARE
Problem: Depressive Symptoms  Goal: Depressive Symptoms  Signs and symptoms of listed problems will be absent or manageable.   Outcome: Improving  ECT #1 today. Pt tolerated and states she already feels a bit better. States that she was pleased to speak with her  and  it was a good conversation. I didn t yell at him this time.  Pt showed and cleaned up. Was not very hungry for breakfast but ate most of lunch. Plesant; minimally social with peers.

## 2017-09-21 PROCEDURE — 12400006 ZZH R&B MH INTERMEDIATE

## 2017-09-21 PROCEDURE — 97150 GROUP THERAPEUTIC PROCEDURES: CPT | Mod: GO

## 2017-09-21 PROCEDURE — 25000132 ZZH RX MED GY IP 250 OP 250 PS 637: Performed by: PSYCHIATRY & NEUROLOGY

## 2017-09-21 PROCEDURE — 90853 GROUP PSYCHOTHERAPY: CPT

## 2017-09-21 RX ORDER — LIDOCAINE 50 MG/G
1 PATCH TOPICAL
Status: DISCONTINUED | OUTPATIENT
Start: 2017-09-22 | End: 2017-09-22 | Stop reason: HOSPADM

## 2017-09-21 RX ADMIN — FLUTICASONE FUROATE AND VILANTEROL TRIFENATATE 1 PUFF: 200; 25 POWDER RESPIRATORY (INHALATION) at 07:52

## 2017-09-21 RX ADMIN — FLUTICASONE PROPIONATE 2 SPRAY: 50 SPRAY, METERED NASAL at 07:52

## 2017-09-21 RX ADMIN — MONTELUKAST SODIUM 10 MG: 10 TABLET, FILM COATED ORAL at 21:01

## 2017-09-21 RX ADMIN — CHOLECALCIFEROL CAP 125 MCG (5000 UNIT) 5000 UNITS: 125 CAP at 07:52

## 2017-09-21 RX ADMIN — IBUPROFEN 600 MG: 600 TABLET ORAL at 12:47

## 2017-09-21 RX ADMIN — TRAZODONE HYDROCHLORIDE 100 MG: 50 TABLET ORAL at 21:01

## 2017-09-21 RX ADMIN — IBUPROFEN 600 MG: 600 TABLET ORAL at 07:01

## 2017-09-21 RX ADMIN — CLONAZEPAM 0.5 MG: 0.5 TABLET ORAL at 07:52

## 2017-09-21 RX ADMIN — IBUPROFEN 600 MG: 600 TABLET ORAL at 21:01

## 2017-09-21 NOTE — PLAN OF CARE
Problem: Discharge Planning  Goal: Discharge Planning (Adult, OB, Behavioral, Peds)  Patient attended Process Group on 9/20/17. Participation complete and appropriate.

## 2017-09-21 NOTE — DISCHARGE SUMMARY
"Psychiatric Discharge Summary    Nuha Lees MRN# [id number]   Age: 32 year old YOB: 1985     Date of Admission:  2017  Date of Discharge:  2017  8:10 PM  Admitting Physician:  Mindi Aguero NP   Discharge Physician:  Brad Burrell MD (Contact: Pager: [phone])         Event Leading to Hospitalization:    \"I was good for about 3 weeks and then started having up and down days and days when I didn't want to get out of bed and then I couldn't get out of bed anymore.\"       HISTORY OF PRESENT ILLNESS:  Ms. Lees provides information for this assessment.  She is a mostly reliable historian.  Intake data and records from previous hospitalizations were reviewed.       Ms. Lees has historical diagnoses of major depressive disorder, panic disorder with agoraphobia and generalized anxiety disorder.  She was most recently hospitalized at [facility] from 2017 through 2017.  During that hospitalization GeneSight testing was reviewed.  She was tapered off Effexor and Fetzima was initiated.  She underwent a course of 6 ECT treatments, the last one completed in the operating room because of her severe asthma.  She discharged to home.  No medication changes were made.  She was medication compliant following discharge.  She switched her Fetzima to bedtime dosing because she was experiencing a headache throughout the day when she took it in the daytime.  The patient reports that she has had some stressors.  Her father has CHF and diabetes.  She was on a leave of absence from work and on short-term disability; however, after this short-term disability  she was approved for SSDI.  She says that she, her , and her two children all have birthdays in mid August through mid September.  She has had some difficulty coping with this, especially as her depressive symptoms were worsening and she was spending more and more time " "in bed.  Yesterday was her 's 39th birthday party.  She was unable to get out of bed to participate.  She drove herself to [facility] Emergency Department in [place], which is the furthest that she feels comfortable driving, and she was referred for psychiatric hospitalization.       Her mood is depressed.  She has suicidal thoughts with a plan to overdose on all of her and her 's medications, drink wine and then hang herself from the bathroom basement door so that her children do not find her.  She says she has written letters and had selected 10/01/2017 after date she would follow through with this.  She has had suicidal thoughts since early August.  She has low interest, anhedonia, hypersomnia, low energy and low motivation.  Her concentration is \"horrible.\"  She has some memory impairment for memories over the last year.  She has feelings of hopelessness, helplessness, worthlessness and guilt.  She has been crying frequently.  She has had a low appetite.  She has also purposely been restricting her food intake because she is dissatisfied with her body size and weight.  She has panic attacks about twice per week.  She is not sure what triggers them.  They are characterized by feeling clammy, sweaty, short of breath and racing heart.  She rarely leaves the home and yard.  She has social anxiety, fears that others are watching her and judging her.  She reports that she likes to do everything in threes,  for example tapping things 3 times and cutting her food 3 times.  This has been less problematic since her most recent hospitalization.  She reports that she hears a voice inside of her head saying \"just do it, just get it over with\" pertaining to suicide.  She says that for the first time ever, she had some visual hallucinations last week when she went to take her dog into the yard; she saw trees which appeared to be like neon tombs.  When she was in the Emergency Department yesterday she saw bugs on " "the floor.  She denies PTSD symptoms.  She denies homicidal ideation.  She denies gambling.       PAST PSYCHIATRIC HISTORY:  The patient reports that she has been depressed and anxious since childhood; however, previous records indicate generalized anxiety symptoms since puberty and that depression started in her early 20s.  This is her sixth psychiatric hospitalization.  She was hospitalized in [place] in 04/2017.  She had another hospitalization in [place], one in [place] and also psychiatric hospitalizations at [facility] in 05/2017 and 07/2017.  In 05/2017, she had 1 attempt at ECT but had an asthma attack during anesthesia before the ECT was actually completed, was transferred to Medicine and then went home.  She had a course of 6 ECT treatments in 07/2017, the last of which was done in the OR due to the severity of her asthma.  She started psychotherapy 11 years ago and has seen therapists on and off since then.  Her current therapist is Zuleyma Rincon in [place].  Past medications include Wellbutrin, Celexa, Cymbalta, Trintellix, Effexor, Prozac, Lexapro, melatonin, Unisom, Abilify and Paxil.  She has a history of suicide attempts by cutting her ankles.  There is a question about a possible bipolar diagnosis.  Twice in her life she had an elevated mood with a lot of energy that only lasted a couple hours.  She also states that some days following ECT last July she had a lot more energy and felt \"almost manic.\"       SUBSTANCE USE HISTORY:  She smokes marijuana daily.  She denies any other drug use.  She denies problematic drinking.  She smokes cigarettes.       See Admission note by Mindi Aguero NP for additional details.          DIagnoses:     1. Major depressive disorder, recurrent, severe  2. Anxiety, NOS  3. Panic disorder  4. Cannabis dependence  5. Tobacco use disorder  6. Borderline personality disorder         Labs:     Results for BRIANNA LAYTON " (MRN [id number]) as of 9/21/2017 11:26   Ref. Range 9/17/2017 07:22 9/17/2017 10:22 9/17/2017 11:34   Sodium Latest Ref Range: 133 - 144 mmol/L   139   Potassium Latest Ref Range: 3.4 - 5.3 mmol/L   3.8   Chloride Latest Ref Range: 94 - 109 mmol/L   106   Carbon Dioxide Latest Ref Range: 20 - 32 mmol/L   23   Urea Nitrogen Latest Ref Range: 7 - 30 mg/dL   9   Creatinine Latest Ref Range: 0.52 - 1.04 mg/dL   0.76   GFR Estimate Latest Ref Range: >60 mL/min/1.7m2   88   GFR Estimate If Black Latest Ref Range: >60 mL/min/1.7m2   >90   Calcium Latest Ref Range: 8.5 - 10.1 mg/dL   8.8   Anion Gap Latest Ref Range: 3 - 14 mmol/L   10   Albumin Latest Ref Range: 3.4 - 5.0 g/dL   4.0   Protein Total Latest Ref Range: 6.8 - 8.8 g/dL   7.3   Bilirubin Total Latest Ref Range: 0.2 - 1.3 mg/dL   0.8   Alkaline Phosphatase Latest Ref Range: 40 - 150 U/L   54   ALT Latest Ref Range: 0 - 50 U/L   17   AST Latest Ref Range: 0 - 45 U/L   11   Cholesterol Latest Ref Range: <200 mg/dL   219 (H)   HDL Cholesterol Latest Ref Range: >49 mg/dL   50   LDL Cholesterol Calculated Latest Ref Range: <100 mg/dL   142 (H)   Non HDL Cholesterol Latest Ref Range: <130 mg/dL   169 (H)   Triglycerides Latest Ref Range: <150 mg/dL   134   TSH Latest Ref Range: 0.40 - 4.00 mU/L   1.42   Glucose Latest Ref Range: 70 - 99 mg/dL   80   WBC Latest Ref Range: 4.0 - 11.0 10e9/L 8.1     Hemoglobin Latest Ref Range: 11.7 - 15.7 g/dL 13.3     Hematocrit Latest Ref Range: 35.0 - 47.0 % 39.6     Platelet Count Latest Ref Range: 150 - 450 10e9/L 227     RBC Count Latest Ref Range: 3.8 - 5.2 10e12/L 4.50     MCV Latest Ref Range: 78 - 100 fl 88     MCH Latest Ref Range: 26.5 - 33.0 pg 29.6     MCHC Latest Ref Range: 31.5 - 36.5 g/dL 33.6     RDW Latest Ref Range: 10.0 - 15.0 % 13.0     Diff Method Unknown Automated Method     % Neutrophils Latest Units: % 58.6     % Lymphocytes Latest Units: % 29.9     % Monocytes Latest Units: % 7.1     % Eosinophils Latest Units:  % 3.8     % Basophils Latest Units: % 0.4     % Immature Granulocytes Latest Units: % 0.2     Nucleated RBCs Latest Ref Range: 0 /100 0     Absolute Neutrophil Latest Ref Range: 1.6 - 8.3 10e9/L 4.8     Absolute Lymphocytes Latest Ref Range: 0.8 - 5.3 10e9/L 2.4     Absolute Monocytes Latest Ref Range: 0.0 - 1.3 10e9/L 0.6     Absolute Eosinophils Latest Ref Range: 0.0 - 0.7 10e9/L 0.3     Absolute Basophils Latest Ref Range: 0.0 - 0.2 10e9/L 0.0     Abs Immature Granulocytes Latest Ref Range: 0 - 0.4 10e9/L 0.0     Absolute Nucleated RBC Unknown 0.0     EKG 12-LEAD, TRACING ONLY Unknown  Rpt             Consults:   Consultation during this admission received from internal medicine for physical prior to ECT and Danilo Patel MD also for ECT.          Hospital Course:   Nuha Lees was admitted to Station 20 with attending Brad Burrell MD as a voluntary patient. The patient was placed under status 15 (15 minute checks) to ensure patient safety. She was continued on her home medications, Fetzima was changed from morning to bedtime. Patient presented as severely depressed, tearful, with minimal eye contact. She reported persistent Suicidal ideation. Nuha indicated that she would like to do ECT again. Consults were requested from  and Danilo Patel MD. Due to history of patient having severe asthma attack during one of ECT treatments Danilo Patel MD suggested to transfer patient to [facility] inpatient psychiatric unit. This plan was discussed with the patient and she agreed with it. She was seen by this provider one time only on the day of her transfer to [facility].     Nuha Lees did participate in groups and was visible in the milieu.     The patient's symptoms of depression improved.     Nuha Lees was transferred to [facility] psychiatry. At the time of transfer Nuha Lees was determined to not be a danger to others.          Discharge  Medications:     Discharge Medication List as of 9/18/2017  6:00 PM      CONTINUE these medications which have NOT CHANGED    Details   levomilnacipran (FETZIMA ER) 80 MG 24 hr capsule Take 1 capsule (80 mg) by mouth daily, 80 mg, Oral, DAILY Starting 7/27/2017, Until Discontinued, Disp-30 capsule, R-0, E-Prescribe      hydrOXYzine (ATARAX) 25 MG tablet Take 1-2 tablets (25-50 mg) by mouth every 4 hours as needed for anxiety, Disp-30 tablet, R-0, E-Prescribe      albuterol (PROAIR HFA/PROVENTIL HFA/VENTOLIN HFA) 108 (90 BASE) MCG/ACT Inhaler Inhale 2 puffs into the lungs every 4 hours as needed for shortness of breath / dyspnea or wheezing, Historical      fluticasone-vilanterol (BREO ELLIPTA) 200-25 MCG/INH oral inhaler Inhale 1 puff into the lungs daily, Disp-1 Inhaler, R-0, E-Prescribe      montelukast (SINGULAIR) 10 MG tablet Take 10 mg by mouth At Bedtime, Historical      CLONAZEPAM PO Take 0.5 mg by mouth 3 times daily, Historical      VITAMIN D, CHOLECALCIFEROL, PO Take 5,000 Units by mouth daily, Historical      ipratropium - albuterol 0.5 mg/2.5 mg/3 mL (DUONEB) 0.5-2.5 (3) MG/3ML neb solution Take 1 vial (3 mLs) by nebulization every 4 hours as needed for shortness of breath / dyspnea or wheezing, Disp-360 mL, Historical      traZODone (DESYREL) 50 MG tablet Take 1 tablet (50 mg) by mouth nightly as needed for sleep, Disp-15 tablet, R-0, E-Prescribe      senna-docusate (SENOKOT-S;PERICOLACE) 8.6-50 MG per tablet Take 2 tablets by mouth 2 times daily as needed (constipation ), Disp-20 tablet, R-0, E-Prescribe      pantoprazole (PROTONIX) 40 MG EC tablet Take 1 tablet (40 mg) by mouth every morning (before breakfast), Disp-15 tablet, R-0, E-Prescribe      nicotine (NICODERM CQ) 14 MG/24HR 24 hr patch Place 1 patch onto the skin daily, Disp-14 patch, R-0, E-Prescribe      polyethylene glycol (MIRALAX/GLYCOLAX) Packet Take 17 g by mouth daily as needed for constipation, Disp-7 packet, R-0, E-Prescribe       fluticasone (VERAMYST) 27.5 MCG/SPRAY spray Spray 2 sprays into both nostrils daily, Historical                  Psychiatric Examination:   Appearance:  awake, alert and adequately groomed  Attitude:  cooperative  Eye Contact:  poor   Mood:  anxious and depressed  Affect:  constricted mobility  Speech:  clear, coherent  Psychomotor Behavior:  no evidence of tardive dyskinesia, dystonia, or tics and physical retardation  Thought Process:  linear and goal oriented  Associations:  no loose associations  Thought Content:  active suicidal ideation present  Insight:  partial  Judgment:  fair  Oriented to:  time, person, and place  Attention Span and Concentration:  intact  Recent and Remote Memory:  fair  Language: Able to name objects, Able to repeat phrases and Able to read and write  Fund of Knowledge: appropriate  Muscle Strength and Tone: normal  Gait and Station: Normal         Discharge Plan:     Health Care Follow-up Appointments:     [place] [address] [phone] for ECT treatments.     Psychiatry: Dr. Lemus at [facility] Appointment set in November [id number], [place] [address]  Phone number: [phone]  Fax: [phone]   Therapy: Zuleyma Rincon, [address] [place] [address] [address] [address], [phone] (weekly appointments, Wednesday's at 11:00)  PCP: Martha Denise NP at [facility], [address] [place] [address], [phone]  Attestation:  The patient has been seen and evaluated by me,  Brad Burrell MD on 9/18/2017 between 9/21/2017 between 11:00 and 11:40.

## 2017-09-21 NOTE — PLAN OF CARE
Problem: Discharge Planning  Goal: Discharge Planning (Adult, OB, Behavioral, Peds)  Patient attended Process Group and participated appropriately. Patient demonstrated excellent insight into the topic of discussion.

## 2017-09-21 NOTE — PLAN OF CARE
Problem: Depressive Symptoms  Goal: Depressive Symptoms  Signs and symptoms of listed problems will be absent or manageable.   Outcome: Improving  Full range affect, pleasant, social with staff and peers. Visible all shift in lounge or hallway. Attended groups. Pt states that she is feeling a lot better, less depressed and has a better appetite. Still reporting some memory loss and some body aches from ECT yesterday

## 2017-09-21 NOTE — PROGRESS NOTES
[facility] Psychiatric H&P Note       Initial History   The patient's care was discussed with the treatment team and chart notes were reviewed.     Patient examined for psychiatric admission.         INTERIM HISTORY:    Pt had first ECT without complications and pt now resting quietly back on station 77.  Spoke with Dr Lemus about medication options and because of pt's severe asthma will no attempt Emsam. Will continue current medications (Fetzima already stopped by pt prior to admit for more than a week.)        Hospital Course     Pt was transferred to [facility] for ECT in the OR due to concerns about complications related to her asthma. On 9/19 She presents with severe depression, and borderline traits. Discontinued Fetzima and arranged to begin a course of 6 ECT treatments tomorrow 9/20.      Medications     Prescriptions Prior to Admission   Medication Sig Dispense Refill Last Dose     levomilnacipran (FETZIMA ER) 80 MG 24 hr capsule Take 1 capsule (80 mg) by mouth daily 30 capsule 0 Past Week at Unknown time     hydrOXYzine (ATARAX) 25 MG tablet Take 1-2 tablets (25-50 mg) by mouth every 4 hours as needed for anxiety 30 tablet 0 9/18/2017 at Unknown time     traZODone (DESYREL) 50 MG tablet Take 1 tablet (50 mg) by mouth nightly as needed for sleep (Patient taking differently: Take  mg by mouth nightly as needed for sleep ) 15 tablet 0 Past Week at Unknown time     pantoprazole (PROTONIX) 40 MG EC tablet Take 1 tablet (40 mg) by mouth every morning (before breakfast) 15 tablet 0 Past Week at Unknown time     nicotine (NICODERM CQ) 14 MG/24HR 24 hr patch Place 1 patch onto the skin daily 14 patch 0 Past Week at Unknown time     montelukast (SINGULAIR) 10 MG tablet Take 10 mg by mouth At Bedtime   Past Week at Unknown time     CLONAZEPAM PO Take 0.5 mg by mouth 3 times daily   9/18/2017 at Unknown time     Magnesium Hydroxide (MILK OF MAGNESIA PO) Take 30 mLs by mouth nightly as needed   Unknown  at Unknown time     IBUPROFEN PO Take 600 mg by mouth every 6 hours as needed for moderate pain   Unknown at Unknown time     albuterol (PROAIR HFA/PROVENTIL HFA/VENTOLIN HFA) 108 (90 BASE) MCG/ACT Inhaler Inhale 2 puffs into the lungs every 4 hours as needed for shortness of breath / dyspnea or wheezing   9/15/2017 at Unknown time     fluticasone-vilanterol (BREO ELLIPTA) 200-25 MCG/INH oral inhaler Inhale 1 puff into the lungs daily 1 Inhaler 0 Unknown at Unknown time     ipratropium - albuterol 0.5 mg/2.5 mg/3 mL (DUONEB) 0.5-2.5 (3) MG/3ML neb solution Take 1 vial (3 mLs) by nebulization every 4 hours as needed for shortness of breath / dyspnea or wheezing 360 mL  Unknown at Unknown time     senna-docusate (SENOKOT-S;PERICOLACE) 8.6-50 MG per tablet Take 2 tablets by mouth 2 times daily as needed (constipation ) 20 tablet 0 Unknown at Unknown time     fluticasone (VERAMYST) 27.5 MCG/SPRAY spray Spray 2 sprays into both nostrils daily   Unknown at Unknown time     VITAMIN D, CHOLECALCIFEROL, PO Take 5,000 Units by mouth daily   Unknown at Unknown time       Scheduled Medications:    pantoprazole  40 mg Oral Once per day on Mon Wed Fri     ipratropium - albuterol 0.5 mg/2.5 mg/3 mL  3 mL Nebulization Once per day on Mon Wed Fri     fluticasone-vilanterol  1 puff Inhalation Daily     montelukast  10 mg Oral At Bedtime     cholecalciferol  5,000 Units Oral Daily     clonazePAM  0.5 mg Oral BID     fluticasone  2 spray Both Nostrils Daily     PRNs:  lidocaine (buffered or not buffered), ipratropium - albuterol 0.5 mg/2.5 mg/3 mL, albuterol, hydrOXYzine, ibuprofen (ADVIL/MOTRIN) tablet 600 mg, magnesium hydroxide, senna-docusate, traZODone, OLANZapine **OR** OLANZapine zydis      Allergies      Allergies   Allergen Reactions     Codeine Sulfate      Patient reported tolerated Ultram in the past     Sulfa Drugs      Trintellix [Vortioxetine]         Previous Medical History     Past Medical History:   Diagnosis Date  "    Asthma         Medical Review of Systems   BP (!) 126/96  Pulse 74  Temp 98.4  F (36.9  C) (Oral)  Resp 17  Ht 1.626 m (5' 4\")  Wt 79.5 kg (175 lb 3.2 oz)  SpO2 96%  BMI 30.07 kg/m2  Body mass index is 30.07 kg/(m^2).  Previous 10-point ROS completed by NATHAN Campos CNP on 9/17/2017 reviewed by Danilo Leon MD on September 19, 2017 and is unchanged except for those problems mentioned within the HPI.     Mental Status Examination     Appearance Sitting in chair, dressed in scrubs. Appears stated age.   Attitude Cooperative   Orientation Oriented to person, place, time   Eye Contact Poor   Speech normal   Language Normal   Psychomotor Behavior Normal   Mood Depressed, anxious   Affect Much lessTearful   Thought Process Goal-Oriented, Intact   Associations Intact   Thought Content Patient is currently negative for suicide ideation, negative for plan or intent, able to contract no self harm and identify barriers to suicide.  Negative for obsessions, compulsions or psychosis.      Fund of Knowledge Limited   Insight Very poor   Judgement Impaired   Attention Span & Concentration Alert   Recent & Remote Memory Intact   Gait Normal   Muscle Tone Intact      Labs   Labs reviewed.  No results found for this or any previous visit (from the past 24 hour(s)).       Impression     Ms. Nuha Lees is a 32-year-old female with a history of depression, anxiety, panic disorder, and Cannabis dependence who was admitted to [address] and transferred to [facility]  for ECT, as ECT at [facility] is performed in the OR and there are concerns about complications related to Pt's asthma. Pt presents with severe depression and borderline traits. Discontinued Fetzima due to a history of side effects, including headaches and high blood pressure, and lack of efficacy. Arranged to commence a course of 6 ECTs starting tomorrow 9/20/2017 for treatment resistant depression.      Diagnoses "   1. Major depressive disorder, recurrent, severe  2. Anxiety, NOS  3. Panic disorder  4. Cannabis dependence  5. Tobacco use disorder  6. Borderline personality disorder     Plan     1. Explained side effects, benefits, and complications of medications to the patient, Pt gave verbal consent.  2. Medication changes: continue medications  3. Discussed treatment plan with patient and team.  4. Projected length of stay: until Pt is stabilized  5. Ordered Pulmonology consult due to severe asthma and asthma attack during ECT  6. Continue a course of 6 ECTs and plan on possible maintenance after completed.       Attestation:   Patient has been seen and evaluated by me, Danilo Leon MD.    Patient ID:  Name: Nuha Lees    MRN: [id number]  Admission: 9/18/2017     YOB: 1985

## 2017-09-21 NOTE — PLAN OF CARE
Problem: Depressive Symptoms  Goal: Depressive Symptoms  Signs and symptoms of listed problems will be absent or manageable.      Pt seems to have greatly improved since yesterday. Presents with a full range affect and brightens upon approach. Ate all of her meals, attended groups, and was present, pleasant, and social on the unit. Reports to be experiencing memory loss from the ECT procedure but no other side effects.

## 2017-09-21 NOTE — PROGRESS NOTES
[facility] Psychiatric Progress Note       Interim History   The patient's care was discussed with the treatment team and chart notes were reviewed. Pt seen on SDU. Tolerating medications without side effects. Side effects, risks, and benefits of medications reviewed with patient. Patient is currently negative for suicide ideation, negative for plan or intent, able to contract no self harm and identify barriers to suicide.  Negative for obsessions, compulsions or psychosis.   Pt is dramatically improved after her first ECT. She is much less hopeless and overwhelmed, her affect has a broad range and she is no longer depressed. Pt asked whether she needs the full course of 6 ECTs considering her excellent response after her first. Plan on ECT tomorrow, but then maintenance ECT 1 month after that. Her outpatient Psychiatrist is Dr. Lemus, who is currently on leave from work due to an injury, so follow up with him is unclear. Likely discharge tomorrow.      Hospital Course     Pt was transferred to [facility] for ECT in the OR due to concerns about complications related to her asthma. On 9/19 She presents with severe depression, and borderline traits. Discontinued Fetzima and arranged to begin a course of 6 ECT treatments tomorrow 9/20. On 9/21 Pt had improved dramatically. Determined to have one more ECT on 9/22, then maintenance ECT 1 month after that.      Medications     Current Facility-Administered Medications Ordered in Epic   Medication Dose Route Frequency Last Rate Last Dose     lidocaine 1 % 1 mL  1 mL Other Q1H PRN   1 mL at 09/20/17 0628     lactated ringers infusion  500 mL Intravenous Continuous 25 mL/hr at 09/20/17 0628       pantoprazole (PROTONIX) EC tablet 40 mg  40 mg Oral Once per day on Mon Wed Fri   40 mg at 09/20/17 0550     ipratropium - albuterol 0.5 mg/2.5 mg/3 mL (DUONEB) neb solution 3 mL  3 mL Nebulization Q4H PRN         ipratropium - albuterol 0.5 mg/2.5 mg/3 mL (DUONEB) neb  "solution 3 mL  3 mL Nebulization Once per day on Mon Wed Fri   3 mL at 09/20/17 0631     albuterol (PROAIR HFA/PROVENTIL HFA/VENTOLIN HFA) Inhaler 2 puff  2 puff Inhalation Q4H PRN         fluticasone-vilanterol (BREO ELLIPTA) 200-25 MCG/INH oral inhaler 1 puff  1 puff Inhalation Daily   1 puff at 09/21/17 0752     hydrOXYzine (ATARAX) tablet 25-50 mg  25-50 mg Oral Q4H PRN   50 mg at 09/19/17 2103     ibuprofen (ADVIL/MOTRIN) tablet 600 mg  600 mg Oral Q6H PRN   600 mg at 09/21/17 1247     magnesium hydroxide (MILK OF MAGNESIA) suspension 30 mL  30 mL Oral At Bedtime PRN         montelukast (SINGULAIR) tablet 10 mg  10 mg Oral At Bedtime   10 mg at 09/20/17 2132     senna-docusate (SENOKOT-S;PERICOLACE) 8.6-50 MG per tablet 2 tablet  2 tablet Oral BID PRN         traZODone (DESYREL) tablet  mg   mg Oral At Bedtime PRN   50 mg at 09/20/17 2135     cholecalciferol (vitamin D3) capsule CAPS 5,000 Units  5,000 Units Oral Daily   5,000 Units at 09/21/17 0752     clonazePAM (klonoPIN) tablet 0.5 mg  0.5 mg Oral BID   0.5 mg at 09/21/17 0752     OLANZapine (zyPREXA) injection 5 mg  5 mg Intramuscular Q6H PRN        Or     OLANZapine zydis (zyPREXA) ODT tab 5 mg  5 mg Oral Q6H PRN   5 mg at 09/19/17 2105     fluticasone (FLONASE) 50 MCG/ACT spray 2 spray  2 spray Both Nostrils Daily   2 spray at 09/21/17 0752     No current Epic-ordered outpatient prescriptions on file.         Allergies      Allergies   Allergen Reactions     Codeine Sulfate      Patient reported tolerated Ultram in the past     Sulfa Drugs      Trintellix [Vortioxetine]         Medical Review of Systems   /57  Pulse 64  Temp 98.1  F (36.7  C) (Oral)  Resp 16  Ht 1.626 m (5' 4\")  Wt 79.5 kg (175 lb 3.2 oz)  SpO2 96%  BMI 30.07 kg/m2  Body mass index is 30.07 kg/(m^2).  A 10-point review of systems was performed by Danilo Leon MD and is negative, no new findings.      Psychiatric Examination     Appearance Sitting in " chair, dressed in scrubs. Appears stated age.   Attitude Cooperative   Orientation Oriented to person, place, time   Eye Contact Poor   Speech Regular rate, rhythm, volume and tone   Language Normal   Psychomotor Behavior Normal   Mood Optimistic   Affect Broader range   Thought Process Goal-Oriented, Intact   Associations Intact   Thought Content Patient is currently negative for suicide ideation, negative for plan or intent, able to contract no self harm and identify barriers to suicide.  Negative for obsessions, compulsions or psychosis.      Fund of Knowledge Adequate   Insight Limited   Judgement Improving   Attention Span & Concentration Alert   Recent & Remote Memory Intact   Gait Normal   Muscle Tone Intact        Labs   Labs reviewed.  No results found for this or any previous visit (from the past 24 hour(s)).     Impression     Ms. Nuha Lees is a 32-year-old female with a history of depression, anxiety, panic disorder, and Cannabis dependence who was admitted to [address] and transferred to [place] [address] for ECT, as ECT at [facility] is performed in the OR and there are concerns about complications related to Pt's asthma. Pt presented with severe depression and borderline traits. Discontinued Fetzima due to a history of side effects, including headaches and high blood pressure, and lack of efficacy. Pt completed her first ECT on 9/20 and responded remarkably well. She is much less depressed, and has a broader and brighter affect. Plan to have one more ECT treatment tomorrow, then schedule maintenance ECT in 1 month. Likely discharge tomorrow.      Diagnoses   1. Major depressive disorder, recurrent, severe  2. Anxiety, NOS  3. Panic disorder  4. Cannabis use disorder  5. Tobacco use disorder  6. Borderline personality disorder     Plan     1. Explained side effects, benefits, and complications of medications to the patient, Pt gave verbal consent.  2. Medication changes:  continue medications  3. Discussed treatment plan with patient and team.  4. Projected length of stay: likely discharge tomorrow  5. Pt to complete one more ECT tomorrow morning  6. Schedule outpatient maintenance ECT in 1 month  7. Follow up with Dr. Lemus    Attestation:   Patient has been seen and evaluated by me, Danilo Leon MD.    Patient ID:  Name: Nuha Lees    MRN: [id number]  Admission: 9/18/2017     YOB: 1985

## 2017-09-22 ENCOUNTER — ANESTHESIA EVENT (OUTPATIENT)
Dept: SURGERY | Facility: CLINIC | Age: 32
End: 2017-09-22

## 2017-09-22 ENCOUNTER — ANESTHESIA (OUTPATIENT)
Dept: SURGERY | Facility: CLINIC | Age: 32
End: 2017-09-22

## 2017-09-22 VITALS
RESPIRATION RATE: 16 BRPM | OXYGEN SATURATION: 95 % | SYSTOLIC BLOOD PRESSURE: 102 MMHG | DIASTOLIC BLOOD PRESSURE: 62 MMHG | HEIGHT: 64 IN | BODY MASS INDEX: 29.91 KG/M2 | WEIGHT: 175.2 LBS | HEART RATE: 64 BPM | TEMPERATURE: 97.9 F

## 2017-09-22 PROCEDURE — 25000566 ZZH SEVOFLURANE, EA 15 MIN

## 2017-09-22 PROCEDURE — 40000671 ZZH STATISTIC ANESTHESIA CASE

## 2017-09-22 PROCEDURE — GZB2ZZZ ELECTROCONVULSIVE THERAPY, BILATERAL-SINGLE SEIZURE: ICD-10-PCS | Performed by: PSYCHIATRY & NEUROLOGY

## 2017-09-22 PROCEDURE — 25000125 ZZHC RX 250: Performed by: NURSE ANESTHETIST, CERTIFIED REGISTERED

## 2017-09-22 PROCEDURE — 25000128 H RX IP 250 OP 636: Performed by: ANESTHESIOLOGY

## 2017-09-22 PROCEDURE — 90870 ELECTROCONVULSIVE THERAPY: CPT

## 2017-09-22 PROCEDURE — 25000128 H RX IP 250 OP 636: Performed by: NURSE ANESTHETIST, CERTIFIED REGISTERED

## 2017-09-22 PROCEDURE — 25000132 ZZH RX MED GY IP 250 OP 250 PS 637: Performed by: PSYCHIATRY & NEUROLOGY

## 2017-09-22 PROCEDURE — 25000125 ZZHC RX 250: Performed by: INTERNAL MEDICINE

## 2017-09-22 PROCEDURE — 25000128 H RX IP 250 OP 636: Performed by: PSYCHIATRY & NEUROLOGY

## 2017-09-22 RX ORDER — ALBUTEROL SULFATE 0.83 MG/ML
2.5 SOLUTION RESPIRATORY (INHALATION) EVERY 4 HOURS PRN
Status: DISCONTINUED | OUTPATIENT
Start: 2017-09-22 | End: 2017-09-22 | Stop reason: HOSPADM

## 2017-09-22 RX ORDER — ONDANSETRON 4 MG/1
4 TABLET, ORALLY DISINTEGRATING ORAL EVERY 30 MIN PRN
Status: DISCONTINUED | OUTPATIENT
Start: 2017-09-22 | End: 2017-09-22 | Stop reason: HOSPADM

## 2017-09-22 RX ORDER — ONDANSETRON 2 MG/ML
4 INJECTION INTRAMUSCULAR; INTRAVENOUS ONCE
Status: COMPLETED | OUTPATIENT
Start: 2017-09-22 | End: 2017-09-22

## 2017-09-22 RX ORDER — VILAZODONE HYDROCHLORIDE 10 MG/1
10 TABLET ORAL ONCE
Status: COMPLETED | OUTPATIENT
Start: 2017-09-22 | End: 2017-09-22

## 2017-09-22 RX ORDER — FENTANYL CITRATE 50 UG/ML
25-50 INJECTION, SOLUTION INTRAMUSCULAR; INTRAVENOUS
Status: DISCONTINUED | OUTPATIENT
Start: 2017-09-22 | End: 2017-09-22 | Stop reason: HOSPADM

## 2017-09-22 RX ORDER — LIDOCAINE 50 MG/G
1 PATCH TOPICAL
Status: CANCELLED | OUTPATIENT
Start: 2017-09-23

## 2017-09-22 RX ORDER — LABETALOL HYDROCHLORIDE 5 MG/ML
10 INJECTION, SOLUTION INTRAVENOUS
Status: DISCONTINUED | OUTPATIENT
Start: 2017-09-22 | End: 2017-09-22 | Stop reason: HOSPADM

## 2017-09-22 RX ORDER — HYDRALAZINE HYDROCHLORIDE 20 MG/ML
2.5-5 INJECTION INTRAMUSCULAR; INTRAVENOUS EVERY 10 MIN PRN
Status: DISCONTINUED | OUTPATIENT
Start: 2017-09-22 | End: 2017-09-22 | Stop reason: HOSPADM

## 2017-09-22 RX ORDER — VILAZODONE HYDROCHLORIDE 20 MG/1
20 TABLET ORAL DAILY
Status: DISCONTINUED | OUTPATIENT
Start: 2017-09-23 | End: 2017-09-22 | Stop reason: HOSPADM

## 2017-09-22 RX ORDER — SODIUM CHLORIDE, SODIUM LACTATE, POTASSIUM CHLORIDE, CALCIUM CHLORIDE 600; 310; 30; 20 MG/100ML; MG/100ML; MG/100ML; MG/100ML
INJECTION, SOLUTION INTRAVENOUS CONTINUOUS
Status: DISCONTINUED | OUTPATIENT
Start: 2017-09-22 | End: 2017-09-22 | Stop reason: HOSPADM

## 2017-09-22 RX ORDER — SODIUM CHLORIDE, SODIUM LACTATE, POTASSIUM CHLORIDE, CALCIUM CHLORIDE 600; 310; 30; 20 MG/100ML; MG/100ML; MG/100ML; MG/100ML
INJECTION, SOLUTION INTRAVENOUS CONTINUOUS PRN
Status: DISCONTINUED | OUTPATIENT
Start: 2017-09-22 | End: 2017-09-22

## 2017-09-22 RX ORDER — VILAZODONE HYDROCHLORIDE 10 MG/1
10 TABLET ORAL DAILY
Status: DISCONTINUED | OUTPATIENT
Start: 2017-09-22 | End: 2017-09-22

## 2017-09-22 RX ORDER — LIDOCAINE 50 MG/G
1 PATCH TOPICAL
Status: DISCONTINUED | OUTPATIENT
Start: 2017-09-23 | End: 2017-09-22 | Stop reason: HOSPADM

## 2017-09-22 RX ORDER — ONDANSETRON 2 MG/ML
4 INJECTION INTRAMUSCULAR; INTRAVENOUS EVERY 30 MIN PRN
Status: DISCONTINUED | OUTPATIENT
Start: 2017-09-22 | End: 2017-09-22 | Stop reason: HOSPADM

## 2017-09-22 RX ORDER — MEPERIDINE HYDROCHLORIDE 25 MG/ML
12.5 INJECTION INTRAMUSCULAR; INTRAVENOUS; SUBCUTANEOUS EVERY 5 MIN PRN
Status: DISCONTINUED | OUTPATIENT
Start: 2017-09-22 | End: 2017-09-22 | Stop reason: HOSPADM

## 2017-09-22 RX ORDER — HYDROMORPHONE HYDROCHLORIDE 1 MG/ML
.3-.5 INJECTION, SOLUTION INTRAMUSCULAR; INTRAVENOUS; SUBCUTANEOUS EVERY 5 MIN PRN
Status: DISCONTINUED | OUTPATIENT
Start: 2017-09-22 | End: 2017-09-22 | Stop reason: HOSPADM

## 2017-09-22 RX ORDER — VILAZODONE HYDROCHLORIDE 20 MG/1
20 TABLET ORAL DAILY
Qty: 30 TABLET | Refills: 0 | Status: SHIPPED | OUTPATIENT
Start: 2017-09-23 | End: 2017-10-23

## 2017-09-22 RX ADMIN — CLONAZEPAM 0.5 MG: 0.5 TABLET ORAL at 11:20

## 2017-09-22 RX ADMIN — SUCCINYLCHOLINE CHLORIDE 100 MG: 20 INJECTION, SOLUTION INTRAMUSCULAR; INTRAVENOUS at 07:46

## 2017-09-22 RX ADMIN — CHOLECALCIFEROL CAP 125 MCG (5000 UNIT) 5000 UNITS: 125 CAP at 11:20

## 2017-09-22 RX ADMIN — LIDOCAINE HYDROCHLORIDE 1 ML: 10 INJECTION, SOLUTION EPIDURAL; INFILTRATION; INTRACAUDAL; PERINEURAL at 06:33

## 2017-09-22 RX ADMIN — IBUPROFEN 600 MG: 600 TABLET ORAL at 11:22

## 2017-09-22 RX ADMIN — SODIUM CHLORIDE, SODIUM LACTATE, POTASSIUM CHLORIDE, CALCIUM CHLORIDE 500 ML: 600; 310; 30; 20 INJECTION, SOLUTION INTRAVENOUS at 06:33

## 2017-09-22 RX ADMIN — IPRATROPIUM BROMIDE AND ALBUTEROL SULFATE 3 ML: .5; 3 SOLUTION RESPIRATORY (INHALATION) at 06:33

## 2017-09-22 RX ADMIN — FLUTICASONE FUROATE AND VILANTEROL TRIFENATATE 1 PUFF: 200; 25 POWDER RESPIRATORY (INHALATION) at 11:19

## 2017-09-22 RX ADMIN — PANTOPRAZOLE SODIUM 40 MG: 40 TABLET, DELAYED RELEASE ORAL at 05:46

## 2017-09-22 RX ADMIN — ONDANSETRON 4 MG: 2 SOLUTION INTRAMUSCULAR; INTRAVENOUS at 06:46

## 2017-09-22 RX ADMIN — VILAZODONE HYDROCHLORIDE 10 MG: 10 TABLET ORAL at 11:19

## 2017-09-22 RX ADMIN — IPRATROPIUM BROMIDE AND ALBUTEROL SULFATE 3 ML: .5; 3 SOLUTION RESPIRATORY (INHALATION) at 08:02

## 2017-09-22 RX ADMIN — SODIUM CHLORIDE, POTASSIUM CHLORIDE, SODIUM LACTATE AND CALCIUM CHLORIDE: 600; 310; 30; 20 INJECTION, SOLUTION INTRAVENOUS at 07:41

## 2017-09-22 RX ADMIN — METHOHEXITAL SODIUM 100 MG: 500 INJECTION, POWDER, LYOPHILIZED, FOR SOLUTION INTRAMUSCULAR; INTRAVENOUS; RECTAL at 07:46

## 2017-09-22 RX ADMIN — FLUTICASONE PROPIONATE 2 SPRAY: 50 SPRAY, METERED NASAL at 11:19

## 2017-09-22 NOTE — ANESTHESIA PREPROCEDURE EVALUATION
Procedure: * No procedures listed *  Preop diagnosis: * No pre-op diagnosis entered *    Allergies   Allergen Reactions     Codeine Sulfate      Patient reported tolerated Ultram in the past     Sulfa Drugs      Trintellix [Vortioxetine]      Past Medical History:   Diagnosis Date     Asthma      Past Surgical History:   Procedure Laterality Date     KNEE SURGERY Right     Roller derby injury      REPAIR PTOSIS  10/5/2011    Procedure:REPAIR PTOSIS; Left Upper Lid Ptosis Repair; Surgeon:MONICA ANDREWS; Location: [facility]     TUBAL LIGATION  2016     Prior to Admission medications    Medication Sig Start Date End Date Taking? Authorizing Provider   levomilnacipran (FETZIMA ER) 80 MG 24 hr capsule Take 1 capsule (80 mg) by mouth daily 7/27/17  Yes Heber Lemus MD   hydrOXYzine (ATARAX) 25 MG tablet Take 1-2 tablets (25-50 mg) by mouth every 4 hours as needed for anxiety 5/21/17  Yes Camilo Ross MD   traZODone (DESYREL) 50 MG tablet Take 1 tablet (50 mg) by mouth nightly as needed for sleep  Patient taking differently: Take  mg by mouth nightly as needed for sleep  5/21/17  Yes Camilo Ross MD   pantoprazole (PROTONIX) 40 MG EC tablet Take 1 tablet (40 mg) by mouth every morning (before breakfast) 5/21/17  Yes Camilo Ross MD   nicotine (NICODERM CQ) 14 MG/24HR 24 hr patch Place 1 patch onto the skin daily 5/21/17  Yes Camilo Ross MD   montelukast (SINGULAIR) 10 MG tablet Take 10 mg by mouth At Bedtime   Yes Reported, Patient   CLONAZEPAM PO Take 0.5 mg by mouth 3 times daily   Yes Reported, Patient   Magnesium Hydroxide (MILK OF MAGNESIA PO) Take 30 mLs by mouth nightly as needed 9/18/17   Reported, Patient   IBUPROFEN PO Take 600 mg by mouth every 6 hours as needed for moderate pain    Reported, Patient   albuterol (PROAIR HFA/PROVENTIL HFA/VENTOLIN HFA) 108 (90 BASE) MCG/ACT Inhaler Inhale 2 puffs into the lungs every 4 hours as needed for shortness of breath / dyspnea or  wheezing 5/21/17   Camilo Ross MD   fluticasone-vilanterol (BREO ELLIPTA) 200-25 MCG/INH oral inhaler Inhale 1 puff into the lungs daily 5/21/17   Camilo Ross MD   ipratropium - albuterol 0.5 mg/2.5 mg/3 mL (DUONEB) 0.5-2.5 (3) MG/3ML neb solution Take 1 vial (3 mLs) by nebulization every 4 hours as needed for shortness of breath / dyspnea or wheezing 5/21/17   Camilo Ross MD   senna-docusate (SENOKOT-S;PERICOLACE) 8.6-50 MG per tablet Take 2 tablets by mouth 2 times daily as needed (constipation ) 5/21/17   Camilo Ross MD   fluticasone (VERAMYST) 27.5 MCG/SPRAY spray Spray 2 sprays into both nostrils daily    Reported, Patient   VITAMIN D, CHOLECALCIFEROL, PO Take 5,000 Units by mouth daily    Reported, Patient     Current Facility-Administered Medications Ordered in Epic   Medication Dose Route Frequency Last Rate Last Dose     [START ON 9/23/2017] lidocaine (LIDODERM) 5 % Patch 1 patch  1 patch Transdermal Once PRN in ECT         lidocaine (LIDODERM) patch in PLACE   Transdermal Q8H         [START ON 9/23/2017] lidocaine (LIDODERM) patch REMOVAL   Transdermal Once PRN in ECT         [Auto Hold] lidocaine (LIDODERM) 5 % Patch 1 patch  1 patch Transdermal Once PRN in ECT         [Auto Hold] lidocaine (LIDODERM) patch in PLACE   Transdermal Q8H         [Auto Hold] lidocaine (LIDODERM) patch REMOVAL   Transdermal Once PRN in ECT         lidocaine 1 % 1 mL  1 mL Other Q1H PRN   1 mL at 09/22/17 0633     lactated ringers infusion  500 mL Intravenous Continuous 25 mL/hr at 09/22/17 0633 500 mL at 09/22/17 0633     [Auto Hold] pantoprazole (PROTONIX) EC tablet 40 mg  40 mg Oral Once per day on Mon Wed Fri   40 mg at 09/22/17 0546     [Auto Hold] ipratropium - albuterol 0.5 mg/2.5 mg/3 mL (DUONEB) neb solution 3 mL  3 mL Nebulization Q4H PRN         [Auto Hold] ipratropium - albuterol 0.5 mg/2.5 mg/3 mL (DUONEB) neb solution 3 mL  3 mL Nebulization Once per day on Mon Wed Fri   3 mL  at 09/22/17 0633     [Auto Hold] albuterol (PROAIR HFA/PROVENTIL HFA/VENTOLIN HFA) Inhaler 2 puff  2 puff Inhalation Q4H PRN         [Auto Hold] fluticasone-vilanterol (BREO ELLIPTA) 200-25 MCG/INH oral inhaler 1 puff  1 puff Inhalation Daily   1 puff at 09/21/17 0752     [Auto Hold] hydrOXYzine (ATARAX) tablet 25-50 mg  25-50 mg Oral Q4H PRN   50 mg at 09/19/17 2103     [Auto Hold] ibuprofen (ADVIL/MOTRIN) tablet 600 mg  600 mg Oral Q6H PRN   600 mg at 09/21/17 2101     [Auto Hold] magnesium hydroxide (MILK OF MAGNESIA) suspension 30 mL  30 mL Oral At Bedtime PRN         [Auto Hold] montelukast (SINGULAIR) tablet 10 mg  10 mg Oral At Bedtime   10 mg at 09/21/17 2101     [Auto Hold] senna-docusate (SENOKOT-S;PERICOLACE) 8.6-50 MG per tablet 2 tablet  2 tablet Oral BID PRN         [Auto Hold] traZODone (DESYREL) tablet  mg   mg Oral At Bedtime PRN   100 mg at 09/21/17 2101     [Auto Hold] cholecalciferol (vitamin D3) capsule CAPS 5,000 Units  5,000 Units Oral Daily   5,000 Units at 09/21/17 0752     [Auto Hold] clonazePAM (klonoPIN) tablet 0.5 mg  0.5 mg Oral BID   0.5 mg at 09/21/17 0752     [Auto Hold] OLANZapine (zyPREXA) injection 5 mg  5 mg Intramuscular Q6H PRN        Or     [Auto Hold] OLANZapine zydis (zyPREXA) ODT tab 5 mg  5 mg Oral Q6H PRN   5 mg at 09/19/17 2105     [Auto Hold] fluticasone (FLONASE) 50 MCG/ACT spray 2 spray  2 spray Both Nostrils Daily   2 spray at 09/21/17 0752     No current Epic-ordered outpatient prescriptions on file.     Wt Readings from Last 1 Encounters:   09/19/17 79.5 kg (175 lb 3.2 oz)     Temp Readings from Last 1 Encounters:   09/22/17 36.6  C (97.8  F)     BP Readings from Last 6 Encounters:   09/22/17 108/76   09/16/17 120/51   07/28/17 106/74   05/21/17 102/59   10/05/11 99/53     Pulse Readings from Last 4 Encounters:   09/22/17 64   09/16/17 84   07/28/17 120   05/21/17 52     Resp Readings from Last 1 Encounters:   09/22/17 18     SpO2 Readings from Last 1  Encounters:   09/22/17 98%     Recent Labs   Lab Test  09/17/17   1134  07/17/17   0609   NA  139  142   POTASSIUM  3.8  3.6   CHLORIDE  106  107   CO2  23  24   ANIONGAP  10  11   GLC  80  97   BUN  9  13   CR  0.76  1.01   NICHOLAS  8.8  9.1     Recent Labs   Lab Test  09/17/17   1134  05/18/17   1700   AST  11  15   ALT  17  22     Recent Labs   Lab Test  09/17/17   0722  07/17/17   0609   WBC  8.1  9.2   HGB  13.3  15.5   PLT  227  229     No results for input(s): INR in the last 73672 hours.    Invalid input(s): APTT   Recent Labs   Lab Test  05/21/17   0940  05/21/17   0656   TROPI  <0.015  The 99th percentile for upper reference range is 0.045 ug/L.  Troponin values in   the range of 0.045 - 0.120 ug/L may be associated with risks of adverse   clinical events.    <0.015  The 99th percentile for upper reference range is 0.045 ug/L.  Troponin values in   the range of 0.045 - 0.120 ug/L may be associated with risks of adverse   clinical events.       RECENT LABS:   ECG:   ECHO:   CXR:    Anesthesia Evaluation     . Pt has had prior anesthetic.     No history of anesthetic complications          ROS/MED HX    ENT/Pulmonary: Comment: Previous history of aspiration pneumonia with ECT.    (+)Intermittent asthma , . .    Neurologic:       Cardiovascular:         METS/Exercise Tolerance:     Hematologic:         Musculoskeletal:         GI/Hepatic:         Renal/Genitourinary:         Endo:         Psychiatric:         Infectious Disease:         Malignancy:         Other:                     Physical Exam  Normal systems: cardiovascular, pulmonary and dental    Airway   Mallampati: I  Neck ROM: full    Dental     Cardiovascular       Pulmonary                         Anesthesia Plan      History & Physical Review  History and physical reviewed and following examination; no interval change.    ASA Status:  2 .    NPO Status:  > 8 hours    Plan for Other, General and ETT with Intravenous induction.     Additional equipment:  Videolaryngoscope      Postoperative Care  Postoperative pain management:  IV analgesics.      Consents  Anesthetic plan, risks, benefits and alternatives discussed with:  Patient..                          .

## 2017-09-22 NOTE — PROGRESS NOTES
Discharge teaching done with pt and pt's .  Pt denied SI. Pt verbalized understanding of medications and out pt f/u TX plan. Belongings returned to pt at discharge.  Pt and pt's  are aware that pt should not drive for one week after ECT. Medication filled here and sent home with pt. Pt discharged to home.

## 2017-09-22 NOTE — DISCHARGE SUMMARY
[facility] Psychiatric Discharge Summary      DATE OF ADMISSION: 2017     DATE OF DISCHARGE: 2017    PRIMARY CARE PHYSICIAN: Anabel Lord MD    IDENTIFICATION: Ms. Nuha Lees is a 32-year-old female with a history of depression, anxiety, panic disorder, and Cannabis dependence who was admitted to [address] and transferred to [facility]  for ECT. For history, see dictation by Dr. Leon on 2017. For physical summary, see dictation by NATHAN Campos CNP on 2017.     HOSPITAL COURSE: Ms. Lees has historical diagnoses of major depressive disorder, panic disorder with agoraphobia and generalized anxiety disorder. She was transferred to [facility] at the [facility] from [facility] for ECT in the OR due to concerns about complications related to Pt's asthma. She was most recently hospitalized at [facility] from 2017 through 2017.  During that hospitalization GeneSight testing was reviewed.  She was tapered off Effexor and Fetzima was initiated.  She underwent a course of 6 ECT treatments, the last one completed in the operating room because of her severe asthma.  She discharged to home.  No medication changes were made.  She was medication compliant following discharge.  She switched her Fetzima to bedtime dosing because she was experiencing a headache throughout the day when she took it in the daytime.  The patient reports that she has had some stressors.  Her father has CHF and diabetes.  She was on a leave of absence from work and on short-term disability; however, after this short-term disability  she was approved for SSDI.  She says that she, her , and her two children all have birthdays in mid August through mid September.  She has had some difficulty coping with this, especially as her depressive symptoms were worsening and she was spending more and more time in bed.  was  "her 's 39th birthday party.  She was unable to get out of bed to participate.  She drove herself to [facility] in [place], and she was referred for psychiatric hospitalization. Her mood is depressed.  She has suicidal thoughts with a plan to overdose on all of her and her 's medications, drink wine and then hang herself from the bathroom basement door so that her children do not find her.  She says she has written letters and had selected 10/01/2017 after date she would follow through with this.  She has had suicidal thoughts since early August.  She has low interest, anhedonia, hypersomnia, low energy and low motivation.  Her concentration is \"horrible.\"  She has some memory impairment for memories over the last year.  She has feelings of hopelessness, helplessness, worthlessness and guilt.  She has been crying frequently.  She has had a low appetite.  She has also purposely been restricting her food intake because she is dissatisfied with her body size and weight.  She has panic attacks about twice per week.  She is not sure what triggers them.  They are characterized by feeling clammy, sweaty, short of breath and racing heart.  She rarely leaves the home and yard.  She has social anxiety, fears that others are watching her and judging her.  She reports that she likes to do everything in threes,  for example tapping things 3 times and cutting her food 3 times.  This has been less problematic since her most recent hospitalization.  She reports that she hears a voice inside of her head saying \"just do it, just get it over with\" pertaining to suicide.  She says that for the first time ever, she had some visual hallucinations last week when she went to take her dog into the yard; she saw trees which appeared to be like neon tombs.  When she was in the Emergency Department yesterday she saw bugs on the floor. Discussed Emsam. Ordered a Pulmonology consult due to history of severe asthma and asthma attack " during ECT. Discontinued Fetzima. Start ECT tomorrow.     Pt was transferred to [facility] for ECT in the OR due to concerns about complications related to her asthma. On 9/19 She presents with severe depression, and borderline traits. Discontinued Fetzima and arranged to begin a course of 6 ECT treatments tomorrow 9/20. On 9/21 Pt had improved dramatically. Determined to have one more ECT on 9/22, then maintenance ECT 1 month after that. On 9/22 Pt had a second ECT treatment with no complications. She continued to improve. She denied symptoms of depression, and denied thought of self harm or suicide. Planned outpatient ECT treatment in 1 month. Tolerating medications without side effects. Side effects, risks, and benefits of medications reviewed with patient. Patient is currently negative for suicide ideation, negative for plan or intent, able to contract no self harm and identify barriers to suicide.  Negative for obsessions, compulsions or psychosis.       DISCHARGE MENTAL STATUS EXAMINATION:    Appearance Sitting in chair, dressed in scrubs. Appears stated age.   Attitude Cooperative   Orientation Oriented to person, place, time   Eye Contact Poor   Speech Regular rate, rhythm, volume and tone   Language Normal   Psychomotor Behavior Normal   Mood Optimistic   Affect Broad range   Thought Process Goal-Oriented, Intact   Associations Intact   Thought Content Patient is currently negative for suicide ideation, negative for plan or intent, able to contract no self harm and identify barriers to suicide.  Negative for obsessions, compulsions or psychosis.      Fund of Knowledge Appropriate   Insight Fair   Judgement Improving   Attention Span & Concentration Alert   Recent & Remote Memory Intact   Gait Normal   Muscle Tone Intact          LABORATORY DATA:    Refer to hospitalist admission dictation.  No results found for this or any previous visit (from the past 24 hour(s)).     /62  Pulse 64  Temp 97.9  F (36.6  C)  "(Oral)  Resp 16  Ht 1.626 m (5' 4\")  Wt 79.5 kg (175 lb 3.2 oz)  SpO2 95%  BMI 30.07 kg/m2     DISCHARGE MEDICATIONS:      Review of your medicines      START taking       Dose / Directions    vilazodone 20 MG Tabs tablet   Commonly known as:  VIIBRYD   Used for:  Severe recurrent major depression without psychotic features (H)        Dose:  20 mg   Start taking on:  9/23/2017   Take 1 tablet (20 mg) by mouth daily   Quantity:  30 tablet   Refills:  0         CONTINUE these medicines which may have CHANGED, or have new prescriptions. If we are uncertain of the size of tablets/capsules you have at home, strength may be listed as something that might have changed.       Dose / Directions    traZODone 50 MG tablet   Commonly known as:  DESYREL   This may have changed:  how much to take   Used for:  Severe recurrent major depression without psychotic features (H)        Dose:  50 mg   Take 1 tablet (50 mg) by mouth nightly as needed for sleep   Quantity:  15 tablet   Refills:  0         CONTINUE these medicines which have NOT CHANGED       Dose / Directions    albuterol 108 (90 BASE) MCG/ACT Inhaler   Commonly known as:  PROAIR HFA/PROVENTIL HFA/VENTOLIN HFA        Dose:  2 puff   Inhale 2 puffs into the lungs every 4 hours as needed for shortness of breath / dyspnea or wheezing   Refills:  0       CLONAZEPAM PO        Dose:  0.5 mg   Take 0.5 mg by mouth 3 times daily   Refills:  0       fluticasone 27.5 MCG/SPRAY spray   Commonly known as:  VERAMYST        Dose:  2 spray   Spray 2 sprays into both nostrils daily   Refills:  0       fluticasone-vilanterol 200-25 MCG/INH oral inhaler   Commonly known as:  BREO ELLIPTA   Used for:  Asthma exacerbation        Dose:  1 puff   Inhale 1 puff into the lungs daily   Quantity:  1 Inhaler   Refills:  0       hydrOXYzine 25 MG tablet   Commonly known as:  ATARAX   Used for:  Anxiety        Dose:  25-50 mg   Take 1-2 tablets (25-50 mg) by mouth every 4 hours as needed for " anxiety   Quantity:  30 tablet   Refills:  0       IBUPROFEN PO        Dose:  600 mg   Take 600 mg by mouth every 6 hours as needed for moderate pain   Refills:  0       ipratropium - albuterol 0.5 mg/2.5 mg/3 mL 0.5-2.5 (3) MG/3ML neb solution   Commonly known as:  DUONEB        Dose:  3 mL   Take 1 vial (3 mLs) by nebulization every 4 hours as needed for shortness of breath / dyspnea or wheezing   Quantity:  360 mL   Refills:  0       MILK OF MAGNESIA PO        Dose:  30 mL   Take 30 mLs by mouth nightly as needed   Refills:  0       montelukast 10 MG tablet   Commonly known as:  SINGULAIR        Dose:  10 mg   Take 10 mg by mouth At Bedtime   Refills:  0       nicotine 14 MG/24HR 24 hr patch   Commonly known as:  NICODERM CQ   Used for:  Asthma exacerbation        Dose:  1 patch   Place 1 patch onto the skin daily   Quantity:  14 patch   Refills:  0       pantoprazole 40 MG EC tablet   Commonly known as:  PROTONIX   Used for:  Gastroesophageal reflux disease without esophagitis        Dose:  40 mg   Take 1 tablet (40 mg) by mouth every morning (before breakfast)   Quantity:  15 tablet   Refills:  0       senna-docusate 8.6-50 MG per tablet   Commonly known as:  SENOKOT-S;PERICOLACE   Used for:  Slow transit constipation        Dose:  2 tablet   Take 2 tablets by mouth 2 times daily as needed (constipation )   Quantity:  20 tablet   Refills:  0       VITAMIN D (CHOLECALCIFEROL) PO        Dose:  5000 Units   Take 5,000 Units by mouth daily   Refills:  0         STOP taking          levomilnacipran 80 MG 24 hr capsule   Commonly known as:  FETZIMA ER                Where to get your medicines      These medications were sent to [facility] [place], [place] - [address]  [address], [place] [address]     Phone:  [phone]      vilazodone 20 MG Tabs tablet             DISCHARGE DIAGNOSES:    1. Major depressive disorder, recurrent, severe  2. Anxiety, NOS  3. Panic  disorder  4. Cannabis use disorder  5. Tobacco use disorder  6. Borderline personality disorder    DISCHARGE PLAN:     7. Explained side effects, benefits, and complications of medications to the patient, Pt gave verbal consent.  8. Medication changes: begin Viibryd 10mg with intent to titrate  9. Discussed treatment plan with patient and team.  10. Projected length of stay: discharge today  11. Pt to complete one more ECT tomorrow morning  12. Schedule outpatient maintenance ECT in 1 month  13. Pt needs follow up appointments with her outpatient psychiatrist and therapist  14. Viibryd PA    DISCHARGE FOLLOW-UP:    If we are unable to schedule a follow up appointment for you prior to discharge, please call as soon as possible to schedule an appointment with one of Dr. Lemus's partners. You will need to be seen within 30 days in order to get your medications refilled.      [facility]   [address] [place] [address] [address] [address]  Phone: [phone] / Fax: [phone]      Please follow up with your therapist, Zuleyma Rincon, as previously scheduled.      Zuleyma Rincon  [address] [place] [address] [address]  [place], [place] [address] [phone]       Attestation:   Patient has been seen and evaluated by me, Danilo Leon MD.    Patient ID:  Name: Nuha Lees    MRN: [id number]  Admission: 9/18/2017     YOB: 1985

## 2017-09-22 NOTE — ANESTHESIA CARE TRANSFER NOTE
Patient: Nuha Lees    * No procedures listed *    Diagnosis: * No pre-op diagnosis entered *  Diagnosis Additional Information: No value filed.    Anesthesia Type:   Other, General, ETT     Note:  Airway :Face Mask  Patient transferred to:PACU        Vitals: (Last set prior to Anesthesia Care Transfer)    CRNA VITALS  9/22/2017 0721 - 9/22/2017 0801      9/22/2017             NIBP: 113/50    Pulse: 98    NIBP Mean: 93    SpO2: 100 %    Resp Rate (observed): (!)  41    Resp Rate (set): 10                Electronically Signed By: NATHAN Pradhan CRNA  September 22, 2017  8:01 AM

## 2017-09-22 NOTE — PROGRESS NOTES
[facility] Psychiatric Progress Note       Interim History   The patient's care was discussed with the treatment team and chart notes were reviewed. Pt seen on SDU. Tolerating medications without side effects. Side effects, risks, and benefits of medications reviewed with patient. Patient is currently negative for suicide ideation, negative for plan or intent, able to contract no self harm and identify barriers to suicide.  Negative for obsessions, compulsions or psychosis. Pt continues to do well. She had a second treatment of ECT this morning with no complications. Planning on maintenance ECT treatment in 1 month. She denied symptoms of depression and denied thoughts of self harm or suicide. She was exited and ready to go home. Plan to arrange appointment with her psychiatrist and therapist. Pt to discharge today.      Hospital Course     Pt was transferred to [facility] for ECT in the OR due to concerns about complications related to her asthma. On 9/19 She presents with severe depression, and borderline traits. Discontinued Fetzima and arranged to begin a course of 6 ECT treatments tomorrow 9/20. On 9/21 Pt had improved dramatically. Determined to have one more ECT on 9/22, then maintenance ECT 1 month after that. On 9/22 Pt had a second ECT treatment with no complications. She continued to improve. She denied symptoms of depression, and denied thought of self harm or suicide. Planned outpatient ECT treatment in 1 month. Tolerating medications without side effects. Side effects, risks, and benefits of medications reviewed with patient. Patient is currently negative for suicide ideation, negative for plan or intent, able to contract no self harm and identify barriers to suicide.  Negative for obsessions, compulsions or psychosis.        Medications     Current Facility-Administered Medications Ordered in Epic   Medication Dose Route Frequency Last Rate Last Dose     [START ON 9/23/2017] lidocaine  (LIDODERM) 5 % Patch 1 patch  1 patch Transdermal Once PRN in ECT         lidocaine (LIDODERM) patch in PLACE   Transdermal Q8H         [START ON 9/23/2017] lidocaine (LIDODERM) patch REMOVAL   Transdermal Once PRN in ECT         lidocaine (LIDODERM) 5 % Patch 1 patch  1 patch Transdermal Once PRN in ECT         lidocaine (LIDODERM) patch in PLACE   Transdermal Q8H         lidocaine (LIDODERM) patch REMOVAL   Transdermal Once PRN in ECT         lidocaine 1 % 1 mL  1 mL Other Q1H PRN   1 mL at 09/22/17 0633     lactated ringers infusion  500 mL Intravenous Continuous 25 mL/hr at 09/22/17 0633 500 mL at 09/22/17 0633     pantoprazole (PROTONIX) EC tablet 40 mg  40 mg Oral Once per day on Mon Wed Fri   40 mg at 09/22/17 0546     ipratropium - albuterol 0.5 mg/2.5 mg/3 mL (DUONEB) neb solution 3 mL  3 mL Nebulization Q4H PRN   3 mL at 09/22/17 0802     ipratropium - albuterol 0.5 mg/2.5 mg/3 mL (DUONEB) neb solution 3 mL  3 mL Nebulization Once per day on Mon Wed Fri   3 mL at 09/22/17 0633     albuterol (PROAIR HFA/PROVENTIL HFA/VENTOLIN HFA) Inhaler 2 puff  2 puff Inhalation Q4H PRN         fluticasone-vilanterol (BREO ELLIPTA) 200-25 MCG/INH oral inhaler 1 puff  1 puff Inhalation Daily   1 puff at 09/21/17 0752     hydrOXYzine (ATARAX) tablet 25-50 mg  25-50 mg Oral Q4H PRN   50 mg at 09/19/17 2103     ibuprofen (ADVIL/MOTRIN) tablet 600 mg  600 mg Oral Q6H PRN   600 mg at 09/21/17 2101     magnesium hydroxide (MILK OF MAGNESIA) suspension 30 mL  30 mL Oral At Bedtime PRN         montelukast (SINGULAIR) tablet 10 mg  10 mg Oral At Bedtime   10 mg at 09/21/17 2101     senna-docusate (SENOKOT-S;PERICOLACE) 8.6-50 MG per tablet 2 tablet  2 tablet Oral BID PRN         traZODone (DESYREL) tablet  mg   mg Oral At Bedtime PRN   100 mg at 09/21/17 8151     cholecalciferol (vitamin D3) capsule CAPS 5,000 Units  5,000 Units Oral Daily   5,000 Units at 09/21/17 0752     clonazePAM (klonoPIN) tablet 0.5 mg  0.5 mg  "Oral BID   0.5 mg at 09/21/17 0752     OLANZapine (zyPREXA) injection 5 mg  5 mg Intramuscular Q6H PRN        Or     OLANZapine zydis (zyPREXA) ODT tab 5 mg  5 mg Oral Q6H PRN   5 mg at 09/19/17 2105     fluticasone (FLONASE) 50 MCG/ACT spray 2 spray  2 spray Both Nostrils Daily   2 spray at 09/21/17 0752     No current Epic-ordered outpatient prescriptions on file.         Allergies      Allergies   Allergen Reactions     Codeine Sulfate      Patient reported tolerated Ultram in the past     Sulfa Drugs      Trintellix [Vortioxetine]         Medical Review of Systems   /62  Pulse 64  Temp 97.9  F (36.6  C) (Oral)  Resp 16  Ht 1.626 m (5' 4\")  Wt 79.5 kg (175 lb 3.2 oz)  SpO2 95%  BMI 30.07 kg/m2  Body mass index is 30.07 kg/(m^2).  A 10-point review of systems was performed by Danilo Leon MD and is negative, no new findings.      Psychiatric Examination     Appearance Sitting in chair, dressed in scrubs. Appears stated age.   Attitude Cooperative   Orientation Oriented to person, place, time   Eye Contact Poor   Speech Regular rate, rhythm, volume and tone   Language Normal   Psychomotor Behavior Normal   Mood Optimistic   Affect Broad range   Thought Process Goal-Oriented, Intact   Associations Intact   Thought Content Patient is currently negative for suicide ideation, negative for plan or intent, able to contract no self harm and identify barriers to suicide.  Negative for obsessions, compulsions or psychosis.      Fund of Knowledge Appropriate   Insight Fair   Judgement Improving   Attention Span & Concentration Alert   Recent & Remote Memory Intact   Gait Normal   Muscle Tone Intact        Labs   Labs reviewed.  No results found for this or any previous visit (from the past 24 hour(s)).     Impression     Ms. Nuha Lees is a 32-year-old female with a history of depression, anxiety, panic disorder, and Cannabis dependence who was admitted to [address]" and transferred to [facility] for ECT, as ECT at [facility] is performed in the OR and there are concerns about complications related to Pt's asthma. Pt presented with severe depression and borderline traits. Discontinued Fetzima due to a history of side effects, including headaches and high blood pressure, and lack of efficacy. Pt completed her first ECT on 9/20 and responded remarkably well. She is much less depressed, and has a broader and brighter affect. She had a second ECT today and continues to improve. Plan to schedule maintenance ECT in 1 month. Pt denies symptoms of depression, and denies thoughts of self harm or suicide. Plan to start Viibryd titration. She will need to follow up with her outpatient psychiatrist and therapist. Pt to discharge today.      Diagnoses   1. Major depressive disorder, recurrent, severe  2. Anxiety, NOS  3. Panic disorder  4. Cannabis use disorder  5. Tobacco use disorder  6. Borderline personality disorder     Plan     1. Explained side effects, benefits, and complications of medications to the patient, Pt gave verbal consent.  2. Medication changes: begin Viibryd 10mg with intent to titrate  3. Discussed treatment plan with patient and team.  4. Projected length of stay: discharge today  5. Pt to complete one more ECT tomorrow morning  6. Schedule outpatient maintenance ECT in 1 month  7. Pt needs follow up appointments with her outpatient psychiatrist and therapist  8. Viibrybritta CEDILLO    Attestation:   Patient has been seen and evaluated by me, Danilo Leon MD.    Patient ID:  Name: Nuha Lees    MRN: [id number]  Admission: 9/18/2017     YOB: 1985

## 2017-09-22 NOTE — ANESTHESIA POSTPROCEDURE EVALUATION
Patient: Nuha Lees    * No procedures listed *    Diagnosis:* No pre-op diagnosis entered *  Diagnosis Additional Information: No value filed.    Anesthesia Type:  Other, General, ETT, RSI    Note:  Anesthesia Post Evaluation    Patient location during evaluation: PACU  Patient participation: Able to fully participate in evaluation  Level of consciousness: awake  Pain management: adequate  Airway patency: patent  Cardiovascular status: acceptable  Respiratory status: acceptable  Hydration status: acceptable  PONV: none     Anesthetic complications: None          Last vitals:  Vitals:    09/22/17 0830 09/22/17 0835 09/22/17 0840   BP: 103/57 108/64 104/79   Pulse:      Resp: 21 16 17   Temp:      SpO2: 95% 92% 96%         Electronically Signed By: Julia Lamb MD, MD  September 22, 2017  8:46 AM

## 2017-09-22 NOTE — PLAN OF CARE
Problem: Depressive Symptoms  Goal: Depressive Symptoms  Signs and symptoms of listed problems will be absent or manageable.   Pt had ECT #1 yesterday. Pt spent most of the shift playing cards in the lounge. Social with staff and peers. Attended OT and completed a project.  Pt does not want to continue with all 6 series ECT and will likely be discharged tomorrow after her 2nd ECT.

## 2017-09-22 NOTE — PROCEDURES
[facility] ECT Procedure Note     Nuha Lees [id number]   32 year old 1985     Patient Status: Inpatient    Allergies   Allergen Reactions     Codeine Sulfate      Patient reported tolerated Ultram in the past     Sulfa Drugs      Trintellix [Vortioxetine]        Weight:  175 lbs 3.2 oz    Patient Preparations: Other (comment)         Diagnosis:   Major depression       Indications for ECT:   Medications ineffective and History of good ECT response in one or more previous episodes of illness       Pause for the Cause:     Right patient Yes   Right procedure/laterality settings: Yes   Right diagnosis Yes          Intra-Procedure Documentation:     Date:  9/22/2017  Time:  6:46 AM    ECT #    Treatment number this series: 2   Total treatment number: 8   Type of ECT:  Bilateral, standard    ECT Medications administered: Brevital: 100mg  Succinyl Choline: 100mg         Clinical Narrative:     ECT was administered by Thymatron machine.  Pt has been medically cleared for procedure, consent signed. Side effects, Risks and benefits reviewed.    ECT Strip Summary:   Energy Level: 60 percent  Motor Seizure Duration: 30 seconds  EEG Seizure Duration: 30 seconds    Complications: Yes Was intubated with glidescope in the past due to aspiration pneumonia in the past    Plan: 3rd ECT 9/25/17  Outpatient Psychiatrist: Dr Lemus

## 2017-09-22 NOTE — DISCHARGE INSTRUCTIONS
Behavioral Discharge Planning and Instructions    Summary:   Admitted for ECT    Main Diagnosis:   Major Depressive Disorder, recurrent, severe; Anxiety, NOS; Panic Disorder; Cannabis Dependence; Tobacco Use Disorder; Borderline Personality Disorder    Major Treatments, Procedures and Findings:  Psychiatric assessment. Medication adjustment. ECT    Symptoms to Report: Losing more sleep, Mood getting worse or Thoughts of suicide    Lifestyle Adjustment:  Follow all treatment recommendations. Develop and follow treatment plan. Utilize positive coping skills to manage symptoms of depression and anxiety.     Psychiatry Follow-up:     If we are unable to schedule a follow up appointment for you prior to discharge, please call as soon as possible to schedule an appointment with one of Dr. Lemus's partners. You will need to be seen within 30 days in order to get your medications refilled.     [email or web address]   [address] [place] [address]  [address], [place] [address]  Phone: [phone] / Fax: [phone]     Please follow up with your therapist, Zuleyma Rincon, as previously scheduled.     Zuleyma Rincon  [address] [place] [address] [address]  [place] [address]  [phone]    Resources:   Crisis Intervention: 610.303.4600 or 158-382-7366 (TTY: 752.621.1679).  Call anytime for help.  National [place] on Mental Illness (www.[place].kelly.org): 489.104.2544 or 436-234-1898.  Alcoholics Anonymous (www.alcoholics-anonymous.org): Check your phone book for your local chapter.  National Suicide Prevention Line (www.mentalhealthmn.org): 744-136-NKMZ (4938)  Josef Aj Crisis Line [facility] / 760.378.4269    General Medication Instructions:   See your medication sheet(s) for instructions.   Take all medicines as directed.  Make no changes unless your doctor suggests them.   Go to all your doctor visits.  Be sure to have all your required lab tests. This way, your medicines can be refilled on time.  Do not use any drugs not  prescribed by your doctor.  Avoid alcohol.

## 2017-09-25 ENCOUNTER — ANESTHESIA EVENT (OUTPATIENT)
Dept: SURGERY | Facility: CLINIC | Age: 32
End: 2017-09-25

## 2017-09-25 ENCOUNTER — ANESTHESIA (OUTPATIENT)
Dept: SURGERY | Facility: CLINIC | Age: 32
End: 2017-09-25

## 2017-10-11 ENCOUNTER — HOSPITAL ENCOUNTER (OUTPATIENT)
Dept: SURGERY | Facility: CLINIC | Age: 32
Discharge: HOME | End: 2017-10-11
Attending: PSYCHIATRY & NEUROLOGY | Admitting: PSYCHIATRY & NEUROLOGY
Payer: COMMERCIAL

## 2017-10-11 ENCOUNTER — ANESTHESIA (OUTPATIENT)
Dept: SURGERY | Facility: CLINIC | Age: 32
End: 2017-10-11

## 2017-10-11 ENCOUNTER — ANESTHESIA EVENT (OUTPATIENT)
Dept: SURGERY | Facility: CLINIC | Age: 32
End: 2017-10-11

## 2017-10-11 VITALS
OXYGEN SATURATION: 94 % | HEART RATE: 82 BPM | RESPIRATION RATE: 18 BRPM | DIASTOLIC BLOOD PRESSURE: 51 MMHG | SYSTOLIC BLOOD PRESSURE: 101 MMHG

## 2017-10-11 DIAGNOSIS — F33.2 SEVERE RECURRENT MAJOR DEPRESSION WITHOUT PSYCHOTIC FEATURES (H): ICD-10-CM

## 2017-10-11 PROCEDURE — 25000128 H RX IP 250 OP 636: Performed by: PSYCHIATRY & NEUROLOGY

## 2017-10-11 PROCEDURE — 25000125 ZZHC RX 250: Performed by: ANESTHESIOLOGY

## 2017-10-11 PROCEDURE — 37000008 ZZH ANESTHESIA TECHNICAL FEE, 1ST 30 MIN

## 2017-10-11 PROCEDURE — 25000128 H RX IP 250 OP 636: Performed by: NURSE ANESTHETIST, CERTIFIED REGISTERED

## 2017-10-11 PROCEDURE — 90870 ELECTROCONVULSIVE THERAPY: CPT

## 2017-10-11 PROCEDURE — 25000128 H RX IP 250 OP 636: Performed by: ANESTHESIOLOGY

## 2017-10-11 PROCEDURE — 40000010 ZZH STATISTIC ANES STAT CODE-CRNA PER MINUTE

## 2017-10-11 PROCEDURE — 25000125 ZZHC RX 250: Performed by: NURSE ANESTHETIST, CERTIFIED REGISTERED

## 2017-10-11 RX ORDER — LIDOCAINE 50 MG/G
1 PATCH TOPICAL
Status: CANCELLED | OUTPATIENT
Start: 2017-10-12

## 2017-10-11 RX ORDER — GLYCOPYRROLATE 0.2 MG/ML
INJECTION, SOLUTION INTRAMUSCULAR; INTRAVENOUS PRN
Status: DISCONTINUED | OUTPATIENT
Start: 2017-10-11 | End: 2017-10-11

## 2017-10-11 RX ORDER — KETOROLAC TROMETHAMINE 30 MG/ML
30 INJECTION, SOLUTION INTRAMUSCULAR; INTRAVENOUS
Status: CANCELLED
Start: 2017-10-11

## 2017-10-11 RX ORDER — LIDOCAINE 40 MG/G
CREAM TOPICAL
Status: DISCONTINUED | OUTPATIENT
Start: 2017-10-11 | End: 2017-10-12 | Stop reason: HOSPADM

## 2017-10-11 RX ORDER — SODIUM CHLORIDE, SODIUM LACTATE, POTASSIUM CHLORIDE, CALCIUM CHLORIDE 600; 310; 30; 20 MG/100ML; MG/100ML; MG/100ML; MG/100ML
500 INJECTION, SOLUTION INTRAVENOUS CONTINUOUS
Status: DISCONTINUED | OUTPATIENT
Start: 2017-10-11 | End: 2017-10-12 | Stop reason: HOSPADM

## 2017-10-11 RX ORDER — ALBUTEROL SULFATE 0.83 MG/ML
2.5 SOLUTION RESPIRATORY (INHALATION)
Status: COMPLETED | OUTPATIENT
Start: 2017-10-11 | End: 2017-10-11

## 2017-10-11 RX ORDER — KETOROLAC TROMETHAMINE 30 MG/ML
30 INJECTION, SOLUTION INTRAMUSCULAR; INTRAVENOUS
Status: DISCONTINUED | OUTPATIENT
Start: 2017-10-11 | End: 2017-10-12 | Stop reason: HOSPADM

## 2017-10-11 RX ADMIN — ALBUTEROL SULFATE 2.5 MG: 2.5 SOLUTION RESPIRATORY (INHALATION) at 06:45

## 2017-10-11 RX ADMIN — GLYCOPYRROLATE 0.2 MG: 0.2 INJECTION, SOLUTION INTRAMUSCULAR; INTRAVENOUS at 08:02

## 2017-10-11 RX ADMIN — KETOROLAC TROMETHAMINE 30 MG: 30 INJECTION, SOLUTION INTRAMUSCULAR at 07:23

## 2017-10-11 RX ADMIN — SODIUM CHLORIDE, SODIUM LACTATE, POTASSIUM CHLORIDE, CALCIUM CHLORIDE 500 ML: 600; 310; 30; 20 INJECTION, SOLUTION INTRAVENOUS at 06:24

## 2017-10-11 RX ADMIN — SUCCINYLCHOLINE CHLORIDE 100 MG: 20 INJECTION, SOLUTION INTRAMUSCULAR; INTRAVENOUS at 07:59

## 2017-10-11 RX ADMIN — METHOHEXITAL SODIUM 100 MG: 500 INJECTION, POWDER, LYOPHILIZED, FOR SOLUTION INTRAMUSCULAR; INTRAVENOUS; RECTAL at 07:58

## 2017-10-11 NOTE — ANESTHESIA PREPROCEDURE EVALUATION
Anesthesia Evaluation     . Pt has had prior anesthetic.     No history of anesthetic complications          ROS/MED HX    ENT/Pulmonary: Comment: Previous history of aspiration pneumonia with ECT.    (+)Intermittent asthma , . .   (-) sleep apnea   Neurologic:       Cardiovascular:         METS/Exercise Tolerance:     Hematologic:         Musculoskeletal:         GI/Hepatic:        (-) GERD   Renal/Genitourinary:         Endo:         Psychiatric:     (+) psychiatric history (severe requiring multiple ECT) depression      Infectious Disease:         Malignancy:         Other:                     Physical Exam  Normal systems: cardiovascular, pulmonary and dental    Airway   Mallampati: I  TM distance: >3 FB  Neck ROM: full    Dental     Cardiovascular       Pulmonary                         Anesthesia Plan      History & Physical Review  History and physical reviewed and following examination; no interval change.    ASA Status:  3 .    NPO Status:  > 8 hours    Plan for General with Intravenous induction.     I had a long conversation with the patient about the procedure and aspiration.  She said her first experience she aspirated bc she was so nervous and worked herself up into throwing up right before the procedure.  Since then they have intubated her with every procedure.  Given she has no GERD or other risk factors for aspiration, it is likely this first experience was due to her anxiety/emesis with that particular procedure, so we will not intubate her.      Postoperative Care      Consents  Anesthetic plan, risks, benefits and alternatives discussed with:  Patient..                          .

## 2017-10-11 NOTE — DISCHARGE INSTRUCTIONS
Discharge to home with  korina. Discharge instructions given to . Dr Ash rounding ok to dc to home.

## 2017-10-11 NOTE — ANESTHESIA POSTPROCEDURE EVALUATION
Patient: Nuha Lees    * No procedures listed *    Diagnosis:* No pre-op diagnosis entered *  Diagnosis Additional Information: No value filed.    Anesthesia Type:  General    Note:  Anesthesia Post Evaluation    Patient location during evaluation: PACU  Patient participation: Able to fully participate in evaluation  Level of consciousness: awake and alert  Pain management: adequate  Airway patency: patent  Cardiovascular status: acceptable  Respiratory status: acceptable  Hydration status: acceptable  PONV: none     Anesthetic complications: None          Last vitals:  Vitals:    10/11/17 0835 10/11/17 0840 10/11/17 0845   BP: 101/65 100/62 101/51   Pulse:      Resp: 20 19 18   SpO2: 91% 92% 94%         Electronically Signed By: Emily Ash MD  October 11, 2017  10:47 AM

## 2017-10-11 NOTE — PROCEDURES
[facility] ECT Procedure Note     Nuha Lees [id number]   32 year old 1985     Patient Status: Inpatient    Allergies   Allergen Reactions     Codeine Sulfate      Patient reported tolerated Ultram in the past     Sulfa Drugs      Trintellix [Vortioxetine]        Weight:  0 lbs 0 oz    Patient Preparations: Other (comment)         Diagnosis:   Major depression       Indications for ECT:   Medications ineffective and History of good ECT response in one or more previous episodes of illness       Pause for the Cause:     Right patient Yes   Right procedure/laterality settings: Yes   Right diagnosis Yes          Intra-Procedure Documentation:     Date:  10/11/2017  Time:  6:46 AM    ECT #    Treatment number this series: 1 (M)   Total treatment number: 9   Type of ECT:  Bilateral, standard    ECT Medications administered: Brevital: 100mg  Succinyl Choline: 100mg   toradol         Clinical Narrative:     ECT was administered by Thymatron machine.  Pt has been medically cleared for procedure, consent signed. Side effects, Risks and benefits reviewed.    ECT Strip Summary:   Energy Level: 100 percent  Motor Seizure Duration: 30 seconds  EEG Seizure Duration: 30 seconds    Complications: Yes Was intubated with glidescope in the past due to aspiration pneumonia in the past    Plan: next ect 10/27/17  Outpatient Psychiatrist: Dr Lemus

## 2017-10-27 ENCOUNTER — ANESTHESIA EVENT (OUTPATIENT)
Dept: SURGERY | Facility: CLINIC | Age: 32
End: 2017-10-27

## 2017-10-27 ENCOUNTER — ANESTHESIA (OUTPATIENT)
Dept: SURGERY | Facility: CLINIC | Age: 32
End: 2017-10-27

## 2017-10-27 ENCOUNTER — HOSPITAL ENCOUNTER (OUTPATIENT)
Dept: SURGERY | Facility: CLINIC | Age: 32
Discharge: HOME | End: 2017-10-27
Attending: PSYCHIATRY & NEUROLOGY | Admitting: PSYCHIATRY & NEUROLOGY
Payer: COMMERCIAL

## 2017-10-27 VITALS
RESPIRATION RATE: 20 BRPM | TEMPERATURE: 98.6 F | SYSTOLIC BLOOD PRESSURE: 104 MMHG | OXYGEN SATURATION: 94 % | DIASTOLIC BLOOD PRESSURE: 67 MMHG

## 2017-10-27 DIAGNOSIS — F33.2 SEVERE RECURRENT MAJOR DEPRESSION WITHOUT PSYCHOTIC FEATURES (H): ICD-10-CM

## 2017-10-27 PROCEDURE — 25000128 H RX IP 250 OP 636: Performed by: ANESTHESIOLOGY

## 2017-10-27 PROCEDURE — 37000008 ZZH ANESTHESIA TECHNICAL FEE, 1ST 30 MIN

## 2017-10-27 PROCEDURE — 90870 ELECTROCONVULSIVE THERAPY: CPT

## 2017-10-27 PROCEDURE — 25000128 H RX IP 250 OP 636: Performed by: NURSE ANESTHETIST, CERTIFIED REGISTERED

## 2017-10-27 PROCEDURE — 40000010 ZZH STATISTIC ANES STAT CODE-CRNA PER MINUTE

## 2017-10-27 PROCEDURE — 25000125 ZZHC RX 250: Performed by: NURSE ANESTHETIST, CERTIFIED REGISTERED

## 2017-10-27 PROCEDURE — 25000128 H RX IP 250 OP 636: Performed by: PSYCHIATRY & NEUROLOGY

## 2017-10-27 RX ORDER — ONDANSETRON 4 MG/1
4 TABLET, ORALLY DISINTEGRATING ORAL EVERY 30 MIN PRN
Status: DISCONTINUED | OUTPATIENT
Start: 2017-10-27 | End: 2017-10-28 | Stop reason: HOSPADM

## 2017-10-27 RX ORDER — ALBUTEROL SULFATE 0.83 MG/ML
2.5 SOLUTION RESPIRATORY (INHALATION) EVERY 4 HOURS PRN
Status: DISCONTINUED | OUTPATIENT
Start: 2017-10-27 | End: 2017-10-28 | Stop reason: HOSPADM

## 2017-10-27 RX ORDER — LIDOCAINE 50 MG/G
1 PATCH TOPICAL
Status: DISCONTINUED | OUTPATIENT
Start: 2017-10-28 | End: 2017-10-28 | Stop reason: HOSPADM

## 2017-10-27 RX ORDER — KETOROLAC TROMETHAMINE 30 MG/ML
30 INJECTION, SOLUTION INTRAMUSCULAR; INTRAVENOUS
Status: CANCELLED
Start: 2017-10-27

## 2017-10-27 RX ORDER — HYDROMORPHONE HYDROCHLORIDE 1 MG/ML
.3-.5 INJECTION, SOLUTION INTRAMUSCULAR; INTRAVENOUS; SUBCUTANEOUS EVERY 10 MIN PRN
Status: DISCONTINUED | OUTPATIENT
Start: 2017-10-27 | End: 2017-10-28 | Stop reason: HOSPADM

## 2017-10-27 RX ORDER — ONDANSETRON 2 MG/ML
4 INJECTION INTRAMUSCULAR; INTRAVENOUS EVERY 30 MIN PRN
Status: DISCONTINUED | OUTPATIENT
Start: 2017-10-27 | End: 2017-10-28 | Stop reason: HOSPADM

## 2017-10-27 RX ORDER — KETOROLAC TROMETHAMINE 30 MG/ML
30 INJECTION, SOLUTION INTRAMUSCULAR; INTRAVENOUS
Status: DISCONTINUED | OUTPATIENT
Start: 2017-10-27 | End: 2017-10-28 | Stop reason: HOSPADM

## 2017-10-27 RX ORDER — NALOXONE HYDROCHLORIDE 0.4 MG/ML
.1-.4 INJECTION, SOLUTION INTRAMUSCULAR; INTRAVENOUS; SUBCUTANEOUS
Status: DISCONTINUED | OUTPATIENT
Start: 2017-10-27 | End: 2017-10-28 | Stop reason: HOSPADM

## 2017-10-27 RX ORDER — MEPERIDINE HYDROCHLORIDE 25 MG/ML
12.5 INJECTION INTRAMUSCULAR; INTRAVENOUS; SUBCUTANEOUS
Status: DISCONTINUED | OUTPATIENT
Start: 2017-10-27 | End: 2017-10-28 | Stop reason: HOSPADM

## 2017-10-27 RX ORDER — FENTANYL CITRATE 50 UG/ML
25-50 INJECTION, SOLUTION INTRAMUSCULAR; INTRAVENOUS
Status: DISCONTINUED | OUTPATIENT
Start: 2017-10-27 | End: 2017-10-28 | Stop reason: HOSPADM

## 2017-10-27 RX ORDER — HYDRALAZINE HYDROCHLORIDE 20 MG/ML
2.5-5 INJECTION INTRAMUSCULAR; INTRAVENOUS EVERY 10 MIN PRN
Status: DISCONTINUED | OUTPATIENT
Start: 2017-10-27 | End: 2017-10-28 | Stop reason: HOSPADM

## 2017-10-27 RX ORDER — SODIUM CHLORIDE, SODIUM LACTATE, POTASSIUM CHLORIDE, CALCIUM CHLORIDE 600; 310; 30; 20 MG/100ML; MG/100ML; MG/100ML; MG/100ML
INJECTION, SOLUTION INTRAVENOUS CONTINUOUS
Status: DISCONTINUED | OUTPATIENT
Start: 2017-10-27 | End: 2017-10-28 | Stop reason: HOSPADM

## 2017-10-27 RX ORDER — LIDOCAINE 50 MG/G
1 PATCH TOPICAL
Status: CANCELLED | OUTPATIENT
Start: 2017-10-28

## 2017-10-27 RX ADMIN — METHOHEXITAL SODIUM 100 MG: 500 INJECTION, POWDER, LYOPHILIZED, FOR SOLUTION INTRAMUSCULAR; INTRAVENOUS; RECTAL at 06:30

## 2017-10-27 RX ADMIN — SUCCINYLCHOLINE CHLORIDE 100 MG: 20 INJECTION, SOLUTION INTRAMUSCULAR; INTRAVENOUS at 06:30

## 2017-10-27 RX ADMIN — SODIUM CHLORIDE, POTASSIUM CHLORIDE, SODIUM LACTATE AND CALCIUM CHLORIDE: 600; 310; 30; 20 INJECTION, SOLUTION INTRAVENOUS at 06:26

## 2017-10-27 RX ADMIN — ONDANSETRON 4 MG: 2 SOLUTION INTRAMUSCULAR; INTRAVENOUS at 06:30

## 2017-10-27 RX ADMIN — KETOROLAC TROMETHAMINE 30 MG: 30 INJECTION, SOLUTION INTRAMUSCULAR at 06:29

## 2017-10-27 NOTE — ANESTHESIA POSTPROCEDURE EVALUATION
Patient: Nuha Lees    * No procedures listed *    Diagnosis:* No pre-op diagnosis entered *  Diagnosis Additional Information: No value filed.    Anesthesia Type:  General    Note:  Anesthesia Post Evaluation    Patient location during evaluation: PACU  Patient participation: Able to fully participate in evaluation  Level of consciousness: awake and alert  Pain management: adequate  Airway patency: patent  Cardiovascular status: acceptable, blood pressure returned to baseline and hemodynamically stable  Respiratory status: acceptable  Hydration status: acceptable  PONV: none     Anesthetic complications: None          Last vitals:  Vitals:    10/27/17 0700 10/27/17 0705 10/27/17 0710   BP: 99/71 102/63 104/67   Resp: 12 24 20   Temp:   37  C (98.6  F)   SpO2:  93% 94%         Electronically Signed By: Emily Ash MD  October 27, 2017  7:41 AM

## 2017-10-27 NOTE — ANESTHESIA PREPROCEDURE EVALUATION
Procedure: * No procedures listed *  Preop diagnosis: * No pre-op diagnosis entered *    Allergies   Allergen Reactions     Codeine Sulfate      Patient reported tolerated Ultram in the past     Sulfa Drugs      Trintellix [Vortioxetine]      Past Medical History:   Diagnosis Date     Asthma      Past Surgical History:   Procedure Laterality Date     KNEE SURGERY Right     Roller derby injury      REPAIR PTOSIS  10/5/2011    Procedure:REPAIR PTOSIS; Left Upper Lid Ptosis Repair; Surgeon:MONICA ANDREWS; Location: [facility]     TUBAL LIGATION  2016     Prior to Admission medications    Medication Sig Start Date End Date Taking? Authorizing Provider   vilazodone (VIIBRYD) 20 MG TABS tablet Take 1 tablet (20 mg) by mouth daily 9/23/17 10/23/17  Danilo Leon MD   Magnesium Hydroxide (MILK OF MAGNESIA PO) Take 30 mLs by mouth nightly as needed 9/18/17   Reported, Patient   IBUPROFEN PO Take 600 mg by mouth every 6 hours as needed for moderate pain    Reported, Patient   hydrOXYzine (ATARAX) 25 MG tablet Take 1-2 tablets (25-50 mg) by mouth every 4 hours as needed for anxiety 5/21/17   Camilo Ross MD   albuterol (PROAIR HFA/PROVENTIL HFA/VENTOLIN HFA) 108 (90 BASE) MCG/ACT Inhaler Inhale 2 puffs into the lungs every 4 hours as needed for shortness of breath / dyspnea or wheezing 5/21/17   Cmailo Ross MD   fluticasone-vilanterol (BREO ELLIPTA) 200-25 MCG/INH oral inhaler Inhale 1 puff into the lungs daily 5/21/17   Camilo Ross MD   ipratropium - albuterol 0.5 mg/2.5 mg/3 mL (DUONEB) 0.5-2.5 (3) MG/3ML neb solution Take 1 vial (3 mLs) by nebulization every 4 hours as needed for shortness of breath / dyspnea or wheezing 5/21/17   Camilo Ross MD   traZODone (DESYREL) 50 MG tablet Take 1 tablet (50 mg) by mouth nightly as needed for sleep  Patient taking differently: Take  mg by mouth nightly as needed for sleep  5/21/17   Camilo Ross MD   senna-docusate  (SENOKOT-S;PERICOLACE) 8.6-50 MG per tablet Take 2 tablets by mouth 2 times daily as needed (constipation ) 5/21/17   Camilo Ross MD   pantoprazole (PROTONIX) 40 MG EC tablet Take 1 tablet (40 mg) by mouth every morning (before breakfast) 5/21/17   Camilo Ross MD   nicotine (NICODERM CQ) 14 MG/24HR 24 hr patch Place 1 patch onto the skin daily 5/21/17   Camilo Ross MD   fluticasone (VERAMYST) 27.5 MCG/SPRAY spray Spray 2 sprays into both nostrils daily    Reported, Patient   montelukast (SINGULAIR) 10 MG tablet Take 10 mg by mouth At Bedtime    Reported, Patient   CLONAZEPAM PO Take 0.5 mg by mouth 3 times daily    Reported, Patient   VITAMIN D, CHOLECALCIFEROL, PO Take 5,000 Units by mouth daily    Reported, Patient     Current Outpatient Prescriptions Ordered in Epic   Medication     vilazodone (VIIBRYD) 20 MG TABS tablet     Magnesium Hydroxide (MILK OF MAGNESIA PO)     IBUPROFEN PO     hydrOXYzine (ATARAX) 25 MG tablet     albuterol (PROAIR HFA/PROVENTIL HFA/VENTOLIN HFA) 108 (90 BASE) MCG/ACT Inhaler     fluticasone-vilanterol (BREO ELLIPTA) 200-25 MCG/INH oral inhaler     ipratropium - albuterol 0.5 mg/2.5 mg/3 mL (DUONEB) 0.5-2.5 (3) MG/3ML neb solution     traZODone (DESYREL) 50 MG tablet     senna-docusate (SENOKOT-S;PERICOLACE) 8.6-50 MG per tablet     pantoprazole (PROTONIX) 40 MG EC tablet     nicotine (NICODERM CQ) 14 MG/24HR 24 hr patch     fluticasone (VERAMYST) 27.5 MCG/SPRAY spray     montelukast (SINGULAIR) 10 MG tablet     CLONAZEPAM PO     VITAMIN D, CHOLECALCIFEROL, PO     No current Epic-ordered facility-administered medications on file.      Wt Readings from Last 1 Encounters:   09/19/17 79.5 kg (175 lb 3.2 oz)     Temp Readings from Last 1 Encounters:   09/22/17 36.6  C (97.9  F) (Oral)     BP Readings from Last 6 Encounters:   10/11/17 101/51   09/22/17 102/62   09/16/17 120/51   07/28/17 106/74   05/21/17 102/59   10/05/11 99/53     Pulse Readings from Last 4  Encounters:   10/11/17 82   09/22/17 64   09/16/17 84   07/28/17 120     Resp Readings from Last 1 Encounters:   10/11/17 18     SpO2 Readings from Last 1 Encounters:   10/11/17 94%     Recent Labs   Lab Test  09/17/17   1134  07/17/17   0609   NA  139  142   POTASSIUM  3.8  3.6   CHLORIDE  106  107   CO2  23  24   ANIONGAP  10  11   GLC  80  97   BUN  9  13   CR  0.76  1.01   NICHOLAS  8.8  9.1     Recent Labs   Lab Test  09/17/17   0722  07/17/17   0609   WBC  8.1  9.2   HGB  13.3  15.5   PLT  227  229         Anesthesia Evaluation     . Pt has had prior anesthetic.     No history of anesthetic complications          ROS/MED HX    ENT/Pulmonary: Comment: Previous history of aspiration pneumonia with ECT.    (+)Intermittent asthma , . .   (-) sleep apnea   Neurologic:       Cardiovascular:         METS/Exercise Tolerance:     Hematologic:         Musculoskeletal:         GI/Hepatic:        (-) GERD   Renal/Genitourinary:         Endo:         Psychiatric:     (+) psychiatric history (severe requiring multiple ECT) depression      Infectious Disease:         Malignancy:         Other:                     Physical Exam  Normal systems: cardiovascular, pulmonary and dental    Airway   Mallampati: II  TM distance: >3 FB  Neck ROM: full    Dental     Cardiovascular   Rhythm and rate: regular and normal  (-) no murmur    Pulmonary    breath sounds clear to auscultation                    Anesthesia Plan      History & Physical Review      ASA Status:  2 .    NPO Status:  > 8 hours    Plan for General with Intravenous induction.   PONV prophylaxis:  Ondansetron (or other 5HT-3)  Toradol, ondansetron.       Postoperative Care  Postoperative pain management:  Multi-modal analgesia.      Consents  Anesthetic plan, risks, benefits and alternatives discussed with:  Patient..                          .

## 2017-10-27 NOTE — PROCEDURES
[facility] ECT Procedure Note     Nuha Lees [id number]   32 year old 1985     Patient Status: Outpatient    Allergies   Allergen Reactions     Codeine Sulfate      Patient reported tolerated Ultram in the past     Sulfa Drugs      Trintellix [Vortioxetine]        Weight:  0 lbs 0 oz    Patient Preparations: Other (comment)         Diagnosis:   Major depression       Indications for ECT:   Medications ineffective and History of good ECT response in one or more previous episodes of illness       Pause for the Cause:     Right patient Yes   Right procedure/laterality settings: Yes   Right diagnosis Yes          Intra-Procedure Documentation:     Date:  10/27/2017  Time:  6:46 AM    ECT #    Treatment number this series: 1 (M)   Total treatment number: 10   Type of ECT:  Bilateral, standard    ECT Medications administered: Brevital: 100mg  Succinyl Choline: 100mg   toradol         Clinical Narrative:     ECT was administered by Thymatron machine.  Pt has been medically cleared for procedure, consent signed. Side effects, Risks and benefits reviewed.    ECT Strip Summary:   Energy Level: 100 percent  Motor Seizure Duration: 30 seconds  EEG Seizure Duration: 30 seconds    Complications: Yes Was intubated with glidescope in the past due to aspiration pneumonia in the past    Plan: next ect 11/10/17  Outpatient Psychiatrist: Dr Lemus

## 2017-11-10 ENCOUNTER — ANESTHESIA (OUTPATIENT)
Dept: SURGERY | Facility: CLINIC | Age: 32
End: 2017-11-10

## 2017-11-10 ENCOUNTER — ANESTHESIA EVENT (OUTPATIENT)
Dept: SURGERY | Facility: CLINIC | Age: 32
End: 2017-11-10

## 2017-11-10 ENCOUNTER — HOSPITAL ENCOUNTER (OUTPATIENT)
Dept: SURGERY | Facility: CLINIC | Age: 32
Discharge: HOME | End: 2017-11-10
Attending: PSYCHIATRY & NEUROLOGY | Admitting: PSYCHIATRY & NEUROLOGY
Payer: COMMERCIAL

## 2017-11-10 VITALS
SYSTOLIC BLOOD PRESSURE: 99 MMHG | DIASTOLIC BLOOD PRESSURE: 63 MMHG | OXYGEN SATURATION: 92 % | HEART RATE: 82 BPM | RESPIRATION RATE: 23 BRPM

## 2017-11-10 DIAGNOSIS — F33.2 SEVERE RECURRENT MAJOR DEPRESSION WITHOUT PSYCHOTIC FEATURES (H): ICD-10-CM

## 2017-11-10 PROCEDURE — 25000128 H RX IP 250 OP 636: Performed by: PSYCHIATRY & NEUROLOGY

## 2017-11-10 PROCEDURE — 25000125 ZZHC RX 250: Performed by: NURSE ANESTHETIST, CERTIFIED REGISTERED

## 2017-11-10 PROCEDURE — 40000010 ZZH STATISTIC ANES STAT CODE-CRNA PER MINUTE

## 2017-11-10 PROCEDURE — 37000008 ZZH ANESTHESIA TECHNICAL FEE, 1ST 30 MIN

## 2017-11-10 PROCEDURE — 25000128 H RX IP 250 OP 636: Performed by: NURSE ANESTHETIST, CERTIFIED REGISTERED

## 2017-11-10 PROCEDURE — 25000125 ZZHC RX 250: Performed by: ANESTHESIOLOGY

## 2017-11-10 PROCEDURE — 90870 ELECTROCONVULSIVE THERAPY: CPT

## 2017-11-10 PROCEDURE — 25000128 H RX IP 250 OP 636: Performed by: ANESTHESIOLOGY

## 2017-11-10 RX ORDER — ALBUTEROL SULFATE 0.83 MG/ML
2.5 SOLUTION RESPIRATORY (INHALATION) ONCE
Status: COMPLETED | OUTPATIENT
Start: 2017-11-10 | End: 2017-11-10

## 2017-11-10 RX ORDER — SODIUM CHLORIDE, SODIUM LACTATE, POTASSIUM CHLORIDE, CALCIUM CHLORIDE 600; 310; 30; 20 MG/100ML; MG/100ML; MG/100ML; MG/100ML
500 INJECTION, SOLUTION INTRAVENOUS CONTINUOUS
Status: DISCONTINUED | OUTPATIENT
Start: 2017-11-10 | End: 2017-11-11 | Stop reason: HOSPADM

## 2017-11-10 RX ORDER — KETOROLAC TROMETHAMINE 30 MG/ML
30 INJECTION, SOLUTION INTRAMUSCULAR; INTRAVENOUS
Status: CANCELLED
Start: 2017-11-10

## 2017-11-10 RX ORDER — SODIUM CHLORIDE, SODIUM LACTATE, POTASSIUM CHLORIDE, CALCIUM CHLORIDE 600; 310; 30; 20 MG/100ML; MG/100ML; MG/100ML; MG/100ML
INJECTION, SOLUTION INTRAVENOUS CONTINUOUS
Status: DISCONTINUED | OUTPATIENT
Start: 2017-11-10 | End: 2017-11-11 | Stop reason: HOSPADM

## 2017-11-10 RX ORDER — HYDROMORPHONE HYDROCHLORIDE 1 MG/ML
.3-.5 INJECTION, SOLUTION INTRAMUSCULAR; INTRAVENOUS; SUBCUTANEOUS EVERY 10 MIN PRN
Status: DISCONTINUED | OUTPATIENT
Start: 2017-11-10 | End: 2017-11-11 | Stop reason: HOSPADM

## 2017-11-10 RX ORDER — FENTANYL CITRATE 50 UG/ML
25-50 INJECTION, SOLUTION INTRAMUSCULAR; INTRAVENOUS
Status: DISCONTINUED | OUTPATIENT
Start: 2017-11-10 | End: 2017-11-11 | Stop reason: HOSPADM

## 2017-11-10 RX ORDER — ONDANSETRON 4 MG/1
4 TABLET, ORALLY DISINTEGRATING ORAL EVERY 30 MIN PRN
Status: DISCONTINUED | OUTPATIENT
Start: 2017-11-10 | End: 2017-11-11 | Stop reason: HOSPADM

## 2017-11-10 RX ORDER — LORAZEPAM 2 MG/ML
1 INJECTION INTRAMUSCULAR ONCE
Status: COMPLETED | OUTPATIENT
Start: 2017-11-10 | End: 2017-11-10

## 2017-11-10 RX ORDER — KETOROLAC TROMETHAMINE 30 MG/ML
30 INJECTION, SOLUTION INTRAMUSCULAR; INTRAVENOUS
Status: DISCONTINUED | OUTPATIENT
Start: 2017-11-10 | End: 2017-11-11 | Stop reason: HOSPADM

## 2017-11-10 RX ORDER — NALOXONE HYDROCHLORIDE 0.4 MG/ML
.1-.4 INJECTION, SOLUTION INTRAMUSCULAR; INTRAVENOUS; SUBCUTANEOUS
Status: DISCONTINUED | OUTPATIENT
Start: 2017-11-10 | End: 2017-11-11 | Stop reason: HOSPADM

## 2017-11-10 RX ORDER — MEPERIDINE HYDROCHLORIDE 25 MG/ML
12.5 INJECTION INTRAMUSCULAR; INTRAVENOUS; SUBCUTANEOUS
Status: DISCONTINUED | OUTPATIENT
Start: 2017-11-10 | End: 2017-11-11 | Stop reason: HOSPADM

## 2017-11-10 RX ORDER — HYDRALAZINE HYDROCHLORIDE 20 MG/ML
2.5-5 INJECTION INTRAMUSCULAR; INTRAVENOUS EVERY 10 MIN PRN
Status: DISCONTINUED | OUTPATIENT
Start: 2017-11-10 | End: 2017-11-11 | Stop reason: HOSPADM

## 2017-11-10 RX ORDER — ONDANSETRON 2 MG/ML
4 INJECTION INTRAMUSCULAR; INTRAVENOUS EVERY 30 MIN PRN
Status: DISCONTINUED | OUTPATIENT
Start: 2017-11-10 | End: 2017-11-11 | Stop reason: HOSPADM

## 2017-11-10 RX ORDER — ALBUTEROL SULFATE 0.83 MG/ML
2.5 SOLUTION RESPIRATORY (INHALATION) EVERY 4 HOURS PRN
Status: DISCONTINUED | OUTPATIENT
Start: 2017-11-10 | End: 2017-11-11 | Stop reason: HOSPADM

## 2017-11-10 RX ORDER — FENTANYL CITRATE 50 UG/ML
25-50 INJECTION, SOLUTION INTRAMUSCULAR; INTRAVENOUS
Status: CANCELLED | OUTPATIENT
Start: 2017-11-10

## 2017-11-10 RX ORDER — LABETALOL HYDROCHLORIDE 5 MG/ML
10 INJECTION, SOLUTION INTRAVENOUS
Status: DISCONTINUED | OUTPATIENT
Start: 2017-11-10 | End: 2017-11-11 | Stop reason: HOSPADM

## 2017-11-10 RX ORDER — LIDOCAINE 50 MG/G
1 PATCH TOPICAL
Status: CANCELLED | OUTPATIENT
Start: 2017-11-11

## 2017-11-10 RX ORDER — SODIUM CHLORIDE, SODIUM LACTATE, POTASSIUM CHLORIDE, CALCIUM CHLORIDE 600; 310; 30; 20 MG/100ML; MG/100ML; MG/100ML; MG/100ML
INJECTION, SOLUTION INTRAVENOUS CONTINUOUS PRN
Status: DISCONTINUED | OUTPATIENT
Start: 2017-11-10 | End: 2017-11-10

## 2017-11-10 RX ADMIN — SODIUM CHLORIDE, POTASSIUM CHLORIDE, SODIUM LACTATE AND CALCIUM CHLORIDE 500 ML: 600; 310; 30; 20 INJECTION, SOLUTION INTRAVENOUS at 06:35

## 2017-11-10 RX ADMIN — SUCCINYLCHOLINE CHLORIDE 100 MG: 20 INJECTION, SOLUTION INTRAMUSCULAR; INTRAVENOUS at 07:24

## 2017-11-10 RX ADMIN — SODIUM CHLORIDE, POTASSIUM CHLORIDE, SODIUM LACTATE AND CALCIUM CHLORIDE: 600; 310; 30; 20 INJECTION, SOLUTION INTRAVENOUS at 07:22

## 2017-11-10 RX ADMIN — KETOROLAC TROMETHAMINE 30 MG: 30 INJECTION, SOLUTION INTRAMUSCULAR at 06:40

## 2017-11-10 RX ADMIN — METHOHEXITAL SODIUM 100 MG: 500 INJECTION, POWDER, LYOPHILIZED, FOR SOLUTION INTRAMUSCULAR; INTRAVENOUS; RECTAL at 07:24

## 2017-11-10 RX ADMIN — ALBUTEROL SULFATE 2.5 MG: 2.5 SOLUTION RESPIRATORY (INHALATION) at 06:46

## 2017-11-10 RX ADMIN — LIDOCAINE HYDROCHLORIDE 1 ML: 10 INJECTION, SOLUTION EPIDURAL; INFILTRATION; INTRACAUDAL; PERINEURAL at 06:35

## 2017-11-10 RX ADMIN — LORAZEPAM 1 MG: 2 INJECTION INTRAMUSCULAR; INTRAVENOUS at 07:38

## 2017-11-10 NOTE — ANESTHESIA POSTPROCEDURE EVALUATION
Patient: Nuha Lees    * No procedures listed *    Diagnosis:* No pre-op diagnosis entered *  Diagnosis Additional Information: No value filed.    Anesthesia Type:  General    Note:  Anesthesia Post Evaluation    Patient location during evaluation: PACU  Patient participation: Able to fully participate in evaluation  Level of consciousness: awake  Pain management: adequate  Airway patency: patent  Cardiovascular status: acceptable  Respiratory status: acceptable  Hydration status: acceptable  PONV: none     Anesthetic complications: None          Last vitals:  Vitals:    11/10/17 0745 11/10/17 0750 11/10/17 0755   BP: 107/64 95/72 98/57   Pulse:      Resp: 18 20 18   SpO2: 95% 95% 95%         Electronically Signed By: Julia Lamb MD, MD  November 10, 2017  8:02 AM

## 2017-11-10 NOTE — ANESTHESIA CARE TRANSFER NOTE
Patient: Nuha Lees    * No procedures listed *    Diagnosis: * No pre-op diagnosis entered *  Diagnosis Additional Information: No value filed.    Anesthesia Type:   General     Note:  Airway :Nasal Cannula  Patient transferred to:PACU  Comments: Pt to PACU. VSS. Report complete to RN.Handoff Report: Identifed the Patient, Identified the Reponsible Provider, Reviewed the pertinent medical history, Discussed the surgical course, Reviewed Intra-OP anesthesia mangement and issues during anesthesia, Set expectations for post-procedure period and Allowed opportunity for questions and acknowledgement of understanding      Vitals: (Last set prior to Anesthesia Care Transfer)    CRNA VITALS  11/10/2017 0702 - 11/10/2017 0732      11/10/2017             Pulse: 72    SpO2: 99 %    Resp Rate (observed): 23    Resp Rate (set): 10                Electronically Signed By: Michaelle Alegre CRNA, APRN CRNA  November 10, 2017  7:32 AM

## 2017-11-10 NOTE — PROCEDURES
[facility] ECT Procedure Note     Nuha Lees [id number]   32 year old 1985     Patient Status: Outpatient    Allergies   Allergen Reactions     Codeine Sulfate      Patient reported tolerated Ultram in the past     Sulfa Drugs      Trintellix [Vortioxetine]        Weight:  0 lbs 0 oz    Patient Preparations: Other (comment)         Diagnosis:   Major depression       Indications for ECT:   Medications ineffective and History of good ECT response in one or more previous episodes of illness       Pause for the Cause:     Right patient Yes   Right procedure/laterality settings: Yes   Right diagnosis Yes          Intra-Procedure Documentation:     Date:  11/10/2017  Time:  6:46 AM    ECT #    Treatment number this series: 1 (M)   Total treatment number: 11   Type of ECT:  Bilateral, standard    ECT Medications administered: Brevital: 100mg  Succinyl Choline: 100mg   toradol         Clinical Narrative:     ECT was administered by Thymatron machine.  Pt has been medically cleared for procedure, consent signed. Side effects, Risks and benefits reviewed.    ECT Strip Summary:   Energy Level: 100 percent  Motor Seizure Duration: 30 seconds  EEG Seizure Duration: 30 seconds    Complications: Yes Was intubated with glidescope in the past due to aspiration pneumonia in the past    Plan: next ect 11/20/17  Outpatient Psychiatrist: Dr Lemus

## 2017-11-10 NOTE — ANESTHESIA PREPROCEDURE EVALUATION
Procedure: * No procedures listed *  Preop diagnosis: * No pre-op diagnosis entered *    Allergies   Allergen Reactions     Codeine Sulfate      Patient reported tolerated Ultram in the past     Sulfa Drugs      Trintellix [Vortioxetine]      Past Medical History:   Diagnosis Date     Asthma      Past Surgical History:   Procedure Laterality Date     KNEE SURGERY Right     Roller derby injury      REPAIR PTOSIS  10/5/2011    Procedure:REPAIR PTOSIS; Left Upper Lid Ptosis Repair; Surgeon:MONICA ANDREWS; Location: [facility]     TUBAL LIGATION  2016     Prior to Admission medications    Medication Sig Start Date End Date Taking? Authorizing Provider   vilazodone (VIIBRYD) 20 MG TABS tablet Take 1 tablet (20 mg) by mouth daily 9/23/17 10/23/17  Danilo Leon MD   Magnesium Hydroxide (MILK OF MAGNESIA PO) Take 30 mLs by mouth nightly as needed 9/18/17   Reported, Patient   IBUPROFEN PO Take 600 mg by mouth every 6 hours as needed for moderate pain    Reported, Patient   hydrOXYzine (ATARAX) 25 MG tablet Take 1-2 tablets (25-50 mg) by mouth every 4 hours as needed for anxiety 5/21/17   Camilo Ross MD   albuterol (PROAIR HFA/PROVENTIL HFA/VENTOLIN HFA) 108 (90 BASE) MCG/ACT Inhaler Inhale 2 puffs into the lungs every 4 hours as needed for shortness of breath / dyspnea or wheezing 5/21/17   Camilo Ross MD   fluticasone-vilanterol (BREO ELLIPTA) 200-25 MCG/INH oral inhaler Inhale 1 puff into the lungs daily 5/21/17   Camilo Ross MD   ipratropium - albuterol 0.5 mg/2.5 mg/3 mL (DUONEB) 0.5-2.5 (3) MG/3ML neb solution Take 1 vial (3 mLs) by nebulization every 4 hours as needed for shortness of breath / dyspnea or wheezing 5/21/17   Camilo Ross MD   traZODone (DESYREL) 50 MG tablet Take 1 tablet (50 mg) by mouth nightly as needed for sleep  Patient taking differently: Take  mg by mouth nightly as needed for sleep  5/21/17   Camilo Ross MD   senna-docusate  (SENOKOT-S;PERICOLACE) 8.6-50 MG per tablet Take 2 tablets by mouth 2 times daily as needed (constipation ) 5/21/17   Camilo Ross MD   pantoprazole (PROTONIX) 40 MG EC tablet Take 1 tablet (40 mg) by mouth every morning (before breakfast) 5/21/17   Camilo Ross MD   nicotine (NICODERM CQ) 14 MG/24HR 24 hr patch Place 1 patch onto the skin daily 5/21/17   Camilo Ross MD   fluticasone (VERAMYST) 27.5 MCG/SPRAY spray Spray 2 sprays into both nostrils daily    Reported, Patient   montelukast (SINGULAIR) 10 MG tablet Take 10 mg by mouth At Bedtime    Reported, Patient   CLONAZEPAM PO Take 0.5 mg by mouth 3 times daily    Reported, Patient   VITAMIN D, CHOLECALCIFEROL, PO Take 5,000 Units by mouth daily    Reported, Patient     Current Outpatient Prescriptions Ordered in Epic   Medication     vilazodone (VIIBRYD) 20 MG TABS tablet     Magnesium Hydroxide (MILK OF MAGNESIA PO)     IBUPROFEN PO     hydrOXYzine (ATARAX) 25 MG tablet     albuterol (PROAIR HFA/PROVENTIL HFA/VENTOLIN HFA) 108 (90 BASE) MCG/ACT Inhaler     fluticasone-vilanterol (BREO ELLIPTA) 200-25 MCG/INH oral inhaler     ipratropium - albuterol 0.5 mg/2.5 mg/3 mL (DUONEB) 0.5-2.5 (3) MG/3ML neb solution     traZODone (DESYREL) 50 MG tablet     senna-docusate (SENOKOT-S;PERICOLACE) 8.6-50 MG per tablet     pantoprazole (PROTONIX) 40 MG EC tablet     nicotine (NICODERM CQ) 14 MG/24HR 24 hr patch     fluticasone (VERAMYST) 27.5 MCG/SPRAY spray     montelukast (SINGULAIR) 10 MG tablet     CLONAZEPAM PO     VITAMIN D, CHOLECALCIFEROL, PO     Current Facility-Administered Medications Ordered in Epic   Medication Dose Route Frequency Last Rate Last Dose     ketorolac (TORADOL) injection 30 mg  30 mg Intravenous Q Mon Wed Fri AM   30 mg at 11/10/17 0640     lidocaine 1 % 1 mL  1 mL Other Q1H PRN   1 mL at 11/10/17 0635     sodium chloride (PF) 0.9% PF flush 3 mL  3 mL Intracatheter Q1H PRN         lactated ringers infusion  500 mL  Intravenous Continuous 25 mL/hr at 11/10/17 0635 500 mL at 11/10/17 0635     Wt Readings from Last 1 Encounters:   09/19/17 79.5 kg (175 lb 3.2 oz)     Temp Readings from Last 1 Encounters:   10/27/17 37  C (98.6  F) (Temporal)     BP Readings from Last 6 Encounters:   11/10/17 107/73   10/27/17 104/67   10/11/17 101/51   09/22/17 102/62   09/16/17 120/51   07/28/17 106/74     Pulse Readings from Last 4 Encounters:   11/10/17 82   10/11/17 82   09/22/17 64   09/16/17 84     Resp Readings from Last 1 Encounters:   11/10/17 13     SpO2 Readings from Last 1 Encounters:   11/10/17 99%     Recent Labs   Lab Test  09/17/17   1134  07/17/17   0609   NA  139  142   POTASSIUM  3.8  3.6   CHLORIDE  106  107   CO2  23  24   ANIONGAP  10  11   GLC  80  97   BUN  9  13   CR  0.76  1.01   NICHOLAS  8.8  9.1     Recent Labs   Lab Test  09/17/17   0722  07/17/17   0609   WBC  8.1  9.2   HGB  13.3  15.5   PLT  227  229         Anesthesia Evaluation     . Pt has had prior anesthetic.     No history of anesthetic complications          ROS/MED HX    ENT/Pulmonary: Comment: Previous history of aspiration pneumonia with ECT.    (+)Intermittent asthma , . .   (-) sleep apnea   Neurologic:       Cardiovascular:         METS/Exercise Tolerance:     Hematologic:         Musculoskeletal:         GI/Hepatic:        (-) GERD   Renal/Genitourinary:         Endo:         Psychiatric:     (+) psychiatric history (severe requiring multiple ECT) depression      Infectious Disease:         Malignancy:         Other:                     Physical Exam  Normal systems: cardiovascular, pulmonary and dental    Airway   Mallampati: II  TM distance: >3 FB  Neck ROM: full    Dental     Cardiovascular   Rhythm and rate: regular and normal  (-) no murmur    Pulmonary    breath sounds clear to auscultation                        Anesthesia Plan      History & Physical Review      ASA Status:  2 .    NPO Status:  > 8 hours    Plan for General with Intravenous  induction.   PONV prophylaxis:  Ondansetron (or other 5HT-3)  Toradol, ondansetron.       Postoperative Care  Postoperative pain management:  Multi-modal analgesia.      Consents  Anesthetic plan, risks, benefits and alternatives discussed with:  Patient..                          .

## 2023-09-26 NOTE — PROGRESS NOTES
[facility]   Dr. Lemus's Psychiatric Progress Note  2017      Patient:  Nuha Lees   Medical Record Number:  [id number]  :  1985          Interim History:   The patient's care was discussed with the treatment team and chart notes were reviewed.  Pt arrived on the unit yesterday afternoon.  Mood remains depressed.  She's still suicidal.  She's hoping she will be a candidate for ECT next week if medically cleared.  Her asthma is an issue.      Psychiatric ROS:  Mood: depressed and anxious  Sleep:poor with middle insomnia  Appetite:decreased  Eating:  less  Energy Level:LOW  Concentration/Memory Problems:  YES  Suicidal Thoughts:Yes ;  Still feels suicidal but feels safe on the unit  Homicidal Thoughts:No  Psychotic Symptoms: No  Medication Side Effects:No  Medication Compliance:Yes   Physical Complaints:positive for asthma         Medications:     Current Facility-Administered Medications   Medication     albuterol (PROAIR HFA/PROVENTIL HFA/VENTOLIN HFA) Inhaler 2 puff     clonazePAM (klonoPIN) tablet 0.5 mg     fluticasone-vilanterol (BREO ELLIPTA) 200-25 MCG/INH oral inhaler 1 puff     hydrOXYzine (ATARAX) tablet 25-50 mg     ipratropium - albuterol 0.5 mg/2.5 mg/3 mL (DUONEB) neb solution 3 mL     montelukast (SINGULAIR) tablet 10 mg     nicotine (NICODERM CQ) 14 MG/24HR 24 hr patch 1 patch     pantoprazole (PROTONIX) EC tablet 40 mg     traZODone (DESYREL) tablet 50 mg     venlafaxine (EFFEXOR-ER) 24 hr tablet 150 mg     cholecalciferol (vitamin D3) capsule CAPS 5,000 Units     acetaminophen (TYLENOL) tablet 650 mg     alum & mag hydroxide-simethicone (MYLANTA ES/MAALOX  ES) suspension 30 mL     magnesium hydroxide (MILK OF MAGNESIA) suspension 30 mL     nicotine patch REMOVAL     nicotine Patch in Place     [START ON 2017] venlafaxine (EFFEXOR-ER) 24 hr tablet 75 mg     [START ON 2017] venlafaxine (EFFEXOR-ER) 24 hr tablet 37.5 mg      levomilnacipran (FETZIMA ER) 24 hr capsule 20 mg     [START ON 7/16/2017] levomilnacipran (FETZIMA ER) 24 hr capsule 40 mg     [START ON 7/23/2017] levomilnacipran (FETZIMA ER) 24 hr capsule 80 mg     fluticasone (FLONASE) 50 MCG/ACT spray 2 spray             Allergies:     Allergies   Allergen Reactions     Codeine Sulfate      Sulfa Drugs      Trintellix [Vortioxetine]             Psychiatric Examination:   Temp 97.8  F (36.6  C)  Weight is 0 lbs 0 oz  There is no height or weight on file to calculate BMI.    Appearance:  poorly groomed  Attitude:  cooperative  Eye Contact:  good  Mood:  depressed  Affect:  mood congruent  Speech:  clear, coherent  Psychomotor Behavior:  no evidence of tardive dyskinesia, dystonia, or tics  Throught Process:  logical  Associations:  no loose associations  Thought Content:  no evidence of psychotic thought and active suicidal ideation present  Insight:  fair  Judgement:  intact  Oriented to:  time, person, and place  Attention Span and Concentration:  intact  Recent and Remote Memory:  intact  Gait:Normal    Risk/Potential for Dangerousness:  Multiple Active Diagnoses:HIGH  Self Care:HIGH  Suicide:HIGH  Assault:LOW  Self Injurious Behaviors:LOW  Inappropriate Sexual Behavior:LOW         Labs:        Recent Results (from the past 1008 hour(s))   EKG 12-lead, complete    Collection Time: 07/13/17  4:51 PM   Result Value Ref Range    Interpretation ECG Click View Image link to view waveform and result    Hemoglobin    Collection Time: 07/14/17  7:51 AM   Result Value Ref Range    Hemoglobin 14.1 11.7 - 15.7 g/dL         Impression:   This is a 31 year old female admitted with worsening depression and suicidal ideation and inability to care for herself.  She remains depressed and suicidal.           DIagnoses:      Axis I.  Major depressive disorder, recurrent.  Panic disorder with agoraphobia.  Generalized anxiety disorder.       Axis II. Deferred.       Axis III.  Asthma.              Plan:     Continue tapering off Effexor XR.  She started out at 300 milligrams daily and is now down to 150 milligrams daily, will continue to taper off that.  Begin and titrate Fetzima.  She completed cytochrome P450 testing last month.  Spoke with Dr. Silvestre from Anesthesia.  They will meet with her and decide if she is an anesthetic candidate for ECT.  Will have internal medicine evaluate and decide if she is a candidate for ECT.  Expect stabilization and discharge back home with her family.   Patient needs to be in the hospital due to here severe depression and suicidality.      Heber Lemus MD     accepted

## 2023-11-19 NOTE — PROGRESS NOTES
Pt was up and visible out in milieu. On approach polite and pleasant, behavior was calm and controlled. Pt social on approach and kept to self most pf shift, bright affect, and spent time out in milieu. Pt denies SI, SIB, HI and cooperates.    patient

## (undated) RX ORDER — IPRATROPIUM BROMIDE AND ALBUTEROL SULFATE 2.5; .5 MG/3ML; MG/3ML
SOLUTION RESPIRATORY (INHALATION)
Status: DISPENSED
Start: 2017-07-28

## (undated) RX ORDER — ALBUTEROL SULFATE 90 UG/1
AEROSOL, METERED RESPIRATORY (INHALATION)
Status: DISPENSED
Start: 2017-05-19

## (undated) RX ORDER — CITRIC ACID/SODIUM CITRATE 334-500MG
SOLUTION, ORAL ORAL
Status: DISPENSED
Start: 2017-07-24

## (undated) RX ORDER — KETOROLAC TROMETHAMINE 30 MG/ML
INJECTION, SOLUTION INTRAMUSCULAR; INTRAVENOUS
Status: DISPENSED
Start: 2017-07-26

## (undated) RX ORDER — LIDOCAINE HYDROCHLORIDE 20 MG/ML
INJECTION, SOLUTION EPIDURAL; INFILTRATION; INTRACAUDAL; PERINEURAL
Status: DISPENSED
Start: 2017-07-19

## (undated) RX ORDER — ONDANSETRON 2 MG/ML
INJECTION INTRAMUSCULAR; INTRAVENOUS
Status: DISPENSED
Start: 2017-07-26

## (undated) RX ORDER — ONDANSETRON 2 MG/ML
INJECTION INTRAMUSCULAR; INTRAVENOUS
Status: DISPENSED
Start: 2017-07-21

## (undated) RX ORDER — IPRATROPIUM BROMIDE AND ALBUTEROL SULFATE 2.5; .5 MG/3ML; MG/3ML
SOLUTION RESPIRATORY (INHALATION)
Status: DISPENSED
Start: 2017-07-24

## (undated) RX ORDER — KETOROLAC TROMETHAMINE 30 MG/ML
INJECTION, SOLUTION INTRAMUSCULAR; INTRAVENOUS
Status: DISPENSED
Start: 2017-07-21

## (undated) RX ORDER — IPRATROPIUM BROMIDE AND ALBUTEROL SULFATE 2.5; .5 MG/3ML; MG/3ML
SOLUTION RESPIRATORY (INHALATION)
Status: DISPENSED
Start: 2017-07-26

## (undated) RX ORDER — DEXAMETHASONE SODIUM PHOSPHATE 4 MG/ML
INJECTION, SOLUTION INTRA-ARTICULAR; INTRALESIONAL; INTRAMUSCULAR; INTRAVENOUS; SOFT TISSUE
Status: DISPENSED
Start: 2017-07-26

## (undated) RX ORDER — KETOROLAC TROMETHAMINE 30 MG/ML
INJECTION, SOLUTION INTRAMUSCULAR; INTRAVENOUS
Status: DISPENSED
Start: 2017-07-19

## (undated) RX ORDER — ETOMIDATE 2 MG/ML
INJECTION INTRAVENOUS
Status: DISPENSED
Start: 2017-07-26

## (undated) RX ORDER — ALBUTEROL SULFATE 0.83 MG/ML
SOLUTION RESPIRATORY (INHALATION)
Status: DISPENSED
Start: 2017-05-19

## (undated) RX ORDER — KETOROLAC TROMETHAMINE 30 MG/ML
INJECTION, SOLUTION INTRAMUSCULAR; INTRAVENOUS
Status: DISPENSED
Start: 2017-10-11

## (undated) RX ORDER — ALBUTEROL SULFATE 0.83 MG/ML
SOLUTION RESPIRATORY (INHALATION)
Status: DISPENSED
Start: 2017-11-10

## (undated) RX ORDER — ONDANSETRON 2 MG/ML
INJECTION INTRAMUSCULAR; INTRAVENOUS
Status: DISPENSED
Start: 2017-07-19

## (undated) RX ORDER — CITRIC ACID/SODIUM CITRATE 334-500MG
SOLUTION, ORAL ORAL
Status: DISPENSED
Start: 2017-07-26

## (undated) RX ORDER — ALBUTEROL SULFATE 0.83 MG/ML
SOLUTION RESPIRATORY (INHALATION)
Status: DISPENSED
Start: 2017-07-21

## (undated) RX ORDER — KETOROLAC TROMETHAMINE 30 MG/ML
INJECTION, SOLUTION INTRAMUSCULAR; INTRAVENOUS
Status: DISPENSED
Start: 2017-11-10

## (undated) RX ORDER — ALBUTEROL SULFATE 0.83 MG/ML
SOLUTION RESPIRATORY (INHALATION)
Status: DISPENSED
Start: 2017-10-11

## (undated) RX ORDER — IPRATROPIUM BROMIDE AND ALBUTEROL SULFATE 2.5; .5 MG/3ML; MG/3ML
SOLUTION RESPIRATORY (INHALATION)
Status: DISPENSED
Start: 2017-09-22

## (undated) RX ORDER — ONDANSETRON 2 MG/ML
INJECTION INTRAMUSCULAR; INTRAVENOUS
Status: DISPENSED
Start: 2017-07-28

## (undated) RX ORDER — PROPOFOL 10 MG/ML
INJECTION, EMULSION INTRAVENOUS
Status: DISPENSED
Start: 2017-07-26

## (undated) RX ORDER — ONDANSETRON 2 MG/ML
INJECTION INTRAMUSCULAR; INTRAVENOUS
Status: DISPENSED
Start: 2017-05-19

## (undated) RX ORDER — ETOMIDATE 2 MG/ML
INJECTION INTRAVENOUS
Status: DISPENSED
Start: 2017-07-17

## (undated) RX ORDER — KETOROLAC TROMETHAMINE 30 MG/ML
INJECTION, SOLUTION INTRAMUSCULAR; INTRAVENOUS
Status: DISPENSED
Start: 2017-10-27

## (undated) RX ORDER — CITRIC ACID/SODIUM CITRATE 334-500MG
SOLUTION, ORAL ORAL
Status: DISPENSED
Start: 2017-07-28

## (undated) RX ORDER — ETOMIDATE 2 MG/ML
INJECTION INTRAVENOUS
Status: DISPENSED
Start: 2017-07-24

## (undated) RX ORDER — LIDOCAINE HYDROCHLORIDE 20 MG/ML
INJECTION, SOLUTION EPIDURAL; INFILTRATION; INTRACAUDAL; PERINEURAL
Status: DISPENSED
Start: 2017-07-24

## (undated) RX ORDER — METHOHEXITAL IN WATER/PF 100MG/10ML
SYRINGE (ML) INTRAVENOUS
Status: DISPENSED
Start: 2017-05-19

## (undated) RX ORDER — ACETAMINOPHEN 325 MG/1
TABLET ORAL
Status: DISPENSED
Start: 2017-07-17

## (undated) RX ORDER — LIDOCAINE HYDROCHLORIDE 20 MG/ML
INJECTION, SOLUTION EPIDURAL; INFILTRATION; INTRACAUDAL; PERINEURAL
Status: DISPENSED
Start: 2017-07-26

## (undated) RX ORDER — IPRATROPIUM BROMIDE AND ALBUTEROL SULFATE 2.5; .5 MG/3ML; MG/3ML
SOLUTION RESPIRATORY (INHALATION)
Status: DISPENSED
Start: 2017-09-20

## (undated) RX ORDER — CITRIC ACID/SODIUM CITRATE 334-500MG
SOLUTION, ORAL ORAL
Status: DISPENSED
Start: 2017-07-19

## (undated) RX ORDER — ONDANSETRON 2 MG/ML
INJECTION INTRAMUSCULAR; INTRAVENOUS
Status: DISPENSED
Start: 2017-07-17

## (undated) RX ORDER — ONDANSETRON 2 MG/ML
INJECTION INTRAMUSCULAR; INTRAVENOUS
Status: DISPENSED
Start: 2017-10-27

## (undated) RX ORDER — KETOROLAC TROMETHAMINE 30 MG/ML
INJECTION, SOLUTION INTRAMUSCULAR; INTRAVENOUS
Status: DISPENSED
Start: 2017-07-28

## (undated) RX ORDER — SODIUM CHLORIDE, SODIUM LACTATE, POTASSIUM CHLORIDE, CALCIUM CHLORIDE 600; 310; 30; 20 MG/100ML; MG/100ML; MG/100ML; MG/100ML
INJECTION, SOLUTION INTRAVENOUS
Status: DISPENSED
Start: 2017-05-19

## (undated) RX ORDER — LORAZEPAM 2 MG/ML
INJECTION INTRAMUSCULAR
Status: DISPENSED
Start: 2017-11-10

## (undated) RX ORDER — ONDANSETRON 2 MG/ML
INJECTION INTRAMUSCULAR; INTRAVENOUS
Status: DISPENSED
Start: 2017-09-22

## (undated) RX ORDER — FENTANYL CITRATE 50 UG/ML
INJECTION, SOLUTION INTRAMUSCULAR; INTRAVENOUS
Status: DISPENSED
Start: 2017-07-17

## (undated) RX ORDER — KETOROLAC TROMETHAMINE 30 MG/ML
INJECTION, SOLUTION INTRAMUSCULAR; INTRAVENOUS
Status: DISPENSED
Start: 2017-07-24

## (undated) RX ORDER — IPRATROPIUM BROMIDE AND ALBUTEROL SULFATE 2.5; .5 MG/3ML; MG/3ML
SOLUTION RESPIRATORY (INHALATION)
Status: DISPENSED
Start: 2017-07-21

## (undated) RX ORDER — GLYCOPYRROLATE 0.2 MG/ML
INJECTION, SOLUTION INTRAMUSCULAR; INTRAVENOUS
Status: DISPENSED
Start: 2017-07-26

## (undated) RX ORDER — ONDANSETRON 2 MG/ML
INJECTION INTRAMUSCULAR; INTRAVENOUS
Status: DISPENSED
Start: 2017-07-24

## (undated) RX ORDER — IPRATROPIUM BROMIDE AND ALBUTEROL SULFATE 2.5; .5 MG/3ML; MG/3ML
SOLUTION RESPIRATORY (INHALATION)
Status: DISPENSED
Start: 2017-07-19